# Patient Record
Sex: MALE | Race: WHITE | Employment: OTHER | URBAN - METROPOLITAN AREA
[De-identification: names, ages, dates, MRNs, and addresses within clinical notes are randomized per-mention and may not be internally consistent; named-entity substitution may affect disease eponyms.]

---

## 2017-04-14 ENCOUNTER — OFFICE VISIT (OUTPATIENT)
Dept: FAMILY MEDICINE | Facility: CLINIC | Age: 57
End: 2017-04-14
Payer: COMMERCIAL

## 2017-04-14 VITALS
DIASTOLIC BLOOD PRESSURE: 90 MMHG | SYSTOLIC BLOOD PRESSURE: 158 MMHG | OXYGEN SATURATION: 97 % | TEMPERATURE: 97.3 F | HEART RATE: 110 BPM

## 2017-04-14 DIAGNOSIS — F33.41 RECURRENT MAJOR DEPRESSIVE DISORDER, IN PARTIAL REMISSION (H): ICD-10-CM

## 2017-04-14 DIAGNOSIS — N52.39 OTHER POST-PROCEDURAL ERECTILE DYSFUNCTION: ICD-10-CM

## 2017-04-14 DIAGNOSIS — R03.0 ELEVATED BLOOD PRESSURE READING WITHOUT DIAGNOSIS OF HYPERTENSION: Primary | ICD-10-CM

## 2017-04-14 PROCEDURE — 99214 OFFICE O/P EST MOD 30 MIN: CPT | Performed by: FAMILY MEDICINE

## 2017-04-14 RX ORDER — DULOXETIN HYDROCHLORIDE 60 MG/1
60 CAPSULE, DELAYED RELEASE ORAL DAILY
Qty: 90 CAPSULE | Refills: 1 | Status: SHIPPED | OUTPATIENT
Start: 2017-04-14 | End: 2017-11-11

## 2017-04-14 RX ORDER — SILDENAFIL CITRATE 20 MG/1
TABLET ORAL
Qty: 90 TABLET | Refills: 1 | Status: SHIPPED | OUTPATIENT
Start: 2017-04-14 | End: 2017-07-26

## 2017-04-14 NOTE — PROGRESS NOTES
SUBJECTIVE:                                                    Mahesh Porter is a 56 year old male who presents to clinic today for the following health issues:      Depression Followup    Status since last visit: Worsened-no suicidal ideation    See PHQ-9 for current symptoms.  Other associated symptoms: None    Complicating factors:   Significant life event:  No   Current substance abuse:  None  Anxiety or Panic symptoms:  No    PHQ-9  English PHQ-9   Any Language      Pt feels that his depression symptoms are not as well controlled.  He has been on the Cymbalta 30 mg and Wellbutrin for some time, not missing doses      Amount of exercise or physical activity: 1-2 days/week for an average of 30-45 minutes    Problems taking medications regularly: No    Medication side effects: night sweats    Diet: regular (no restrictions)      Erectile dysfunction       Duration: since surgery in 2006     Description (location/character/radiation): decreased erection     Intensity:  moderate    Accompanying signs and symptoms: none    History (similar episodes/previous evaluation): since his surgery for testicular cancer 2006    Precipitating or alleviating factors: None    Therapies tried and outcome: Viagra/sildenafil helps     Pt has not had Hx of hypertension. He has had more caffeine than usual, and had fast food ([employer]) for lunch      Problem list and histories reviewed & adjusted, as indicated.  Additional history: as documented    Labs reviewed in EPIC    Reviewed and updated as needed this visit by clinical staff  Tobacco  Allergies  Meds  Med Hx  Surg Hx  Fam Hx  Soc Hx      Reviewed and updated as needed this visit by Provider         ROS:  CONSTITUTIONAL:NEGATIVE for fever, chills, change in weight  RESP:NEGATIVE for significant cough or SOB  CV: NEGATIVE for chest pain, palpitations or peripheral edema  : positive for and erectile dysfunction  PSYCHIATRIC: POSITIVE fordepressed mood and Hx  depression    OBJECTIVE:                                                    /90 (BP Location: Right arm, Cuff Size: Adult Large)  Pulse 110  Temp 97.3  F (36.3  C) (Tympanic)  SpO2 97%  There is no height or weight on file to calculate BMI.  GENERAL APPEARANCE: healthy, alert and no distress  RESP: lungs clear to auscultation - no rales, rhonchi or wheezes  CV: regular rates and rhythm, normal S1 S2, no S3 or S4 and no murmur, click or rub  PSYCH: mentation appears normal and affect flat    Diagnostic test results:  none      ASSESSMENT/PLAN:                                                        ICD-10-CM    1. Elevated blood pressure reading without diagnosis of hypertension R03.0    2. Recurrent major depressive disorder, in partial remission (H) F33.41 DULoxetine (CYMBALTA) 60 MG EC capsule   3. Other post-procedural erectile dysfunction N52.39 sildenafil (REVATIO/VIAGRA) 20 MG tablet       Follow up with Provider - 3 mo   Increase the Cymbalta to 60 mg daily   Patient Instructions   Minimize salt  Monitor BP 1-2 times weekly for the next 4-8 weeks.  If not improving return for recheck earlier than planned Goal for BP < 130/80      Tyrone Dunn MD  [facility]

## 2017-04-14 NOTE — PATIENT INSTRUCTIONS
Minimize salt  Monitor BP 1-2 times weekly for the next 4-8 weeks.  If not improving return for recheck earlier than planned Goal for BP < 130/80

## 2017-04-14 NOTE — NURSING NOTE
"Chief Complaint   Patient presents with     Depression     f/u     Groin Pain     x 1month, bilateral       Initial BP (!) 188/100 (BP Location: Right arm, Patient Position: Chair, Cuff Size: Adult Large)  Pulse 112  Temp 97.3  F (36.3  C) (Tympanic)  SpO2 97% Estimated body mass index is 27.5 kg/(m^2) as calculated from the following:    Height as of 10/26/16: 6' 0.75\" (1.848 m).    Weight as of 10/26/16: 207 lb (93.9 kg).  Medication Reconciliation: complete   Princess ANA MARÍA Holliday CMA      "

## 2017-04-14 NOTE — MR AVS SNAPSHOT
After Visit Summary   4/14/2017    Mahesh Porter    MRN: [id number]           Patient Information     Date Of Birth          1960        Visit Information        Provider Department      4/14/2017 1:30 PM Tyroen Dunn MD [facility]        Today's Diagnoses     Elevated blood pressure reading without diagnosis of hypertension    -  1    Recurrent major depressive disorder, in partial remission (H)        Other post-procedural erectile dysfunction          Care Instructions    Minimize salt  Monitor BP 1-2 times weekly for the next 4-8 weeks.  If not improving return for recheck earlier than planned Goal for BP < 130/80        Follow-ups after your visit        Who to contact     If you have questions or need follow up information about today's clinic visit or your schedule please contact [facility] directly at [phone].  Normal or non-critical lab and imaging results will be communicated to you by LawPivothart, letter or phone within 4 business days after the clinic has received the results. If you do not hear from us within 7 days, please contact the clinic through LawPiv[employer]art or phone. If you have a critical or abnormal lab result, we will notify you by phone as soon as possible.  Submit refill requests through [employer] or call your pharmacy and they will forward the refill request to us. Please allow 3 business days for your refill to be completed.          Additional Information About Your Visit        MyChart Information     [employer] gives you secure access to your electronic health record. If you see a primary care provider, you can also send messages to your care team and make appointments. If you have questions, please call your primary care clinic.  If you do not have a primary care provider, please call [phone] and they will assist you.        Care EveryWhere ID     This is your Care EveryWhere ID. This could be used by other  organizations to access your [facility] medical records  [id number]        Your Vitals Were     Pulse Temperature Pulse Oximetry             110 97.3  F (36.3  C) (Tympanic) 97%          Blood Pressure from Last 3 Encounters:   04/14/17 158/90   10/26/16 130/80    Weight from Last 3 Encounters:   10/26/16 207 lb (93.9 kg)              Today, you had the following     No orders found for display         Today's Medication Changes          These changes are accurate as of: 4/14/17  2:03 PM.  If you have any questions, ask your nurse or doctor.               These medicines have changed or have updated prescriptions.        Dose/Directions    DULoxetine 60 MG EC capsule   Commonly known as:  CYMBALTA   This may have changed:    - medication strength  - how much to take   Used for:  Recurrent major depressive disorder, in partial remission (H)   Changed by:  Tyrone Dunn MD        Dose:  60 mg   Take 1 capsule (60 mg) by mouth daily   Quantity:  90 capsule   Refills:  1            Where to get your medicines      These medications were sent to [facility] [address] [place] [address] [address] [facility] [address] [facility] [address]     Phone:  [phone]     DULoxetine 60 MG EC capsule         Some of these will need a paper prescription and others can be bought over the counter.  Ask your nurse if you have questions.     Bring a paper prescription for each of these medications     sildenafil 20 MG tablet                Primary Care Provider Office Phone # Fax #    Faraz Mak PA-C [phone]       [facility] [address] [facility] [address]  [facility] [address]        Thank you!     Thank you for choosing [facility]  for your care. Our goal is always to provide you with excellent care. Hearing back from our patients is one way we can continue to improve our services. Please take a few minutes to complete the written survey  that you may receive in the mail after your visit with us. Thank you!             Your Updated Medication List - Protect others around you: Learn how to safely use, store and throw away your medicines at www.disposemymeds.org.          This list is accurate as of: 4/14/17  2:03 PM.  Always use your most recent med list.                   Brand Name Dispense Instructions for use    atorvastatin 40 MG tablet    LIPITOR    90 tablet    Take 1 tablet (40 mg) by mouth daily       buPROPion 150 MG 24 hr tablet    WELLBUTRIN XL    90 tablet    Take 1 tablet (150 mg) by mouth every morning       CIALIS 20 MG tablet   Generic drug:  tadalafil      Take 20 mg by mouth Reported on 4/14/2017       DHA-EPA-VITAMIN E PO      Take 1 capsule by mouth       DULoxetine 60 MG EC capsule    CYMBALTA    90 capsule    Take 1 capsule (60 mg) by mouth daily       fenofibrate 54 MG tablet     90 tablet    Take 1 tablet (54 mg) by mouth daily       MULTI-VITAMINS Tabs      Take 1 tablet by mouth       PROBIOTIC & ACIDOPHILUS EX ST Caps      Take 1 capsule by mouth       sildenafil 20 MG tablet    REVATIO/VIAGRA    90 tablet    1-3 prior to intercourse as needed.       testosterone 20.25 MG/ACT gel    ANDROGEL 1.62% PUMP    3 Month    Place 2 pumps (40.5 mg) onto the skin daily Apply from dispenser to clean, dry, intact skin of the upper arms and shoulders.       traZODone 50 MG tablet    DESYREL     Reported on 4/14/2017

## 2017-04-15 ASSESSMENT — PATIENT HEALTH QUESTIONNAIRE - PHQ9: SUM OF ALL RESPONSES TO PHQ QUESTIONS 1-9: 5

## 2017-05-01 ENCOUNTER — OFFICE VISIT (OUTPATIENT)
Dept: FAMILY MEDICINE | Facility: CLINIC | Age: 57
End: 2017-05-01
Payer: COMMERCIAL

## 2017-05-01 VITALS
DIASTOLIC BLOOD PRESSURE: 96 MMHG | BODY MASS INDEX: 29.53 KG/M2 | HEIGHT: 72 IN | RESPIRATION RATE: 16 BRPM | WEIGHT: 218 LBS | SYSTOLIC BLOOD PRESSURE: 152 MMHG | HEART RATE: 92 BPM | OXYGEN SATURATION: 97 % | TEMPERATURE: 97.8 F

## 2017-05-01 DIAGNOSIS — E78.00 HYPERCHOLESTEROLEMIA: ICD-10-CM

## 2017-05-01 DIAGNOSIS — I10 ESSENTIAL HYPERTENSION, BENIGN: Primary | ICD-10-CM

## 2017-05-01 DIAGNOSIS — Z85.47 HISTORY OF TESTICULAR CANCER: ICD-10-CM

## 2017-05-01 DIAGNOSIS — F33.41 RECURRENT MAJOR DEPRESSIVE DISORDER, IN PARTIAL REMISSION (H): ICD-10-CM

## 2017-05-01 PROCEDURE — 80053 COMPREHEN METABOLIC PANEL: CPT | Performed by: FAMILY MEDICINE

## 2017-05-01 PROCEDURE — 36415 COLL VENOUS BLD VENIPUNCTURE: CPT | Performed by: FAMILY MEDICINE

## 2017-05-01 PROCEDURE — 99214 OFFICE O/P EST MOD 30 MIN: CPT | Performed by: FAMILY MEDICINE

## 2017-05-01 RX ORDER — LISINOPRIL 5 MG/1
5 TABLET ORAL DAILY
Qty: 90 TABLET | Refills: 3 | Status: SHIPPED | OUTPATIENT
Start: 2017-05-01 | End: 2017-07-26

## 2017-05-01 NOTE — MR AVS SNAPSHOT
After Visit Summary   5/1/2017    Mahesh Porter    MRN: [id number]           Patient Information     Date Of Birth          1960        Visit Information        Provider Department      5/1/2017 4:30 PM Tyrone Dunn MD [facility]        Today's Diagnoses     Essential hypertension, benign    -  1      Care Instructions    Monitor BP 1-2 times weekly for the next 4-8 weeks.  If not improving return for recheck earlier than planned  Goal of BP < 130/80         Follow-ups after your visit        Who to contact     If you have questions or need follow up information about today's clinic visit or your schedule please contact [facility] directly at [phone].  Normal or non-critical lab and imaging results will be communicated to you by [email or web address], letter or phone within 4 business days after the clinic has received the results. If you do not hear from us within 7 days, please contact the clinic through [email or web address] or phone. If you have a critical or abnormal lab result, we will notify you by phone as soon as possible.  Submit refill requests through [employer] or call your pharmacy and they will forward the refill request to us. Please allow 3 business days for your refill to be completed.          Additional Information About Your Visit        MyChart Information     [employer] gives you secure access to your electronic health record. If you see a primary care provider, you can also send messages to your care team and make appointments. If you have questions, please call your primary care clinic.  If you do not have a primary care provider, please call [phone] and they will assist you.        Care EveryWhere ID     This is your Care EveryWhere ID. This could be used by other organizations to access your [place] medical records  [id number]        Your Vitals Were     Pulse Temperature Respirations Height Pulse Oximetry BMI (Body Mass Index)  "   92 97.8  F (36.6  C) (Tympanic) 16 6' 0.25\" (1.835 m) 97% 29.36 kg/m2       Blood Pressure from Last 3 Encounters:   05/01/17 (!) 152/96   04/14/17 158/90   10/26/16 130/80    Weight from Last 3 Encounters:   05/01/17 218 lb (98.9 kg)   10/26/16 207 lb (93.9 kg)              We Performed the Following     Comprehensive metabolic panel (BMP + Alb, Alk Phos, ALT, AST, Total. Bili, TP)          Today's Medication Changes          These changes are accurate as of: 5/1/17  4:56 PM.  If you have any questions, ask your nurse or doctor.               Start taking these medicines.        Dose/Directions    lisinopril 5 MG tablet   Commonly known as:  PRINIVIL/ZESTRIL   Used for:  Essential hypertension, benign   Started by:  Tyrone Dunn MD        Dose:  5 mg   Take 1 tablet (5 mg) by mouth daily   Quantity:  90 tablet   Refills:  3            Where to get your medicines      These medications were sent to [facility] [address] [place], [place] [id number] [facility] [address] [facility] [address]     Phone:  [phone]     lisinopril 5 MG tablet                Primary Care Provider Office Phone # Fax #    Faraz Mak PA-C [phone]       [facility] [address] [facility] [address]  [facility] [address]        Thank you!     Thank you for choosing [facility]  for your care. Our goal is always to provide you with excellent care. Hearing back from our patients is one way we can continue to improve our services. Please take a few minutes to complete the written survey that you may receive in the mail after your visit with us. Thank you!             Your Updated Medication List - Protect others around you: Learn how to safely use, store and throw away your medicines at www.disposemymeds.org.          This list is accurate as of: 5/1/17  4:56 PM.  Always use your most recent med list.                   Brand Name Dispense " Instructions for use    atorvastatin 40 MG tablet    LIPITOR    90 tablet    Take 1 tablet (40 mg) by mouth daily       buPROPion 150 MG 24 hr tablet    WELLBUTRIN XL    90 tablet    Take 1 tablet (150 mg) by mouth every morning       CIALIS 20 MG tablet   Generic drug:  tadalafil      Take 20 mg by mouth Reported on 5/1/2017       DHA-EPA-VITAMIN E PO      Take 1 capsule by mouth       DULoxetine 60 MG EC capsule    CYMBALTA    90 capsule    Take 1 capsule (60 mg) by mouth daily       fenofibrate 54 MG tablet     90 tablet    Take 1 tablet (54 mg) by mouth daily       lisinopril 5 MG tablet    PRINIVIL/ZESTRIL    90 tablet    Take 1 tablet (5 mg) by mouth daily       MULTI-VITAMINS Tabs      Take 1 tablet by mouth       PROBIOTIC & ACIDOPHILUS EX ST Caps      Take 1 capsule by mouth       sildenafil 20 MG tablet    REVATIO/VIAGRA    90 tablet    1-3 prior to intercourse as needed.       testosterone 20.25 MG/ACT gel    ANDROGEL 1.62% PUMP    3 Month    Place 2 pumps (40.5 mg) onto the skin daily Apply from dispenser to clean, dry, intact skin of the upper arms and shoulders.       traZODone 50 MG tablet    OSEAS     Reported on 5/1/2017

## 2017-05-01 NOTE — PATIENT INSTRUCTIONS
Monitor BP 1-2 times weekly for the next 4-8 weeks.  If not improving return for recheck earlier than planned  Goal of BP < 130/80

## 2017-05-01 NOTE — PROGRESS NOTES
SUBJECTIVE:                                                    Mahesh Porter is a 56 year old male who presents to clinic today for the following health issues:      Hypertension Follow-up      Outpatient blood pressures are being checked at store.  Results are 130/90.    Low Salt Diet: not monitoring salt       Amount of exercise or physical activity: 2-3 days/week for an average of 15-30 minutes    Problems taking medications regularly: No    Medication side effects: none    Diet: regular (no restrictions)    Has increased Cymbalta intake and believes that might have something to do with increased BP.        Abdominal Pain      Duration: several months    Description (location/character/radiation): lower abdomen/groin area       Associated flank pain: None    Intensity:  Moderate but not constant    Accompanying signs and symptoms:        Fever/Chills: no        Gas/Bloating: no        Nausea/vomitting: no        Diarrhea: no        Dysuria or Hematuria: no     History (previous similar pain/trauma/previous testing): no    Precipitating or alleviating factors:       Pain worse with eating/BM/urination: Yes pain is more present with bowel movement       Pain relieved by BM: YES    Therapies tried and outcome: None    LMP:  not applicable   Hx of previous testicular cancer, Rt testicle removed.  No bulging but aware of discomfort in area    Problem list and histories reviewed & adjusted, as indicated.  Additional history: as documented    Labs reviewed in EPIC    Reviewed and updated as needed this visit by clinical staff  Tobacco  Allergies  Meds  Med Hx  Surg Hx  Fam Hx  Soc Hx      Reviewed and updated as needed this visit by Provider         ROS:  CONSTITUTIONAL:NEGATIVE for fever, chills, change in weight  RESP:NEGATIVE for significant cough or SOB  CV: NEGATIVE for chest pain, palpitations or peripheral edema  GI: POSITIVE for abdominal pain suprapubic  : positive for and erectile  "dysfunction    OBJECTIVE:                                                    BP (!) 152/96 (Cuff Size: Adult Large)  Pulse 92  Temp 97.8  F (36.6  C) (Tympanic)  Resp 16  Ht 6' 0.25\" (1.835 m)  Wt 218 lb (98.9 kg)  SpO2 97%  BMI 29.36 kg/m2  Body mass index is 29.36 kg/(m^2).  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 Essential hypertension, benign I10 lisinopril (PRINIVIL/ZESTRIL) 5 MG tablet     Comprehensive metabolic panel (BMP + Alb, Alk Phos, ALT, AST, Total. Bili, TP)   2. History of testicular cancer Z85.47    3. Recurrent major depressive disorder, in partial remission (H) F33.41    4. Hypercholesterolemia E78.00 Lipid panel reflex to direct LDL       Follow up with Provider - 3 mo    Patient Instructions   Monitor BP 1-2 times weekly for the next 4-8 weeks.  If not improving return for recheck earlier than planned  Goal of BP < 130/80       Tyrone Dunn MD  [facility]    "

## 2017-05-02 LAB
ALBUMIN SERPL-MCNC: 4.2 G/DL (ref 3.4–5)
ALP SERPL-CCNC: 87 U/L (ref 40–150)
ALT SERPL W P-5'-P-CCNC: 38 U/L (ref 0–70)
ANION GAP SERPL CALCULATED.3IONS-SCNC: 6 MMOL/L (ref 3–14)
AST SERPL W P-5'-P-CCNC: 27 U/L (ref 0–45)
BILIRUB SERPL-MCNC: 0.4 MG/DL (ref 0.2–1.3)
BUN SERPL-MCNC: 19 MG/DL (ref 7–30)
CALCIUM SERPL-MCNC: 9.5 MG/DL (ref 8.5–10.1)
CHLORIDE SERPL-SCNC: 105 MMOL/L (ref 94–109)
CO2 SERPL-SCNC: 29 MMOL/L (ref 20–32)
CREAT SERPL-MCNC: 1.16 MG/DL (ref 0.66–1.25)
GFR SERPL CREATININE-BSD FRML MDRD: 65 ML/MIN/1.7M2
GLUCOSE SERPL-MCNC: 106 MG/DL (ref 70–99)
POTASSIUM SERPL-SCNC: 4 MMOL/L (ref 3.4–5.3)
PROT SERPL-MCNC: 7.8 G/DL (ref 6.8–8.8)
SODIUM SERPL-SCNC: 140 MMOL/L (ref 133–144)

## 2017-06-12 DIAGNOSIS — E29.1 HYPOGONADISM MALE: ICD-10-CM

## 2017-06-12 NOTE — TELEPHONE ENCOUNTER
testosterone (ANDROGEL 1.62% PUMP) 20.25 MG/ACT gel  Last Written Prescription Date:  10/28/2016  Last Fill Quantity: 3,   # refills: 5  Last Office Visit with [facility] or  Health prescribing provider: 05/01/2017  Future Office visit:       Routing refill request to provider for review/approval because:  Drug not on the [facility], [facility] or  Health refill protocol or controlled substance

## 2017-06-14 RX ORDER — TESTOSTERONE 1.62 MG/G
2 GEL TRANSDERMAL DAILY
Qty: 75 G | Refills: 3 | Status: SHIPPED | OUTPATIENT
Start: 2017-06-14 | End: 2017-12-22

## 2017-06-15 ENCOUNTER — TELEPHONE (OUTPATIENT)
Dept: NURSING | Facility: CLINIC | Age: 57
End: 2017-06-15

## 2017-07-26 ENCOUNTER — OFFICE VISIT (OUTPATIENT)
Dept: FAMILY MEDICINE | Facility: CLINIC | Age: 57
End: 2017-07-26
Payer: COMMERCIAL

## 2017-07-26 VITALS
SYSTOLIC BLOOD PRESSURE: 112 MMHG | HEIGHT: 72 IN | TEMPERATURE: 98.1 F | WEIGHT: 218 LBS | HEART RATE: 99 BPM | RESPIRATION RATE: 16 BRPM | DIASTOLIC BLOOD PRESSURE: 72 MMHG | BODY MASS INDEX: 29.53 KG/M2 | OXYGEN SATURATION: 94 %

## 2017-07-26 DIAGNOSIS — E78.5 HYPERLIPIDEMIA LDL GOAL <100: ICD-10-CM

## 2017-07-26 DIAGNOSIS — N52.39 OTHER POST-PROCEDURAL ERECTILE DYSFUNCTION: ICD-10-CM

## 2017-07-26 DIAGNOSIS — Z23 NEED FOR VACCINATION: Primary | ICD-10-CM

## 2017-07-26 DIAGNOSIS — I10 ESSENTIAL HYPERTENSION, BENIGN: ICD-10-CM

## 2017-07-26 PROCEDURE — 90715 TDAP VACCINE 7 YRS/> IM: CPT | Performed by: FAMILY MEDICINE

## 2017-07-26 PROCEDURE — 99214 OFFICE O/P EST MOD 30 MIN: CPT | Mod: 25 | Performed by: FAMILY MEDICINE

## 2017-07-26 PROCEDURE — 90471 IMMUNIZATION ADMIN: CPT | Performed by: FAMILY MEDICINE

## 2017-07-26 RX ORDER — LISINOPRIL 5 MG/1
5 TABLET ORAL DAILY
Qty: 90 TABLET | Refills: 3 | Status: SHIPPED | OUTPATIENT
Start: 2017-07-26 | End: 2018-06-29

## 2017-07-26 RX ORDER — SILDENAFIL CITRATE 20 MG/1
TABLET ORAL
Qty: 90 TABLET | Refills: 6 | Status: SHIPPED | OUTPATIENT
Start: 2017-07-26 | End: 2017-11-11

## 2017-07-26 RX ORDER — FENOFIBRATE 54 MG/1
54 TABLET ORAL DAILY
Qty: 90 TABLET | Refills: 3 | Status: SHIPPED | OUTPATIENT
Start: 2017-07-26 | End: 2017-11-11

## 2017-07-26 NOTE — LETTER
My Depression Action Plan  Name: Mahesh Porter   Date of Birth 1960  Date: 7/26/2017    My doctor: Kuldip Lewis   My clinic: [address] [address]  [phone]          GREEN    ZONE   Good Control    What it looks like:     Things are going generally well. You have normal up s and down s. You may even feel depressed from time to time, but bad moods usually last less than a day.   What you need to do:  1. Continue to care for yourself (see self care plan)  2. Check your depression survival kit and update it as needed  3. Follow your physician s recommendations including any medication.  4. Do not stop taking medication unless you consult with your physician first.           YELLOW         ZONE Getting Worse    What it looks like:     Depression is starting to interfere with your life.     It may be hard to get out of bed; you may be starting to isolate yourself from others.    Symptoms of depression are starting to last most all day and this has happened for several days.     You may have suicidal thoughts but they are not constant.   What you need to do:     1. Call your care team, your response to treatment will improve if you keep your care team informed of your progress. Yellow periods are signs an adjustment may need to be made.     2. Continue your self-care, even if you have to fake it!    3. Talk to someone in your support network    4. Open up your depression survival kit           RED    ZONE Medical Alert - Get Help    What it looks like:     Depression is seriously interfering with your life.     You may experience these or other symptoms: You can t get out of bed most days, can t work or engage in other necessary activities, you have trouble taking care of basic hygiene, or basic responsibilities, thoughts of suicide or death that will not go away, self-injurious behavior.     What you need to  do:  1. Call your care team and request a same-day appointment. If they are not available (weekends or after hours) call your local crisis line, emergency room or 911.      Electronically signed by: Eleni Graves, July 26, 2017    Depression Self Care Plan / Survival Kit    Self-Care for Depression  Here s the deal. Your body and mind are really not as separate as most people think.  What you do and think affects how you feel and how you feel influences what you do and think. This means if you do things that people who feel good do, it will help you feel better.  Sometimes this is all it takes.  There is also a place for medication and therapy depending on how severe your depression is, so be sure to consult with your medical provider and/ or Behavioral Health Consultant if your symptoms are worsening or not improving.     In order to better manage my stress, I will:    Exercise  Get some form of exercise, every day. This will help reduce pain and release endorphins, the  feel good  chemicals in your brain. This is almost as good as taking antidepressants!  This is not the same as joining a gym and then never going! (they count on that by the way ) It can be as simple as just going for a walk or doing some gardening, anything that will get you moving.      Hygiene   Maintain good hygiene (Get out of bed in the morning, Make your bed, Brush your teeth, Take a shower, and Get dressed like you were going to work, even if you are unemployed).  If your clothes don't fit try to get ones that do.    Diet  I will strive to eat foods that are good for me, drink plenty of water, and avoid excessive sugar, caffeine, alcohol, and other mood-altering substances.  Some foods that are helpful in depression are: complex carbohydrates, B vitamins, flaxseed, fish or fish oil, fresh fruits and vegetables.    Psychotherapy  I agree to participate in Individual Therapy (if recommended).    Medication  If prescribed medications, I agree  to take them.  Missing doses can result in serious side effects.  I understand that drinking alcohol, or other illicit drug use, may cause potential side effects.  I will not stop my medication abruptly without first discussing it with my provider.    Staying Connected With Others  I will stay in touch with my friends, family members, and my primary care provider/team.    Use your imagination  Be creative.  We all have a creative side; it doesn t matter if it s oil painting, sand castles, or mud pies! This will also kick up the endorphins.    Witness Beauty  (AKA stop and smell the roses) Take a look outside, even in mid-winter. Notice colors, textures. Watch the squirrels and birds.     Service to others  Be of service to others.  There is always someone else in need.  By helping others we can  get out of ourselves  and remember the really important things.  This also provides opportunities for practicing all the other parts of the program.    Humor  Laugh and be silly!  Adjust your TV habits for less news and crime-drama and more comedy.    Control your stress  Try breathing deep, massage therapy, biofeedback, and meditation. Find time to relax each day.     My support system    Clinic Contact:  Phone number:    Contact 1:  Phone number:    Contact 2:  Phone number:    Christianity/:  Phone number:    Therapist:  Phone number:    Local crisis center:    Phone number:    Other community support:  Phone number:

## 2017-07-26 NOTE — NURSING NOTE
"Chief Complaint   Patient presents with     Depression     Follow up     Hypertension     Recheck       Initial /72 (BP Location: Right arm, Patient Position: Sitting, Cuff Size: Adult Regular)  Pulse 99  Temp 98.1  F (36.7  C) (Tympanic)  Resp 16  Ht 6' 0.25\" (1.835 m)  Wt 218 lb (98.9 kg)  SpO2 94%  BMI 29.36 kg/m2 Estimated body mass index is 29.36 kg/(m^2) as calculated from the following:    Height as of this encounter: 6' 0.25\" (1.835 m).    Weight as of this encounter: 218 lb (98.9 kg).  Medication Reconciliation: complete     Eleni Latham LPN  "

## 2017-07-26 NOTE — MR AVS SNAPSHOT
After Visit Summary   7/26/2017    Mahesh Porter    MRN: [id number]           Patient Information     Date Of Birth          1960        Visit Information        Provider Department      7/26/2017 2:45 PM Kuldip Lewis MD [facility]        Today's Diagnoses     Need for vaccination    -  1    Other post-procedural erectile dysfunction        Hyperlipidemia LDL goal <100        Essential hypertension, benign           Follow-ups after your visit        Who to contact     If you have questions or need follow up information about today's clinic visit or your schedule please contact [facility] directly at [phone].  Normal or non-critical lab and imaging results will be communicated to you by MyChart, letter or phone within 4 business days after the clinic has received the results. If you do not hear from us within 7 days, please contact the clinic through MyChart or phone. If you have a critical or abnormal lab result, we will notify you by phone as soon as possible.  Submit refill requests through [employer] or call your pharmacy and they will forward the refill request to us. Please allow 3 business days for your refill to be completed.          Additional Information About Your Visit        MyChart Information     [employer] gives you secure access to your electronic health record. If you see a primary care provider, you can also send messages to your care team and make appointments. If you have questions, please call your primary care clinic.  If you do not have a primary care provider, please call [phone] and they will assist you.        Care EveryWhere ID     This is your Care EveryWhere ID. This could be used by other organizations to access your [place] medical records  [id number]        Your Vitals Were     Pulse Temperature Respirations Height Pulse Oximetry BMI (Body Mass Index)    99 98.1  F (36.7  C) (Tympanic)  "16 6' 0.25\" (1.835 m) 94% 29.36 kg/m2       Blood Pressure from Last 3 Encounters:   07/26/17 112/72   05/01/17 (!) 152/96   04/14/17 158/90    Weight from Last 3 Encounters:   07/26/17 218 lb (98.9 kg)   05/01/17 218 lb (98.9 kg)   10/26/16 207 lb (93.9 kg)              We Performed the Following     1st  Administration  [02501]     TDAP VACCINE (ADACEL) [58595.002]          Today's Medication Changes          These changes are accurate as of: 7/26/17  3:51 PM.  If you have any questions, ask your nurse or doctor.               These medicines have changed or have updated prescriptions.        Dose/Directions    sildenafil 20 MG tablet   Commonly known as:  REVATIO/VIAGRA   This may have changed:  additional instructions   Used for:  Other post-procedural erectile dysfunction   Changed by:  Kuldip Lewis MD        3 prior to intercourse as needed.   Quantity:  90 tablet   Refills:  6            Where to get your medicines      These medications were sent to [facility] [address] [place] [address] [address] [facility] [address] [facility] [address]     Phone:  [phone]     fenofibrate 54 MG tablet    lisinopril 5 MG tablet         Some of these will need a paper prescription and others can be bought over the counter.  Ask your nurse if you have questions.     Bring a paper prescription for each of these medications     sildenafil 20 MG tablet                Primary Care Provider Office Phone # Fax #    Kuldip Lewis [phone]       [facility] [address]  [facility] [address]        Equal Access to Services     BARON LINDER AH: Hadii nahum Gavin, waaxda luqadaha, qaybta kaalmada adeegyada, gurmeet allen. So [facility] [phone].    ATENCIÓN: Si habla español, tiene a more disposición servicios gratuitos de asistencia lingüística. Llame al [phone].    We comply with " applicable federal civil rights laws and [place] laws. We do not discriminate on the basis of race, color, national origin, age, disability sex, sexual orientation or gender identity.            Thank you!     Thank you for choosing [facility]  for your care. Our goal is always to provide you with excellent care. Hearing back from our patients is one way we can continue to improve our services. Please take a few minutes to complete the written survey that you may receive in the mail after your visit with us. Thank you!             Your Updated Medication List - Protect others around you: Learn how to safely use, store and throw away your medicines at www.disposemymeds.org.          This list is accurate as of: 7/26/17  3:51 PM.  Always use your most recent med list.                   Brand Name Dispense Instructions for use Diagnosis    atorvastatin 40 MG tablet    LIPITOR    90 tablet    Take 1 tablet (40 mg) by mouth daily    Hyperlipidemia LDL goal <100       buPROPion 150 MG 24 hr tablet    WELLBUTRIN XL    90 tablet    Take 1 tablet (150 mg) by mouth every morning    Depression, unspecified depression type       DHA-EPA-VITAMIN E PO      Take 1 capsule by mouth        DULoxetine 60 MG EC capsule    CYMBALTA    90 capsule    Take 1 capsule (60 mg) by mouth daily    Recurrent major depressive disorder, in partial remission (H)       fenofibrate 54 MG tablet     90 tablet    Take 1 tablet (54 mg) by mouth daily    Hyperlipidemia LDL goal <100       lisinopril 5 MG tablet    PRINIVIL/ZESTRIL    90 tablet    Take 1 tablet (5 mg) by mouth daily    Essential hypertension, benign       MULTI-VITAMINS Tabs      Take 1 tablet by mouth        PROBIOTIC & ACIDOPHILUS EX ST Caps      Take 1 capsule by mouth        sildenafil 20 MG tablet    REVATIO/VIAGRA    90 tablet    3 prior to intercourse as needed.    Other post-procedural erectile dysfunction       testosterone 20.25 MG/ACT gel     ANDROGEL 1.62% PUMP    75 g    Place 2 pumps (40.5 mg) onto the skin daily Apply from dispenser to clean, dry, intact skin of the upper arms and shoulders.    Hypogonadism male

## 2017-07-26 NOTE — PROGRESS NOTES
SUBJECTIVE:                                                    Mahesh Porter is a 57 year old male who presents to clinic today for the following health issues:        Hypertension Follow-up      Outpatient blood pressures are being checked at pharmacy.  Results are 130/90.    Low Salt Diet: no added salt    Depression Followup    Status since last visit: Improved     See PHQ-9 for current symptoms.  Other associated symptoms: None    Complicating factors:   Significant life event:  No   Current substance abuse:  None  Anxiety or Panic symptoms:  No    PHQ-9  English  PHQ-9   Any Language        Amount of exercise or physical activity: 2-3 days/week for an average of 15-30 minutes    Problems taking medications regularly: No    Medication side effects: night sweats  Diet: low salt  Here for follow up has been due for reassessment. Needs refill of bp medications also due for tetanus immuniization.l     PROBLEMS TO ADD ON...    Problem list and histories reviewed & adjusted, as indicated.  Additional history: as documented    medcations give him no side effects.   Patient Active Problem List   Diagnosis     Fibrous papule of nose     Recurrent major depressive disorder, in partial remission (H)     History of testicular cancer     Hyperlipidemia LDL goal <100     Post-procedural erectile dysfunction     Recurrent major depressive disorder (H)     Essential hypertension, benign     Hypercholesterolemia     Past Surgical History:   Procedure Laterality Date     GENITOURINARY SURGERY Right 2006    Rt testicle removed for testicular cancer       Social History   Substance Use Topics     Smoking status: Never Smoker     Smokeless tobacco: Never Used     Alcohol use Yes      Comment: a drink a day     Family History   Problem Relation Age of Onset     Lupus Mother      Childhood Heart Disease Father      Skin Cancer No family hx of      Melanoma No family hx of              Reviewed and updated as needed this visit by  "clinical staffTobacco  Allergies  Meds  Med Hx  Surg Hx  Fam Hx  Soc Hx      Reviewed and updated as needed this visit by Provider         ROS:  CONSTITUTIONAL:NEGATIVE for fever, chills, change in weight  INTEGUMENTARY/SKIN: NEGATIVE for worrisome rashes, moles or lesions  EYES: NEGATIVE for vision changes or irritation  ENT/MOUTH: NEGATIVE for ear, mouth and throat problems  RESP:NEGATIVE for significant cough or SOB  CV: NEGATIVE for chest pain, palpitations or peripheral edema  ENDOCRINE: NEGATIVE for temperature intolerance, skin/hair changes  HEME/ALLERGY/IMMUNE: NEGATIVE for bleeding problems  PSYCHIATRIC: moods are imporved.   g-u has difficlyt wit erectile dysfunction.     OBJECTIVE:                                                    /72 (BP Location: Right arm, Patient Position: Sitting, Cuff Size: Adult Regular)  Pulse 99  Temp 98.1  F (36.7  C) (Tympanic)  Resp 16  Ht 6' 0.25\" (1.835 m)  Wt 218 lb (98.9 kg)  SpO2 94%  BMI 29.36 kg/m2  Body mass index is 29.36 kg/(m^2).  GENERAL APPEARANCE: healthy, alert and no distress  EYES: Eyes grossly normal to inspection, PERRL and conjunctivae and sclerae normal  RESP: lungs clear to auscultation - no rales, rhonchi or wheezes  CV: regular rates and rhythm, normal S1 S2, no S3 or S4 and no murmur, click or rub  m-s-    Limbs appear grossly normal.   SKIN: no suspicious lesions or rashes  NEURO: Normal strength and tone, mentation intact and speech normal    Diagnostic test results:  Diagnostic Test Results:  none        ASSESSMENT/PLAN:                                                    1. Other post-procedural erectile dysfunction    - sildenafil (REVATIO/VIAGRA) 20 MG tablet; 3 prior to intercourse as needed.  Dispense: 90 tablet; Refill: 6    2. Hyperlipidemia LDL goal <100    - fenofibrate 54 MG tablet; Take 1 tablet (54 mg) by mouth daily  Dispense: 90 tablet; Refill: 3    3. Essential hypertension, benign    - lisinopril (PRINIVIL/ZESTRIL) " 5 MG tablet; Take 1 tablet (5 mg) by mouth daily  Dispense: 90 tablet; Refill: 3    4. Need for vaccination    - TDAP VACCINE (ADACEL) [99705.002]  - 1st  Administration  [26376]      Labs and medications as ordered.   Follow up with Provider - 2-6 weeks or as needed.      Kuldip Lewis MD  [facility]

## 2017-07-27 ASSESSMENT — PATIENT HEALTH QUESTIONNAIRE - PHQ9: SUM OF ALL RESPONSES TO PHQ QUESTIONS 1-9: 5

## 2017-11-08 ENCOUNTER — TELEPHONE (OUTPATIENT)
Dept: FAMILY MEDICINE | Facility: CLINIC | Age: 57
End: 2017-11-08

## 2017-11-08 NOTE — TELEPHONE ENCOUNTER
Prior Authorization Request    1. Prior Authorization for the medication Androgel 20.25 mg/act (1.62% gel)        Requesting Provider: Kuldip Lewis          Pt name: Mahesh Porter        Pt : 1960        Pt MRN: [id number]        Last Office Visit: 2017           Insurance: Payor: P[employer]ONE / Plan: PEAK ADMIN SERV OPEN ACCESS / Product Type: PPO /         Insurance ID Number: [unfilled]        Prior Auth Contact Phone number:     BIN#:   PCN#:        To be completed by provider:

## 2017-11-08 NOTE — TELEPHONE ENCOUNTER
Needs to establish care with new provider - can you let patient know he should schedule a visit?  Dr. Светлана Rebollar MD/[facility]

## 2017-11-09 ENCOUNTER — APPOINTMENT (OUTPATIENT)
Dept: LAB | Facility: CLINIC | Age: 57
End: 2017-11-09
Attending: FAMILY MEDICINE
Payer: COMMERCIAL

## 2017-11-09 DIAGNOSIS — E78.00 HYPERCHOLESTEROLEMIA: ICD-10-CM

## 2017-11-09 LAB
CHOLEST SERPL-MCNC: 238 MG/DL
HDLC SERPL-MCNC: 56 MG/DL
LDLC SERPL CALC-MCNC: 139 MG/DL
NONHDLC SERPL-MCNC: 182 MG/DL
TRIGL SERPL-MCNC: 217 MG/DL

## 2017-11-09 PROCEDURE — 80061 LIPID PANEL: CPT | Performed by: FAMILY MEDICINE

## 2017-11-09 PROCEDURE — 36415 COLL VENOUS BLD VENIPUNCTURE: CPT | Performed by: FAMILY MEDICINE

## 2017-11-09 PROCEDURE — 84403 ASSAY OF TOTAL TESTOSTERONE: CPT | Performed by: FAMILY MEDICINE

## 2017-11-11 ENCOUNTER — OFFICE VISIT (OUTPATIENT)
Dept: FAMILY MEDICINE | Facility: CLINIC | Age: 57
End: 2017-11-11
Payer: COMMERCIAL

## 2017-11-11 VITALS
OXYGEN SATURATION: 97 % | BODY MASS INDEX: 28.96 KG/M2 | RESPIRATION RATE: 16 BRPM | WEIGHT: 215 LBS | SYSTOLIC BLOOD PRESSURE: 128 MMHG | TEMPERATURE: 98 F | DIASTOLIC BLOOD PRESSURE: 84 MMHG | HEART RATE: 91 BPM

## 2017-11-11 DIAGNOSIS — E78.5 HYPERLIPIDEMIA LDL GOAL <100: ICD-10-CM

## 2017-11-11 DIAGNOSIS — F32.A DEPRESSION, UNSPECIFIED DEPRESSION TYPE: ICD-10-CM

## 2017-11-11 DIAGNOSIS — N52.39 OTHER POST-PROCEDURAL ERECTILE DYSFUNCTION: ICD-10-CM

## 2017-11-11 DIAGNOSIS — E29.1 HYPOGONADISM MALE: Primary | ICD-10-CM

## 2017-11-11 DIAGNOSIS — F33.41 RECURRENT MAJOR DEPRESSIVE DISORDER, IN PARTIAL REMISSION (H): ICD-10-CM

## 2017-11-11 PROCEDURE — 99214 OFFICE O/P EST MOD 30 MIN: CPT | Performed by: FAMILY MEDICINE

## 2017-11-11 RX ORDER — BUPROPION HYDROCHLORIDE 150 MG/1
150 TABLET ORAL EVERY MORNING
Qty: 90 TABLET | Refills: 3 | Status: SHIPPED | OUTPATIENT
Start: 2017-11-11 | End: 2018-11-22

## 2017-11-11 RX ORDER — SILDENAFIL CITRATE 20 MG/1
TABLET ORAL
Qty: 90 TABLET | Refills: 6 | Status: SHIPPED | OUTPATIENT
Start: 2017-11-11 | End: 2018-12-03

## 2017-11-11 RX ORDER — DULOXETIN HYDROCHLORIDE 60 MG/1
60 CAPSULE, DELAYED RELEASE ORAL DAILY
Qty: 90 CAPSULE | Refills: 1 | Status: SHIPPED | OUTPATIENT
Start: 2017-11-11 | End: 2018-06-29

## 2017-11-11 RX ORDER — FENOFIBRATE 54 MG/1
54 TABLET ORAL DAILY
Qty: 90 TABLET | Refills: 3 | Status: SHIPPED | OUTPATIENT
Start: 2017-11-11 | End: 2018-11-11

## 2017-11-11 NOTE — PROGRESS NOTES
SUBJECTIVE:   Mahesh Porter is a 57 year old male who presents to clinic today for the following health issues:    Forms       Duration: due as soon as possible    Description (location/character/radiation): Requesting forms be completed for refill on Testosterone gel.    Intensity:  mild    Accompanying signs and symptoms:     History (similar episodes/previous evaluation): YES    Precipitating or alleviating factors: None    Therapies tried and outcome: Androgel   Pt is post testicular cancer, had Rt testicle removed in 2006    Pt had low testosterone, has had good results with Androgel replacement.  Previous testosterone tests were done at [facility].  See Care Everywhere for those test results, last test there was 4/13/16 (173, low)     Insurance is requiring PA yearly for the Androgel    Hyperlipidemia Follow-Up      Rate your low fat/cholesterol diet?: good    Taking statin?  Yes, no muscle aches from statin    Other lipid medications/supplements?:  Fenofibrate    Hypertension Follow-up      Outpatient blood pressures are being checked at a clinic.  Results vary.    Low Salt Diet: low salt but does not monitor.    Depression Followup    Status since last visit: Stable     See PHQ-9 for current symptoms.  Other associated symptoms: None    Complicating factors:   Significant life event:  No   Current substance abuse:  None  Anxiety or Panic symptoms:  No    PHQ-9 Score and MyChart F/U Questions 4/14/2017 7/26/2017   Total Score 5 5   Q9: Suicide Ideation Not at all Not at all       PHQ-9  English  PHQ-9   Any Language  Suicide Assessment Five-step Evaluation and Treatment (SAFE-T)    Pt is taking Dluoxetine 60 mg daily  Also taking Bupropion  mg daily       Amount of exercise or physical activity: 1 day/week for an average of 45-60 minutes    Problems taking medications regularly: No    Medication side effects: none  Diet: low salt, low fat/cholesterol and carbohydrate counting        Tremor      Duration:  intermittent    Description (location/character/radiation): slight tremor in arms and hands    Intensity:  mild    Accompanying signs and symptoms: slight tremor of hands, worse in morning    History (similar episodes/previous evaluation): None    Precipitating or alleviating factors: Pt is on Duloxetine and Bupropion     Therapies tried and outcome: None   Pt has 2-3 cups of coffee in the morning  He has some concerns.  His father had Parkinsons disease in his 80's      Problem list and histories reviewed & adjusted, as indicated.  Additional history: as documented    Labs reviewed in EPIC    Reviewed and updated as needed this visit by clinical staffTobacco  Allergies  Meds  Med Hx  Surg Hx  Fam Hx  Soc Hx      Reviewed and updated as needed this visit by Provider         ROS:  INTEGUMENTARY/SKIN: NEGATIVE for worrisome rashes, moles or lesions  RESP:NEGATIVE for significant cough or SOB  CV: NEGATIVE for chest pain, palpitations or peripheral edema  : positive for and erectile dysfunction  NEURO: POSITIVE for tremor arms  PSYCHIATRIC: POSITIVE fordepressed mood and Hx depression    OBJECTIVE:                                                    /84 (BP Location: Left arm, Patient Position: Sitting, Cuff Size: Adult Large)  Pulse 91  Temp 98  F (36.7  C) (Tympanic)  Resp 16  Wt 215 lb (97.5 kg)  SpO2 97%  BMI 28.96 kg/m2  Body mass index is 28.96 kg/(m^2).  GENERAL APPEARANCE: healthy, alert and no distress  RESP: lungs clear to auscultation - no rales, rhonchi or wheezes  CV: regular rates and rhythm, normal S1 S2, no S3 or S4 and no murmur, click or rub  NEURO: Normal strength and tone, mentation intact, speech normal and tremor very slight tremor in arms, no rigidity or cogwheeling  PSYCH: mentation appears normal and affect flat    Diagnostic test results:  None: drawn earlier, results pending        ASSESSMENT/PLAN:                                                        ICD-10-CM    1.  Recurrent major depressive disorder, in partial remission (H) F33.41 DULoxetine (CYMBALTA) 60 MG EC capsule   2. Hyperlipidemia LDL goal <100 E78.5 fenofibrate 54 MG tablet   3. Depression, unspecified depression type F32.9 buPROPion (WELLBUTRIN XL) 150 MG 24 hr tablet   4. Other post-procedural erectile dysfunction N52.39 sildenafil (REVATIO) 20 MG tablet       Follow up with Provider - 6 mo   Will complete the PA form for his Androgel, and have that faxed in early next week  Patient Instructions   Decrease caffeine intake      Tyrone Dunn MD  [facility]

## 2017-11-11 NOTE — NURSING NOTE
"Chief Complaint   Patient presents with     Recheck Medication     Forms       Initial /84 (BP Location: Left arm, Patient Position: Sitting, Cuff Size: Adult Large)  Pulse 91  Temp 98  F (36.7  C) (Tympanic)  Resp 16  Wt 215 lb (97.5 kg)  SpO2 97%  BMI 28.96 kg/m2 Estimated body mass index is 28.96 kg/(m^2) as calculated from the following:    Height as of 7/26/17: 6' 0.25\" (1.835 m).    Weight as of this encounter: 215 lb (97.5 kg).  Medication Reconciliation: complete     Princess ANA MARÍA Holliday CMA      "

## 2017-11-11 NOTE — MR AVS SNAPSHOT
After Visit Summary   11/11/2017    Mahesh Porter    MRN: [id number]           Patient Information     Date Of Birth          1960        Visit Information        Provider Department      11/11/2017 10:45 AM Tyrone Dunn MD [facility]        Today's Diagnoses     Hypogonadism male    -  1    Recurrent major depressive disorder, in partial remission (H)        Hyperlipidemia LDL goal <100        Depression, unspecified depression type        Other post-procedural erectile dysfunction          Care Instructions    Decrease caffeine intake          Follow-ups after your visit        Follow-up notes from your care team     Return in about 6 months (around 5/11/2018).      Who to contact     If you have questions or need follow up information about today's clinic visit or your schedule please contact [facility] directly at [phone].  Normal or non-critical lab and imaging results will be communicated to you by arGEN-Xhart, letter or phone within 4 business days after the clinic has received the results. If you do not hear from us within 7 days, please contact the clinic through arGEN-Xhart or phone. If you have a critical or abnormal lab result, we will notify you by phone as soon as possible.  Submit refill requests through [employer] or call your pharmacy and they will forward the refill request to us. Please allow 3 business days for your refill to be completed.          Additional Information About Your Visit        MyChart Information     [employer] gives you secure access to your electronic health record. If you see a primary care provider, you can also send messages to your care team and make appointments. If you have questions, please call your primary care clinic.  If you do not have a primary care provider, please call [phone] and they will assist you.        Care EveryWhere ID     This is your Care EveryWhere ID. This could be used  by other organizations to access your [place] medical records  [id number]        Your Vitals Were     Pulse Temperature Respirations Pulse Oximetry BMI (Body Mass Index)       91 98  F (36.7  C) (Tympanic) 16 97% 28.96 kg/m2        Blood Pressure from Last 3 Encounters:   11/11/17 128/84   07/26/17 112/72   05/01/17 (!) 152/96    Weight from Last 3 Encounters:   11/11/17 215 lb (97.5 kg)   07/26/17 218 lb (98.9 kg)   05/01/17 218 lb (98.9 kg)              Today, you had the following     No orders found for display         Where to get your medicines      These medications were sent to [facility] [address] [facility] [address] [facility] [address] [facility] [address]     Phone:  [phone]     buPROPion 150 MG 24 hr tablet    DULoxetine 60 MG EC capsule    fenofibrate 54 MG tablet         Some of these will need a paper prescription and others can be bought over the counter.  Ask your nurse if you have questions.     Bring a paper prescription for each of these medications     sildenafil 20 MG tablet          Primary Care Provider Office Phone # Fax #    Kuldip Lewis [phone] [address] [facility] [address]        Equal Access to Services     ZEINAB LINDER AH: Hadii aad ku hadasho Soomaali, waaxda luqadaha, qaybta kaalmada adeegyada, gurmeet allen. So [facility] [phone].    ATENCIÓN: Si habla español, tiene a more disposición servicios gratuitos de asistencia lingüística. Adrienne tamayo [phone].    We comply with applicable federal civil rights laws and [place] laws. We do not discriminate on the basis of race, color, national origin, age, disability, sex, sexual orientation, or gender identity.            Thank you!     Thank you for choosing [facility]  for your care. Our goal is always to provide you with excellent care. Hearing back from our patients is one  way we can continue to improve our services. Please take a few minutes to complete the written survey that you may receive in the mail after your visit with us. Thank you!             Your Updated Medication List - Protect others around you: Learn how to safely use, store and throw away your medicines at www.disposemymeds.org.          This list is accurate as of: 11/11/17 12:31 PM.  Always use your most recent med list.                   Brand Name Dispense Instructions for use Diagnosis    atorvastatin 40 MG tablet    LIPITOR    90 tablet    Take 1 tablet (40 mg) by mouth daily    Hyperlipidemia LDL goal <100       buPROPion 150 MG 24 hr tablet    WELLBUTRIN XL    90 tablet    Take 1 tablet (150 mg) by mouth every morning    Depression, unspecified depression type       DHA-EPA-VITAMIN E PO      Take 1 capsule by mouth        DULoxetine 60 MG EC capsule    CYMBALTA    90 capsule    Take 1 capsule (60 mg) by mouth daily    Recurrent major depressive disorder, in partial remission (H)       fenofibrate 54 MG tablet     90 tablet    Take 1 tablet (54 mg) by mouth daily    Hyperlipidemia LDL goal <100       lisinopril 5 MG tablet    PRINIVIL/ZESTRIL    90 tablet    Take 1 tablet (5 mg) by mouth daily    Essential hypertension, benign       MULTI-VITAMINS Tabs      Take 1 tablet by mouth        PROBIOTIC & ACIDOPHILUS EX ST Caps      Take 1 capsule by mouth        sildenafil 20 MG tablet    REVATIO    90 tablet    3 prior to intercourse as needed.    Other post-procedural erectile dysfunction       testosterone 20.25 MG/ACT gel    ANDROGEL 1.62% PUMP    75 g    Place 2 pumps (40.5 mg) onto the skin daily Apply from dispenser to clean, dry, intact skin of the upper arms and shoulders.    Hypogonadism male

## 2017-11-13 ENCOUNTER — TELEPHONE (OUTPATIENT)
Dept: FAMILY MEDICINE | Facility: CLINIC | Age: 57
End: 2017-11-13

## 2017-11-13 LAB — TESTOST SERPL-MCNC: 342 NG/DL (ref 240–950)

## 2017-12-22 DIAGNOSIS — E29.1 HYPOGONADISM MALE: ICD-10-CM

## 2017-12-22 NOTE — TELEPHONE ENCOUNTER
testosterone (ANDROGEL 1.62% PUMP) 20.25 MG/ACT gel      Last Written Prescription Date:  6/14/17  Last Fill Quantity: 75 g ,   # refills: 3  Last Office Visit: 11/11/17  Future Office visit:       Routing refill request to provider for review/approval because:  Drug not on the [facility], [facility] or [facility] refill protocol or controlled substance

## 2017-12-27 RX ORDER — TESTOSTERONE 1.62 MG/G
2 GEL TRANSDERMAL DAILY
Qty: 75 G | Refills: 3 | Status: SHIPPED | OUTPATIENT
Start: 2017-12-27 | End: 2018-06-29

## 2018-02-28 ENCOUNTER — TELEPHONE (OUTPATIENT)
Dept: FAMILY MEDICINE | Facility: CLINIC | Age: 58
End: 2018-02-28

## 2018-02-28 NOTE — TELEPHONE ENCOUNTER
Panel Management Review      Patient has the following on his problem list:     Depression / Dysthymia review    Measure:  Needs PHQ-9 score of 4 or less during index window.  Administer PHQ-9 and if score is 5 or more, send encounter to provider for next steps.    5 - 7 month window range: no    PHQ-9 SCORE 4/14/2017 7/26/2017   Total Score 5 5       If PHQ-9 recheck is 5 or more, route to provider for next steps.    Patient is due for:  PHQ9 and DAP      Composite cancer screening  Chart review shows that this patient is due/due soon for the following Colonoscopy  Summary:    Patient is due/failing the following:   COLONOSCOPY, DAP, PHQ9 and PHYSICAL    Action needed:   Patient needs office visit for physical, referral for colonoscopy. and Patient needs to do PHQ9.    Type of outreach:    Sent letter.    Questions for provider review:    None                                                                                                                                    [facility]

## 2018-02-28 NOTE — LETTER
February 28, 2018    Mahesh Cabralgrecia  [address]   [facility] [address]    Dear Heaven Wilkes cares about your health and your health plan.  I have reviewed your medical conditions, medication list and lab results, and am making recommendations based on this review to better manage your health.    You are in particular need of attention regarding:  -Depression/Anxiety  -Colon Cancer Screening  -Wellness (Physical) Visit     I am recommending that you:     -schedule a WELLNESS (Physical) APPOINTMENT with me.   I will check fasting labs the same day - nothing to eat except water and meds for 8-10 hours prior. (If you go elsewhere for Wellness visits then please disregard this reminder.)    -schedule a COLONOSCOPY to look for colon cancer (due every 10 years or 5 years in higher risk situations.)        Colon cancer is now the second leading cause of cancer-related deaths in the [place] for both men and women and there are over 130,000 new cases and 50,000 deaths per year from colon cancer.  Colonoscopies can prevent 90-95% of these deaths.  Problem lesions can be removed before they ever become cancer.  This test is not only looking for cancer, but also getting rid of precancerious lesions.    If you are under/uninsured, we recommend you contact the [employer] program. [employer] is a free colorectal cancer screening program that provides colonoscopies for eligible under/uninsured [place] men and women. If you are interested in receiving a free colonoscopy, please call [employer] at [phone] (mention code [employer]) to see if you re eligible.      If you do not wish to do a colonoscopy or cannot afford to do one, at this time, there is another option. It is called a FIT test or Fecal Immunochemical Occult Blood Test (take home stool sample kit).  It does not replace the colonoscopy for colorectal cancer screening, but it can detect hidden bleeding in the lower colon.  It does need to be  repeated every year and if a positive result is obtained, you would be referred for a colonoscopy.          If you have completed either one of these tests at another facility, please call with the details of when and where the tests were done and if they were normal or not. Or have the records sent to our clinic so that we can best coordinate your care.         Please call us at the [employer] location:  [phone] or use [employer] to address the above recommendations.     Thank you for trusting [facility].  We appreciate the opportunity to serve you and look forward to supporting your healthcare in the future.    If you have (or plan to have) any of these tests done at a facility other than a [facility] or a [facility], please have the results sent to the [facility] location noted above.      Best Regards,    Tyrone Dunn MD

## 2018-06-14 ENCOUNTER — TELEPHONE (OUTPATIENT)
Dept: FAMILY MEDICINE | Facility: CLINIC | Age: 58
End: 2018-06-14

## 2018-06-14 NOTE — TELEPHONE ENCOUNTER
Panel Management Review      Patient has the following on his problem list:     Depression / Dysthymia review    Measure:  Needs PHQ-9 score of 4 or less during index window.  Administer PHQ-9 and if score is 5 or more, send encounter to provider for next steps.    5 - 7 month window range:     PHQ-9 SCORE 4/14/2017 7/26/2017   Total Score 5 5       If PHQ-9 recheck is 5 or more, route to provider for next steps.    Patient is due for:  PHQ9      IVD   ASA: FAILED    Last LDL:    Lab Results   Component Value Date    CHOL 238 11/09/2017     Lab Results   Component Value Date    HDL 56 11/09/2017     Lab Results   Component Value Date     11/09/2017     Lab Results   Component Value Date    TRIG 217 11/09/2017      No results found for: CHOLHDLRATIO     Is the patient on a Statin? YES   Is the patient on Aspirin? NO                  Medications     HMG CoA Reductase Inhibitors    atorvastatin (LIPITOR) 40 MG tablet          Last three blood pressure readings:  BP Readings from Last 3 Encounters:   11/11/17 128/84   07/26/17 112/72   05/01/17 (!) 152/96        Tobacco History:     History   Smoking Status     Never Smoker   Smokeless Tobacco     Never Used       Hypertension   Last three blood pressure readings:  BP Readings from Last 3 Encounters:   11/11/17 128/84   07/26/17 112/72   05/01/17 (!) 152/96     Blood pressure: MONITOR    HTN Guidelines:  Age 18-59 BP range:  Less than 140/90  Age 60-85 with Diabetes:  Less than 140/90  Age 60-85 without Diabetes:  less than 150/90      Composite cancer screening  Chart review shows that this patient is due/due soon for the following Colonoscopy  Summary:    Patient is due/failing the following:   BP CHECK, COLONOSCOPY, FOLLOW UP, PHQ9 and PHYSICAL    Action needed:   Patient needs office visit for annual physical., Patient needs to do PHQ9. and Patient needs referral/order: colonoscopy    Type of outreach:    Sent letter.    Questions for provider review:     None                                                                                                                                    Princess ANA MARÍA Holliday, [facility]       Chart routed to none .

## 2018-06-14 NOTE — LETTER
June 14, 2018    Mahesh Cabralgrecia  [address]   [facility] [address]    Dear Heaven Wilkes cares about your health and your health plan.  I have reviewed your medical conditions, medication list and lab results, and am making recommendations based on this review to better manage your health.    You are in particular need of attention regarding:  -Cholesterol  -Depression/Anxiety  -High Blood Pressure  -Colon Cancer Screening  -Wellness (Physical) Visit     I am recommending that you:     -schedule a FOLLOWUP OFFICE APPOINTMENT with me.       -schedule a WELLNESS (Physical) APPOINTMENT with me.   I will check fasting labs the same day - nothing to eat except water and meds for 8-10 hours prior.    -schedule a COLONOSCOPY to look for colon cancer (due every 10 years or 5 years in higher risk situations.)        Colon cancer is now the second leading cause of cancer-related deaths in the [place] for both men and women and there are over 130,000 new cases and 50,000 deaths per year from colon cancer.  Colonoscopies can prevent 90-95% of these deaths.  Problem lesions can be removed before they ever become cancer.  This test is not only looking for cancer, but also getting rid of precancerious lesions.    If you are under/uninsured, we recommend you contact the [employer] program. [employer] is a free colorectal cancer screening program that provides colonoscopies for eligible under/uninsured [place] men and women. If you are interested in receiving a free colonoscopy, please call [employer] at [phone] (mention code [employer]) to see if you re eligible.      If you do not wish to do a colonoscopy or cannot afford to do one, at this time, there is another option. It is called a FIT test or Fecal Immunochemical Occult Blood Test (take home stool sample kit).  It does not replace the colonoscopy for colorectal cancer screening, but it can detect hidden bleeding in the lower colon.  It does need to  be repeated every year and if a positive result is obtained, you would be referred for a colonoscopy.          If you have completed either one of these tests at another facility, please call with the details of when and where the tests were done and if they were normal or not. Or have the records sent to our clinic so that we can best coordinate your care.    Please complete the enclosed PHQ9 and mail back to clinic in the envelope provided.         Please call us at the [employer] location:  [phone] or use [employer] to address the above recommendations.     Thank you for trusting [facility].  We appreciate the opportunity to serve you and look forward to supporting your healthcare in the future.    If you have (or plan to have) any of these tests done at a facility other than a [facility] or a [facility], please have the results sent to the [facility] location noted above.      Best Regards,    Tyrone Dunn MD

## 2018-06-29 ENCOUNTER — OFFICE VISIT (OUTPATIENT)
Dept: FAMILY MEDICINE | Facility: CLINIC | Age: 58
End: 2018-06-29
Payer: COMMERCIAL

## 2018-06-29 VITALS
WEIGHT: 214.5 LBS | OXYGEN SATURATION: 97 % | TEMPERATURE: 98.1 F | DIASTOLIC BLOOD PRESSURE: 78 MMHG | HEIGHT: 70 IN | HEART RATE: 113 BPM | RESPIRATION RATE: 20 BRPM | BODY MASS INDEX: 30.71 KG/M2 | SYSTOLIC BLOOD PRESSURE: 112 MMHG

## 2018-06-29 DIAGNOSIS — E29.1 HYPOGONADISM MALE: ICD-10-CM

## 2018-06-29 DIAGNOSIS — F33.41 RECURRENT MAJOR DEPRESSIVE DISORDER, IN PARTIAL REMISSION (H): ICD-10-CM

## 2018-06-29 DIAGNOSIS — E78.5 HYPERLIPIDEMIA LDL GOAL <100: ICD-10-CM

## 2018-06-29 DIAGNOSIS — I10 ESSENTIAL HYPERTENSION, BENIGN: ICD-10-CM

## 2018-06-29 DIAGNOSIS — Z00.00 ENCOUNTER FOR ROUTINE ADULT HEALTH EXAMINATION WITHOUT ABNORMAL FINDINGS: Primary | ICD-10-CM

## 2018-06-29 DIAGNOSIS — F32.A DEPRESSION, UNSPECIFIED DEPRESSION TYPE: ICD-10-CM

## 2018-06-29 DIAGNOSIS — Z12.11 SPECIAL SCREENING FOR MALIGNANT NEOPLASMS, COLON: ICD-10-CM

## 2018-06-29 DIAGNOSIS — Z85.47 HISTORY OF TESTICULAR CANCER: ICD-10-CM

## 2018-06-29 PROCEDURE — 99396 PREV VISIT EST AGE 40-64: CPT | Performed by: FAMILY MEDICINE

## 2018-06-29 RX ORDER — LISINOPRIL 5 MG/1
5 TABLET ORAL DAILY
Qty: 90 TABLET | Refills: 3 | Status: SHIPPED | OUTPATIENT
Start: 2018-06-29 | End: 2019-06-12

## 2018-06-29 RX ORDER — DULOXETIN HYDROCHLORIDE 60 MG/1
60 CAPSULE, DELAYED RELEASE ORAL DAILY
Qty: 90 CAPSULE | Refills: 1 | Status: SHIPPED | OUTPATIENT
Start: 2018-06-29 | End: 2018-12-03

## 2018-06-29 RX ORDER — TESTOSTERONE 1.62 MG/G
2 GEL TRANSDERMAL DAILY
Qty: 75 G | Refills: 3 | Status: SHIPPED | OUTPATIENT
Start: 2018-06-29 | End: 2018-12-03

## 2018-06-29 RX ORDER — ATORVASTATIN CALCIUM 40 MG/1
40 TABLET, FILM COATED ORAL DAILY
Qty: 90 TABLET | Refills: 3 | Status: CANCELLED | OUTPATIENT
Start: 2018-06-29

## 2018-06-29 ASSESSMENT — PATIENT HEALTH QUESTIONNAIRE - PHQ9
10. IF YOU CHECKED OFF ANY PROBLEMS, HOW DIFFICULT HAVE THESE PROBLEMS MADE IT FOR YOU TO DO YOUR WORK, TAKE CARE OF THINGS AT HOME, OR GET ALONG WITH OTHER PEOPLE: SOMEWHAT DIFFICULT
SUM OF ALL RESPONSES TO PHQ QUESTIONS 1-9: 5
SUM OF ALL RESPONSES TO PHQ QUESTIONS 1-9: 5

## 2018-06-29 ASSESSMENT — ANXIETY QUESTIONNAIRES
5. BEING SO RESTLESS THAT IT IS HARD TO SIT STILL: NOT AT ALL
GAD7 TOTAL SCORE: 2
2. NOT BEING ABLE TO STOP OR CONTROL WORRYING: NOT AT ALL
1. FEELING NERVOUS, ANXIOUS, OR ON EDGE: SEVERAL DAYS
GAD7 TOTAL SCORE: 2
7. FEELING AFRAID AS IF SOMETHING AWFUL MIGHT HAPPEN: NOT AT ALL
4. TROUBLE RELAXING: NOT AT ALL
GAD7 TOTAL SCORE: 2
6. BECOMING EASILY ANNOYED OR IRRITABLE: SEVERAL DAYS
3. WORRYING TOO MUCH ABOUT DIFFERENT THINGS: NOT AT ALL
7. FEELING AFRAID AS IF SOMETHING AWFUL MIGHT HAPPEN: NOT AT ALL

## 2018-06-29 NOTE — LETTER
My Depression Action Plan  Name: Mahesh Porter   Date of Birth 1960  Date: 6/29/2018    My doctor: Giovanny Chambers [facility] [facility]   [facility]  [address] [address]  [phone]          GREEN    ZONE   Good Control    What it looks like:     Things are going generally well. You have normal up s and down s. You may even feel depressed from time to time, but bad moods usually last less than a day.   What you need to do:  1. Continue to care for yourself (see self care plan)  2. Check your depression survival kit and update it as needed  3. Follow your physician s recommendations including any medication.  4. Do not stop taking medication unless you consult with your physician first.           YELLOW         ZONE Getting Worse    What it looks like:     Depression is starting to interfere with your life.     It may be hard to get out of bed; you may be starting to isolate yourself from others.    Symptoms of depression are starting to last most all day and this has happened for several days.     You may have suicidal thoughts but they are not constant.   What you need to do:     1. Call your care team, your response to treatment will improve if you keep your care team informed of your progress. Yellow periods are signs an adjustment may need to be made.     2. Continue your self-care, even if you have to fake it!    3. Talk to someone in your support network    4. Open up your depression survival kit           RED    ZONE Medical Alert - Get Help    What it looks like:     Depression is seriously interfering with your life.     You may experience these or other symptoms: You can t get out of bed most days, can t work or engage in other necessary activities, you have trouble taking care of basic hygiene, or basic responsibilities, thoughts of suicide or death that will not go away, self-injurious behavior.      What you need to do:  1. Call your care team and request a same-day appointment. If they are not available (weekends or after hours) call your local crisis line, emergency room or 911.            Depression Self Care Plan / Survival Kit    Self-Care for Depression  Here s the deal. Your body and mind are really not as separate as most people think.  What you do and think affects how you feel and how you feel influences what you do and think. This means if you do things that people who feel good do, it will help you feel better.  Sometimes this is all it takes.  There is also a place for medication and therapy depending on how severe your depression is, so be sure to consult with your medical provider and/ or Behavioral Health Consultant if your symptoms are worsening or not improving.     In order to better manage my stress, I will:    Exercise  Get some form of exercise, every day. This will help reduce pain and release endorphins, the  feel good  chemicals in your brain. This is almost as good as taking antidepressants!  This is not the same as joining a gym and then never going! (they count on that by the way ) It can be as simple as just going for a walk or doing some gardening, anything that will get you moving.      Hygiene   Maintain good hygiene (Get out of bed in the morning, Make your bed, Brush your teeth, Take a shower, and Get dressed like you were going to work, even if you are unemployed).  If your clothes don't fit try to get ones that do.    Diet  I will strive to eat foods that are good for me, drink plenty of water, and avoid excessive sugar, caffeine, alcohol, and other mood-altering substances.  Some foods that are helpful in depression are: complex carbohydrates, B vitamins, flaxseed, fish or fish oil, fresh fruits and vegetables.    Psychotherapy  I agree to participate in Individual Therapy (if recommended).    Medication  If prescribed medications, I agree to take them.  Missing doses can  result in serious side effects.  I understand that drinking alcohol, or other illicit drug use, may cause potential side effects.  I will not stop my medication abruptly without first discussing it with my provider.    Staying Connected With Others  I will stay in touch with my friends, family members, and my primary care provider/team.    Use your imagination  Be creative.  We all have a creative side; it doesn t matter if it s oil painting, sand castles, or mud pies! This will also kick up the endorphins.    Witness Beauty  (AKA stop and smell the roses) Take a look outside, even in mid-winter. Notice colors, textures. Watch the squirrels and birds.     Service to others  Be of service to others.  There is always someone else in need.  By helping others we can  get out of ourselves  and remember the really important things.  This also provides opportunities for practicing all the other parts of the program.    Humor  Laugh and be silly!  Adjust your TV habits for less news and crime-drama and more comedy.    Control your stress  Try breathing deep, massage therapy, biofeedback, and meditation. Find time to relax each day.     My support system    Clinic Contact:  Phone number:    Contact 1:  Phone number:    Contact 2:  Phone number:    Nondenominational/:  Phone number:    Therapist:  Phone number:    Local crisis center:    Phone number:    Other community support:  Phone number:

## 2018-06-29 NOTE — NURSING NOTE
Advance Care Planning 6/29/2018 : ACP Review of Chart / Resources Provided:  Reviewed chart for advance care plan.  Mahesh Porter has been provided information and resources to begin or update their advance care plan.  Added by Princess ANA MARÍA Holliday

## 2018-06-29 NOTE — MR AVS SNAPSHOT
After Visit Summary   6/29/2018    Mahesh Porter    MRN: [id number]           Patient Information     Date Of Birth          1960        Visit Information        Provider Department      6/29/2018 1:15 PM Tyrone Dunn MD [facility]        Today's Diagnoses     Encounter for routine adult health examination without abnormal findings    -  1    Hyperlipidemia LDL goal <100        Depression, unspecified depression type        Recurrent major depressive disorder, in partial remission (H)        Essential hypertension, benign        History of testicular cancer        Hypogonadism male        Special screening for malignant neoplasms, colon          Care Instructions      Preventive Health Recommendations  Male Ages 50 - 64    Yearly exam:             See your health care provider every year in order to  o   Review health changes.   o   Discuss preventive care.    o   Review your medicines if your doctor has prescribed any.     Have a cholesterol test every 5 years, or more frequently if you are at risk for high cholesterol/heart disease.     Have a diabetes test (fasting glucose) every three years. If you are at risk for diabetes, you should have this test more often.     Have a colonoscopy at age 50, or have a yearly FIT test (stool test). These exams will check for colon cancer.      Talk with your health care provider about whether or not a prostate cancer screening test (PSA) is right for you.    You should be tested each year for STDs (sexually transmitted diseases), if you re at risk.     Shots: Get a flu shot each year. Get a tetanus shot every 10 years.     Nutrition:    Eat at least 5 servings of fruits and vegetables daily.     Eat whole-grain bread, whole-wheat pasta and brown rice instead of white grains and rice.     Get adequate Calcium and Vitamin D.     Lifestyle    Exercise for at least 150 minutes a week (30 minutes a day, 5 days a week). This will  help you control your weight and prevent disease.     Limit alcohol to one drink per day.     No smoking.     Wear sunscreen to prevent skin cancer.     See your dentist every six months for an exam and cleaning.     See your eye doctor every 1 to 2 years.            Follow-ups after your visit        Additional Services     GASTROENTEROLOGY ADULT REF PROCEDURE ONLY Hilary Ryan [phone]; [facility]       Last Lab Result: Creatinine (mg/dL)       Date                     Value                 05/01/2017               1.16             ----------  Body mass index is 30.56 kg/(m^2).     Needed:  No  Language:  English    Patient will be contacted to schedule procedure.     Please be aware that coverage of these services is subject to the terms and limitations of your health insurance plan.  Call member services at your health plan with any benefit or coverage questions.  Any procedures must be performed at a [facility] facility OR coordinated by your clinic's referral office.    Please bring the following with you to your appointment:    (1) Any X-Rays, CTs or MRIs which have been performed.  Contact the facility where they were done to arrange for  prior to your scheduled appointment.    (2) List of current medications   (3) This referral request   (4) Any documents/labs given to you for this referral            MENTAL HEALTH REFERRAL  - Adult; Psychiatry and Medication Management; Psychiatry; Other: Behavioral Healthcare Providers [phone]; We will contact you to schedule the appointment or please call with any questions       All scheduling is subject to the client's specific insurance plan & benefits, provider/location availability, and provider clinical specialities.  Please arrive 15 minutes early for your first appointment and bring your completed paperwork.    Please be aware that coverage of these services is subject to the terms and limitations of your health  insurance plan.  Call member services at your health plan with any benefit or coverage questions.                            Future tests that were ordered for you today     Open Future Orders        Priority Expected Expires Ordered    Lipid panel reflex to direct LDL Fasting Routine 7/2/2018 7/13/2018 6/29/2018    Basic metabolic panel Routine 7/2/2018 7/13/2018 6/29/2018    TSH with free T4 reflex Routine 7/2/2018 7/13/2018 6/29/2018    Prostate spec antigen screen Routine 7/2/2018 7/13/2018 6/29/2018    CBC with platelets Routine 7/2/2018 7/13/2018 6/29/2018    Testosterone, total Routine 7/2/2018 7/13/2018 6/29/2018    Vitamin D Deficiency Routine 7/2/2018 7/13/2018 6/29/2018            Who to contact     If you have questions or need follow up information about today's clinic visit or your schedule please contact [facility] directly at [phone].  Normal or non-critical lab and imaging results will be communicated to you by e-SENShart, letter or phone within 4 business days after the clinic has received the results. If you do not hear from us within 7 days, please contact the clinic through [employer] or phone. If you have a critical or abnormal lab result, we will notify you by phone as soon as possible.  Submit refill requests through [employer] or call your pharmacy and they will forward the refill request to us. Please allow 3 business days for your refill to be completed.          Additional Information About Your Visit        Banno Information     [employer] gives you secure access to your electronic health record. If you see a primary care provider, you can also send messages to your care team and make appointments. If you have questions, please call your primary care clinic.  If you do not have a primary care provider, please call [phone] and they will assist you.        Care EveryWhere ID     This is your Care EveryWhere ID. This could be used by other organizations to  "access your [facility] medical records  [id number]        Your Vitals Were     Pulse Temperature Respirations Height Pulse Oximetry BMI (Body Mass Index)    113 98.1  F (36.7  C) (Tympanic) 20 5' 10.25\" (1.784 m) 97% 30.56 kg/m2       Blood Pressure from Last 3 Encounters:   06/29/18 112/78   11/11/17 128/84   07/26/17 112/72    Weight from Last 3 Encounters:   06/29/18 214 lb 8 oz (97.3 kg)   11/11/17 215 lb (97.5 kg)   07/26/17 218 lb (98.9 kg)              We Performed the Following     DEPRESSION ACTION PLAN (DAP)     GASTROENTEROLOGY ADULT REF PROCEDURE ONLY Hilary Ryan [phone]; [facility]     MENTAL HEALTH REFERRAL  - Adult; Psychiatry and Medication Management; Psychiatry; Other: [facility] [phone]; We will contact you to schedule the appointment or please call with any questions          Where to get your medicines      These medications were sent to [facility] [address] [place], [place] [address] AT [facility] [address], [facility] [address]     Phone:  [phone]     DULoxetine 60 MG EC capsule    lisinopril 5 MG tablet          Primary Care Provider Office Phone # Fax #    [facility] [phone] [address] [facility] [address]        Equal Access to Services     BARON LINDER AH: Hadii aad ku hadasho Soomaali, waaxda luqadaha, qaybta kaalmada adeegyada, waxay clairin hayshielan lily duvall'micah allen. So [facility] [phone].    ATENCIÓN: Si habla español, tiene a more disposición servicios gratuitos de asistencia lingüística. Llame al [phone].    We comply with applicable federal civil rights laws and [place] laws. We do not discriminate on the basis of race, color, national origin, age, disability, sex, sexual orientation, or gender identity.            Thank you!     Thank you for choosing [facility]  for your care. " Our goal is always to provide you with excellent care. Hearing back from our patients is one way we can continue to improve our services. Please take a few minutes to complete the written survey that you may receive in the mail after your visit with us. Thank you!             Your Updated Medication List - Protect others around you: Learn how to safely use, store and throw away your medicines at www.disposemymeds.org.          This list is accurate as of 6/29/18  1:54 PM.  Always use your most recent med list.                   Brand Name Dispense Instructions for use Diagnosis    atorvastatin 40 MG tablet    LIPITOR    90 tablet    Take 1 tablet (40 mg) by mouth daily    Hyperlipidemia LDL goal <100       buPROPion 150 MG 24 hr tablet    WELLBUTRIN XL    90 tablet    Take 1 tablet (150 mg) by mouth every morning    Depression, unspecified depression type       DHA-EPA-VITAMIN E PO      Take 1 capsule by mouth        DULoxetine 60 MG EC capsule    CYMBALTA    90 capsule    Take 1 capsule (60 mg) by mouth daily    Recurrent major depressive disorder, in partial remission (H)       fenofibrate 54 MG tablet     90 tablet    Take 1 tablet (54 mg) by mouth daily    Hyperlipidemia LDL goal <100       lisinopril 5 MG tablet    PRINIVIL/ZESTRIL    90 tablet    Take 1 tablet (5 mg) by mouth daily    Essential hypertension, benign       MULTI-VITAMINS Tabs      Take 1 tablet by mouth        PROBIOTIC & ACIDOPHILUS EX ST Caps      Take 1 capsule by mouth        sildenafil 20 MG tablet    REVATIO    90 tablet    3 prior to intercourse as needed.    Other post-procedural erectile dysfunction       testosterone 20.25 MG/ACT gel    ANDROGEL 1.62% PUMP    75 g    Place 2 pumps (40.5 mg) onto the skin daily Apply from dispenser to clean, dry, intact skin of the upper arms and shoulders.    Hypogonadism male

## 2018-06-29 NOTE — PROGRESS NOTES
SUBJECTIVE:   CC: Mahesh Porter is an 58 year old male who presents for preventative health visit.     Physical   Annual:     Getting at least 3 servings of Calcium per day:  NO    Bi-annual eye exam:  NO    Dental care twice a year:  Yes    Sleep apnea or symptoms of sleep apnea:  Sleep apnea    Diet:  Regular (no restrictions)    Frequency of exercise:  1 day/week    Duration of exercise:  45-60 minutes    Taking medications regularly:  Yes    Medication side effects:  Muscle aches    Additional concerns today:  No      Hyperlipidemia Follow-Up      Rate your low fat/cholesterol diet?: poor    Taking statin?  Stopped due to elbow pain    Other lipid medications/supplements?:  Fenofibrate, without side effects    Elbow pain did stop after he stopped the Atorvastatin     Hypertension Follow-up      Outpatient blood pressures are being checked at clinic once a month.  Results are high.    Low Salt Diet: not monitoring salt    Pt has been on antidepressant medications for over 20 years.  He is not sure that they are working as well as should be.  He is requesting psychiatry referral to discuss medication    Today's PHQ-2 Score:   PHQ-2 ( 1999 [employer]) 6/22/2018   Q1: Little interest or pleasure in doing things 1   Q2: Feeling down, depressed or hopeless 1   PHQ-2 Score 2   Q1: Little interest or pleasure in doing things Several days   Q2: Feeling down, depressed or hopeless Several days   PHQ-2 Score 2   Answers for HPI/ROS submitted by the patient on 6/22/2018   PHQ-2 Score: 2      Abuse: Current or Past(Physical, Sexual or Emotional)- No  Do you feel safe in your environment - Yes    Social History   Substance Use Topics     Smoking status: Never Smoker     Smokeless tobacco: Never Used     Alcohol use Yes      Comment: a drink a day     Alcohol Use 6/22/2018   If you drink alcohol do you typically have greater than 3 drinks per day OR greater than 7 drinks per week? No   No flowsheet data found.    Last PSA: No  "results found for: PSA    Reviewed orders with patient. Reviewed health maintenance and updated orders accordingly - Yes  Labs reviewed in EPIC    Reviewed and updated as needed this visit by clinical staff  Tobacco  Allergies  Meds  Med Hx  Surg Hx  Fam Hx  Soc Hx        Reviewed and updated as needed this visit by Provider            Review of Systems  CONSTITUTIONAL: NEGATIVE for fever, chills, change in weight  INTEGUMENTARY/SKIN: NEGATIVE for worrisome rashes, moles or lesions  EYES: NEGATIVE for vision changes or irritation  ENT: NEGATIVE for ear, mouth and throat problems  RESP: NEGATIVE for significant cough or SOB  CV: NEGATIVE for chest pain, palpitations or peripheral edema  GI: NEGATIVE for nausea, abdominal pain, heartburn, or change in bowel habits   male: erectile dysfunction, decreased urinary stream, hesitancy and nocturia x 1  MUSCULOSKELETAL: NEGATIVE for significant arthralgias or myalgia  NEURO: NEGATIVE for weakness, dizziness or paresthesias  PSYCHIATRIC: POSITIVE fordepressed mood and Hx depression    OBJECTIVE:   /78 (BP Location: Right arm)  Pulse 113  Temp 98.1  F (36.7  C) (Tympanic)  Resp 20  Ht 5' 10.25\" (1.784 m)  Wt 214 lb 8 oz (97.3 kg)  SpO2 97%  BMI 30.56 kg/m2    Physical Exam  GENERAL: healthy, alert and no distress  EYES: Eyes grossly normal to inspection, PERRL and conjunctivae and sclerae normal  HENT: ear canals and TM's normal, nose and mouth without ulcers or lesions  NECK: no adenopathy, no asymmetry, masses, or scars and thyroid normal to palpation  RESP: lungs clear to auscultation - no rales, rhonchi or wheezes  CV: regular rate and rhythm, normal S1 S2, no S3 or S4, no murmur, click or rub, no peripheral edema and peripheral pulses strong  ABDOMEN: soft, nontender, no hepatosplenomegaly, no masses and bowel sounds normal   (male): no hernias, penis normal without urethral discharge and surgical absence of Rt testicle, prosthetic testicle in " place.  Atrophy of Lt testicle  RECTAL: normal sphincter tone, no rectal masses and prostate of normal size for age, smooth, nontender without masses/nodules  MS: no gross musculoskeletal defects noted, no edema  SKIN: no suspicious lesions or rashes  NEURO: Normal strength and tone, mentation intact and speech normal  PSYCH: mentation appears normal and affect flat    Lab: see below, results pending    ASSESSMENT/PLAN:   Mahesh was seen today for physical.    Diagnoses and all orders for this visit:    Encounter for routine adult health examination without abnormal findings  -     TSH with free T4 reflex; Future  -     Prostate spec antigen screen; Future  -     CBC with platelets; Future  -     Vitamin D Deficiency; Future    Hyperlipidemia LDL goal <100  -     Lipid panel reflex to direct LDL Fasting; Future    Depression, unspecified depression type    Recurrent major depressive disorder, in partial remission (H)  -     DULoxetine (CYMBALTA) 60 MG EC capsule; Take 1 capsule (60 mg) by mouth daily  -     MENTAL HEALTH REFERRAL  - Adult; Psychiatry and Medication Management; Psychiatry; Other: [facility] [phone]; We will contact you to schedule the appointment or please call with any questions    Essential hypertension, benign  -     lisinopril (PRINIVIL/ZESTRIL) 5 MG tablet; Take 1 tablet (5 mg) by mouth daily  -     Basic metabolic panel; Future    History of testicular cancer    Hypogonadism male  -     Testosterone, total; Future  -     testosterone (ANDROGEL 1.62% PUMP) 20.25 MG/ACT gel; Place 2 pumps (40.5 mg) onto the skin daily Apply from dispenser to clean, dry, intact skin of the upper arms and shoulders.    Special screening for malignant neoplasms, colon  -     GASTROENTEROLOGY ADULT REF PROCEDURE ONLY Hilary Ryan [phone]; [facility]    Other orders  -     Cancel: atorvastatin (LIPITOR) 40 MG tablet; Take 1 tablet (40 mg) by mouth daily  -      "DEPRESSION ACTION PLAN (DAP)        COUNSELING:   Reviewed preventive health counseling, as reflected in patient instructions       Regular exercise       Healthy diet/nutrition       Prostate cancer screening   Referred also for colonoscopy for colon cancer screening    BP Readings from Last 1 Encounters:   06/29/18 112/78     Estimated body mass index is 30.56 kg/(m^2) as calculated from the following:    Height as of this encounter: 5' 10.25\" (1.784 m).    Weight as of this encounter: 214 lb 8 oz (97.3 kg).      Weight management plan: Discussed healthy diet and exercise guidelines and patient will follow up in 12 months in clinic to re-evaluate.     reports that he has never smoked. He has never used smokeless tobacco.      Counseling Resources:  ATP IV Guidelines  Pooled Cohorts Equation Calculator  FRAX Risk Assessment  ICSI Preventive Guidelines  Dietary Guidelines for Americans, 2010  USDA's MyPlate  ASA Prophylaxis  Lung CA Screening    Tyrone Dunn MD  [facility]  "

## 2018-06-30 ASSESSMENT — ANXIETY QUESTIONNAIRES: GAD7 TOTAL SCORE: 2

## 2018-06-30 ASSESSMENT — PATIENT HEALTH QUESTIONNAIRE - PHQ9: SUM OF ALL RESPONSES TO PHQ QUESTIONS 1-9: 5

## 2018-07-06 DIAGNOSIS — E29.1 HYPOGONADISM MALE: ICD-10-CM

## 2018-07-06 DIAGNOSIS — I10 ESSENTIAL HYPERTENSION, BENIGN: ICD-10-CM

## 2018-07-06 DIAGNOSIS — Z00.00 ENCOUNTER FOR ROUTINE ADULT HEALTH EXAMINATION WITHOUT ABNORMAL FINDINGS: ICD-10-CM

## 2018-07-06 DIAGNOSIS — E78.5 HYPERLIPIDEMIA LDL GOAL <100: ICD-10-CM

## 2018-07-06 LAB
ANION GAP SERPL CALCULATED.3IONS-SCNC: 9 MMOL/L (ref 3–14)
BUN SERPL-MCNC: 16 MG/DL (ref 7–30)
CALCIUM SERPL-MCNC: 8.2 MG/DL (ref 8.5–10.1)
CHLORIDE SERPL-SCNC: 108 MMOL/L (ref 94–109)
CHOLEST SERPL-MCNC: 286 MG/DL
CO2 SERPL-SCNC: 26 MMOL/L (ref 20–32)
CREAT SERPL-MCNC: 1.32 MG/DL (ref 0.66–1.25)
DEPRECATED CALCIDIOL+CALCIFEROL SERPL-MC: 30 UG/L (ref 20–75)
ERYTHROCYTE [DISTWIDTH] IN BLOOD BY AUTOMATED COUNT: 13.5 % (ref 10–15)
GFR SERPL CREATININE-BSD FRML MDRD: 56 ML/MIN/1.7M2
GLUCOSE SERPL-MCNC: 117 MG/DL (ref 70–99)
HCT VFR BLD AUTO: 43.2 % (ref 40–53)
HDLC SERPL-MCNC: 44 MG/DL
HGB BLD-MCNC: 14.1 G/DL (ref 13.3–17.7)
LDLC SERPL CALC-MCNC: 181 MG/DL
MCH RBC QN AUTO: 30.2 PG (ref 26.5–33)
MCHC RBC AUTO-ENTMCNC: 32.6 G/DL (ref 31.5–36.5)
MCV RBC AUTO: 93 FL (ref 78–100)
NONHDLC SERPL-MCNC: 242 MG/DL
PLATELET # BLD AUTO: 274 10E9/L (ref 150–450)
POTASSIUM SERPL-SCNC: 4.6 MMOL/L (ref 3.4–5.3)
PSA SERPL-ACNC: 2.81 UG/L (ref 0–4)
RBC # BLD AUTO: 4.67 10E12/L (ref 4.4–5.9)
SODIUM SERPL-SCNC: 143 MMOL/L (ref 133–144)
TRIGL SERPL-MCNC: 307 MG/DL
TSH SERPL DL<=0.005 MIU/L-ACNC: 0.93 MU/L (ref 0.4–4)
WBC # BLD AUTO: 8.9 10E9/L (ref 4–11)

## 2018-07-06 PROCEDURE — 80061 LIPID PANEL: CPT | Performed by: FAMILY MEDICINE

## 2018-07-06 PROCEDURE — 85027 COMPLETE CBC AUTOMATED: CPT | Performed by: FAMILY MEDICINE

## 2018-07-06 PROCEDURE — 84403 ASSAY OF TOTAL TESTOSTERONE: CPT | Performed by: FAMILY MEDICINE

## 2018-07-06 PROCEDURE — 36415 COLL VENOUS BLD VENIPUNCTURE: CPT | Performed by: FAMILY MEDICINE

## 2018-07-06 PROCEDURE — 80048 BASIC METABOLIC PNL TOTAL CA: CPT | Performed by: FAMILY MEDICINE

## 2018-07-06 PROCEDURE — G0103 PSA SCREENING: HCPCS | Performed by: FAMILY MEDICINE

## 2018-07-06 PROCEDURE — 82306 VITAMIN D 25 HYDROXY: CPT | Performed by: FAMILY MEDICINE

## 2018-07-06 PROCEDURE — 84443 ASSAY THYROID STIM HORMONE: CPT | Performed by: FAMILY MEDICINE

## 2018-07-09 LAB — TESTOST SERPL-MCNC: 725 NG/DL (ref 240–950)

## 2018-07-27 ENCOUNTER — OFFICE VISIT (OUTPATIENT)
Dept: FAMILY MEDICINE | Facility: CLINIC | Age: 58
End: 2018-07-27
Payer: COMMERCIAL

## 2018-07-27 VITALS
OXYGEN SATURATION: 97 % | DIASTOLIC BLOOD PRESSURE: 74 MMHG | HEART RATE: 101 BPM | BODY MASS INDEX: 30.84 KG/M2 | RESPIRATION RATE: 16 BRPM | WEIGHT: 216.5 LBS | TEMPERATURE: 97.8 F | SYSTOLIC BLOOD PRESSURE: 130 MMHG

## 2018-07-27 DIAGNOSIS — E78.00 HYPERCHOLESTEROLEMIA: Primary | ICD-10-CM

## 2018-07-27 PROCEDURE — 99213 OFFICE O/P EST LOW 20 MIN: CPT | Performed by: FAMILY MEDICINE

## 2018-07-27 RX ORDER — ROSUVASTATIN CALCIUM 5 MG/1
5 TABLET, COATED ORAL DAILY
Qty: 90 TABLET | Refills: 1 | Status: SHIPPED | OUTPATIENT
Start: 2018-07-27 | End: 2019-04-15

## 2018-07-27 NOTE — PROGRESS NOTES
SUBJECTIVE:   Mahesh Porter is a 58 year old male who presents to clinic today for the following health issues:      Cholesterol Results      Duration: done last visit    Description (location/character/radiation): Here to discuss results    Intensity:  mild    Accompanying signs and symptoms:     History (similar episodes/previous evaluation): YES    Precipitating or alleviating factors: None    Therapies tried and outcome: Atorvastatin but stopped due to elbow pain   Pt also remembers he had side effects when tried on Simvastatin in past            Problem list and histories reviewed & adjusted, as indicated.  Additional history: as documented    Labs reviewed in EPIC    Reviewed and updated as needed this visit by clinical staff       Reviewed and updated as needed this visit by Provider         ROS:  CONSTITUTIONAL:NEGATIVE for fever, chills, change in weight  RESP:NEGATIVE for significant cough or SOB  CV: NEGATIVE for chest pain, palpitations or peripheral edema  MUSCULOSKELETAL: NEGATIVE for significant arthralgias or myalgia    OBJECTIVE:                                                    /74 (BP Location: Right arm, Patient Position: Sitting, Cuff Size: Adult Large)  Pulse 101  Temp 97.8  F (36.6  C) (Tympanic)  Resp 16  Wt 216 lb 8 oz (98.2 kg)  SpO2 97%  BMI 30.84 kg/m2  Body mass index is 30.84 kg/(m^2).  GENERAL APPEARANCE: healthy, alert and no distress  RESP: lungs clear to auscultation - no rales, rhonchi or wheezes  CV: regular rates and rhythm, normal S1 S2, no S3 or S4 and no murmur, click or rub  MS: extremities normal- no gross deformities noted    Diagnostic test results:  none      ASSESSMENT/PLAN:                                                        ICD-10-CM    1. Hypercholesterolemia E78.00 rosuvastatin (CRESTOR) 5 MG tablet     Lipid panel reflex to direct LDL Fasting     ALT     AST       Follow up with Provider - 3 mo  Discussed with Pt.  Start low dose.  If he tolerates  may need to increase dose to get effect   Patient Instructions   Start taking every other day for 1-2 weeks then increase to once daily       Tyrone Dunn MD  [facility]

## 2018-07-27 NOTE — MR AVS SNAPSHOT
After Visit Summary   7/27/2018    Mahesh Porter    MRN: [id number]           Patient Information     Date Of Birth          1960        Visit Information        Provider Department      7/27/2018 1:30 PM Tyrone Dunn MD [facility]        Today's Diagnoses     Hypercholesterolemia    -  1      Care Instructions    Start taking every other day for 1-2 weeks then increase to once daily           Follow-ups after your visit        Follow-up notes from your care team     Return in about 3 months (around 10/27/2018).      Your next 10 appointments already scheduled     Aug 22, 2018  8:00 AM CDT   [facility] New with Gino Romano MD   [facility]    [address] [place] [address] [phone]           You will receive a sleep questionnaire packet in the mail from your clinic prior to the scheduled visit with your sleep specialist. If you do not receive that packet within 10 days of the scheduled appointment time please call the clinic directly.  At your consultation visit, the provider will review your current symptoms to determine the most appropriate treatment and determine if a sleep test is appropriate. If it is the right choice for you, it will be determined if you can have a home sleep test or an in lab test. Prior to any of this happening, your insurance will have to be prior authorized if applicable.  ***If you have a sleep machine currently, please bring it with you to your appointment***              Future tests that were ordered for you today     Open Future Orders        Priority Expected Expires Ordered    Lipid panel reflex to direct LDL Fasting Routine  10/25/2018 7/27/2018    ALT Routine  10/25/2018 7/27/2018    AST Routine  10/25/2018 7/27/2018            Who to contact     If you have questions or need follow up information about today's clinic visit  or your schedule please contact [facility] directly at [phone].  Normal or non-critical lab and imaging results will be communicated to you by MyChart, letter or phone within 4 business days after the clinic has received the results. If you do not hear from us within 7 days, please contact the clinic through [employer] or phone. If you have a critical or abnormal lab result, we will notify you by phone as soon as possible.  Submit refill requests through [employer] or call your pharmacy and they will forward the refill request to us. Please allow 3 business days for your refill to be completed.          Additional Information About Your Visit        [facility] Information     [employer] gives you secure access to your electronic health record. If you see a primary care provider, you can also send messages to your care team and make appointments. If you have questions, please call your primary care clinic.  If you do not have a primary care provider, please call [phone] and they will assist you.        Care EveryWhere ID     This is your Care EveryWhere ID. This could be used by other organizations to access your [place] medical records  [id number]        Your Vitals Were     Pulse Temperature Respirations Pulse Oximetry BMI (Body Mass Index)       101 97.8  F (36.6  C) (Tympanic) 16 97% 30.84 kg/m2        Blood Pressure from Last 3 Encounters:   07/27/18 130/74   06/29/18 112/78   11/11/17 128/84    Weight from Last 3 Encounters:   07/27/18 216 lb 8 oz (98.2 kg)   06/29/18 214 lb 8 oz (97.3 kg)   11/11/17 215 lb (97.5 kg)                 Today's Medication Changes          These changes are accurate as of 7/27/18  1:58 PM.  If you have any questions, ask your nurse or doctor.               Start taking these medicines.        Dose/Directions    rosuvastatin 5 MG tablet   Commonly known as:  CRESTOR   Used for:  Hypercholesterolemia   Started by:  Tyrone Dunn MD        Dose:  5 mg    Take 1 tablet (5 mg) by mouth daily   Quantity:  90 tablet   Refills:  1            Where to get your medicines      These medications were sent to [facility] [facility] [address] AT [facility] [address], [facility] [address]     Phone:  [phone]     rosuvastatin 5 MG tablet                Primary Care Provider Office Phone # Fax #    [facility] [phone] [address] [facility] [address]        Equal Access to Services     ZEINAB LINDER : Hadii aad ku hadasho Soomaali, waaxda luqadaha, qaybta kaalmada adeegyada, waxdel selfin hayaan ademaicol arora . So [facility] [phone].    ATENCIÓN: Si habla español, tiene a more disposición servicios gratuitos de asistencia lingüística. Llame al [phone].    We comply with applicable federal civil rights laws and [place] laws. We do not discriminate on the basis of race, color, national origin, age, disability, sex, sexual orientation, or gender identity.            Thank you!     Thank you for choosing [facility]  for your care. Our goal is always to provide you with excellent care. Hearing back from our patients is one way we can continue to improve our services. Please take a few minutes to complete the written survey that you may receive in the mail after your visit with us. Thank you!             Your Updated Medication List - Protect others around you: Learn how to safely use, store and throw away your medicines at www.disposemymeds.org.          This list is accurate as of 7/27/18  1:58 PM.  Always use your most recent med list.                   Brand Name Dispense Instructions for use Diagnosis    atorvastatin 40 MG tablet    LIPITOR    90 tablet    Take 1 tablet (40 mg) by mouth daily    Hyperlipidemia LDL goal <100       buPROPion 150 MG 24 hr tablet    WELLBUTRIN XL    90 tablet    Take 1 tablet (150 mg)  by mouth every morning    Depression, unspecified depression type       DHA-EPA-VITAMIN E PO      Take 1 capsule by mouth        DULoxetine 60 MG EC capsule    CYMBALTA    90 capsule    Take 1 capsule (60 mg) by mouth daily    Recurrent major depressive disorder, in partial remission (H)       fenofibrate 54 MG tablet     90 tablet    Take 1 tablet (54 mg) by mouth daily    Hyperlipidemia LDL goal <100       lisinopril 5 MG tablet    PRINIVIL/ZESTRIL    90 tablet    Take 1 tablet (5 mg) by mouth daily    Essential hypertension, benign       MULTI-VITAMINS Tabs      Take 1 tablet by mouth        PROBIOTIC & ACIDOPHILUS EX ST Caps      Take 1 capsule by mouth        rosuvastatin 5 MG tablet    CRESTOR    90 tablet    Take 1 tablet (5 mg) by mouth daily    Hypercholesterolemia       sildenafil 20 MG tablet    REVATIO    90 tablet    3 prior to intercourse as needed.    Other post-procedural erectile dysfunction       testosterone 20.25 MG/ACT gel    ANDROGEL 1.62% PUMP    75 g    Place 2 pumps (40.5 mg) onto the skin daily Apply from dispenser to clean, dry, intact skin of the upper arms and shoulders.    Hypogonadism male

## 2018-07-30 ENCOUNTER — TRANSFERRED RECORDS (OUTPATIENT)
Dept: HEALTH INFORMATION MANAGEMENT | Facility: CLINIC | Age: 58
End: 2018-07-30

## 2018-08-22 ENCOUNTER — OFFICE VISIT (OUTPATIENT)
Dept: SLEEP MEDICINE | Facility: CLINIC | Age: 58
End: 2018-08-22
Payer: COMMERCIAL

## 2018-08-22 VITALS
RESPIRATION RATE: 16 BRPM | SYSTOLIC BLOOD PRESSURE: 135 MMHG | HEIGHT: 72 IN | HEART RATE: 88 BPM | WEIGHT: 215.6 LBS | BODY MASS INDEX: 29.2 KG/M2 | TEMPERATURE: 97.5 F | DIASTOLIC BLOOD PRESSURE: 82 MMHG | OXYGEN SATURATION: 95 %

## 2018-08-22 DIAGNOSIS — G47.33 OSA (OBSTRUCTIVE SLEEP APNEA): Primary | ICD-10-CM

## 2018-08-22 PROCEDURE — 99204 OFFICE O/P NEW MOD 45 MIN: CPT | Performed by: PSYCHIATRY & NEUROLOGY

## 2018-08-22 NOTE — PATIENT INSTRUCTIONS

## 2018-08-22 NOTE — NURSING NOTE
Chief Complaint   Patient presents with     Sleep Problem     RBD       Initial /82  Pulse 88  Temp 97.5  F (36.4  C) (Oral)  Resp 16  Ht 1.829 m (6')  Wt 97.8 kg (215 lb 9.6 oz)  SpO2 95%  BMI 29.24 kg/m2 Estimated body mass index is 29.24 kg/(m^2) as calculated from the following:    Height as of this encounter: 1.829 m (6').    Weight as of this encounter: 97.8 kg (215 lb 9.6 oz).    Medication Reconciliation: complete     ESS 4  Neck 43cm  Yoli Mwcilliams
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
## 2018-08-22 NOTE — MR AVS SNAPSHOT
After Visit Summary   8/22/2018    Mahesh Porter    MRN: [id number]           Patient Information     Date Of Birth          1960        Visit Information        Provider Department      8/22/2018 8:00 AM Gino Romano MD [facility]        Today's Diagnoses     MARY (obstructive sleep apnea)    -  1      Care Instructions      Your BMI is Body mass index is 29.24 kg/(m^2).  Weight management is a personal decision.  If you are interested in exploring weight loss strategies, the following discussion covers the approaches that may be successful. Body mass index (BMI) is one way to tell whether you are at a healthy weight, overweight, or obese. It measures your weight in relation to your height.  A BMI of 18.5 to 24.9 is in the healthy range. A person with a BMI of 25 to 29.9 is considered overweight, and someone with a BMI of 30 or greater is considered obese. More than two-thirds of American adults are considered overweight or obese.  Being overweight or obese increases the risk for further weight gain. Excess weight may lead to heart disease and diabetes.  Creating and following plans for healthy eating and physical activity may help you improve your health.  Weight control is part of healthy lifestyle and includes exercise, emotional health, and healthy eating habits. Careful eating habits lifelong are the mainstay of weight control. Though there are significant health benefits from weight loss, long-term weight loss with diet alone may be very difficult to achieve- studies show long-term success with dietary management in less than 10% of people. Attaining a healthy weight may be especially difficult to achieve in those with severe obesity. In some cases, medications, devices and surgical management might be considered.  What can you do?  If you are overweight or obese and are interested in methods for weight loss, you should discuss this with your provider.      Consider reducing daily calorie intake by 500 calories.     Keep a food journal.     Avoiding skipping meals, consider cutting portions instead.    Diet combined with exercise helps maintain muscle while optimizing fat loss. Strength training is particularly important for building and maintaining muscle mass. Exercise helps reduce stress, increase energy, and improves fitness. Increasing exercise without diet control, however, may not burn enough calories to loose weight.       Start walking three days a week 10-20 minutes at a time    Work towards walking thirty minutes five days a week     Eventually, increase the speed of your walking for 1-2 minutes at time    In addition, we recommend that you review healthy lifestyles and methods for weight loss available through the National Institutes of Health patient information sites:  http://win.niddk.nih.gov/publications/index.htm    And look into health and wellness programs that may be available through your health insurance provider, employer, local community center, or cecilio club.    Weight management plan: Patient was referred to their PCP to discuss a diet and exercise plan.            Follow-ups after your visit        Additional Services     SLEEP DENTAL REFERRAL       Dental appliance resources recommended by [facility]     Below is a list of dental appliance resources recommended by [facility]   If you wish to choose your own dental sleep dentist, you may identify a provider close to you: [email or web address]    [facility]   Sleep Medicine [facility]   Chidi Aragon DDS, [place] [address]   Appointments: [phone]  Fax: [phone]   Email: [email or web address]   Dental and Oral Surgery Clinic   Sebastián Rodarte, LORNA Mccracken DDS   [address]  [facility], [address]   [place] [address]   Appointments: [phone]   Website: [email or web address]    Snoring and Sleep Apnea   Dental Treatment Center   ERNST Vela DDS  [address], [address]   [place] [address]   Appointments: [phone]   Fax: [phone]   Email: [email or web address]  Website: [employer]     [place] [facility]   Office 1   Jack Melgar DDS - [DME Medicare]  [address], [address]   [place] [address]  Appointments: [phone]  Fax: [phone]  Website: [employer]    [facility]   Office 2  Jack Melgar DDS - [DME Medicare]  [address]  [place] [address]  Appointments: [phone]  Fax: [phone]  Website: [employer]    [facility]  Ubaldo Osorio DDS  [address] [place] [address]  Appointments: [phone]    [facility]   [place] Office   Eldon Mccracken DDS   [facility]   [address]   [place] [address]   Appointments: [phone]   Fax: [phone]   Website: [employer]     [facility]   [place] Office   Isauro Godfrey DDS, MS - [DME Medicare]  [facility]   [address]   [place] [address]   Appointments: [phone]   Fax: [phone]   Website: [employer]     Himanshu Carvajal DMD, MSD - [DME Medicare]  [address], [address]  [place] [address]  Appointments [phone]  Fax: [phone]  Website: [email or web address]    [facility]   [place] Office   Ellen Sheehan DDS, PhD, [facility]   [address] [place] [address], [address]   [place] [address]   Appointments: [phone]   Fax: [phone]   Website: [email or web address]     [facility]   [place] Office   Ellen Sheehan DDS, PhD,  MS [facility]   [address], [address]   [place] [address]   Appointments: [phone]   Fax: [phone]   Website: [employer]     [facility]  Glenna Nathan DDS  [facility]  [address] [place] [address]  Appointments: [phone]   Fax: [phone]    If you wish to choose your own dental sleep dentist, you may identify a provider close to you: [email or web address]      AHI: Mild (please see PSG report for Liana) PSG DATE: 2016    Other information regarding this referral: Mandibular repositioning appliance for MARY. If your insurance asks for a CPT code, it is .    Please be aware that coverage of these services is subject to the terms and limitations of your health insurance plan.  Call member services at your health plan with any benefit or coverage questions.      Please bring the following to your appointment:    >>   List of current medications   >>   This referral request   >>   Any documents/labs given to you for this referral                  Your next 10 appointments already scheduled     Aug 22, 2019  9:00 AM CDT   Return Sleep Patient with Gino Romano MD   [facility] ([facility])    [address] [place] [address] [phone]              Who to contact     If you have questions or need follow up information about today's clinic visit or your schedule please contact [facility] directly at [phone].  Normal or non-critical lab and imaging results will be communicated to you by MyChart, letter or phone within 4 business days after the clinic has received the results. If you do not hear from us within 7 days, please contact the clinic through MyChart or phone. If you have a critical or abnormal lab result, we will notify you by phone as soon as possible.  Submit refill requests  through [employer] or call your pharmacy and they will forward the refill request to us. Please allow 3 business days for your refill to be completed.          Additional Information About Your Visit        [facility] Information     [employer] gives you secure access to your electronic health record. If you see a primary care provider, you can also send messages to your care team and make appointments. If you have questions, please call your primary care clinic.  If you do not have a primary care provider, please call [phone] and they will assist you.        Care EveryWhere ID     This is your Care EveryWhere ID. This could be used by other organizations to access your [place] medical records  [id number]        Your Vitals Were     Pulse Temperature Respirations Height Pulse Oximetry BMI (Body Mass Index)    88 97.5  F (36.4  C) (Oral) 16 1.829 m (6') 95% 29.24 kg/m2       Blood Pressure from Last 3 Encounters:   08/22/18 135/82   07/27/18 130/74   06/29/18 112/78    Weight from Last 3 Encounters:   08/22/18 97.8 kg (215 lb 9.6 oz)   07/27/18 98.2 kg (216 lb 8 oz)   06/29/18 97.3 kg (214 lb 8 oz)              We Performed the Following     SLEEP DENTAL REFERRAL        Primary Care Provider Office Phone # Fax #    [facility] [phone] [address] [address]        Equal Access to Services     ZEINAB LINDER : Hadii aad ku jadono Sozachery, waaxda luqadaha, qaybta kaalmada adeegyada, gurmeet allen. So [facility] [phone].    ATENCIÓN: Si habla español, tiene a more disposición servicios gratuitos de asistencia lingüística. Llame al [phone].    We comply with applicable federal civil rights laws and [place] laws. We do not discriminate on the basis of race, color, national origin, age, disability, sex, sexual orientation, or gender identity.            Thank you!     Thank you for choosing [facility]   for your care. Our goal is always to provide you with excellent care. Hearing back from our patients is one way we can continue to improve our services. Please take a few minutes to complete the written survey that you may receive in the mail after your visit with us. Thank you!             Your Updated Medication List - Protect others around you: Learn how to safely use, store and throw away your medicines at www.disposemymeds.org.          This list is accurate as of 8/22/18  8:41 AM.  Always use your most recent med list.                   Brand Name Dispense Instructions for use Diagnosis    buPROPion 150 MG 24 hr tablet    WELLBUTRIN XL    90 tablet    Take 1 tablet (150 mg) by mouth every morning    Depression, unspecified depression type       DHA-EPA-VITAMIN E PO      Take 1 capsule by mouth        DULoxetine 60 MG EC capsule    CYMBALTA    90 capsule    Take 1 capsule (60 mg) by mouth daily    Recurrent major depressive disorder, in partial remission (H)       fenofibrate 54 MG tablet     90 tablet    Take 1 tablet (54 mg) by mouth daily    Hyperlipidemia LDL goal <100       lisinopril 5 MG tablet    PRINIVIL/ZESTRIL    90 tablet    Take 1 tablet (5 mg) by mouth daily    Essential hypertension, benign       MULTI-VITAMINS Tabs      Take 1 tablet by mouth        PROBIOTIC & ACIDOPHILUS EX ST Caps      Take 1 capsule by mouth        rosuvastatin 5 MG tablet    CRESTOR    90 tablet    Take 1 tablet (5 mg) by mouth daily    Hypercholesterolemia       sildenafil 20 MG tablet    REVATIO    90 tablet    3 prior to intercourse as needed.    Other post-procedural erectile dysfunction       testosterone 20.25 MG/ACT gel    ANDROGEL 1.62% PUMP    75 g    Place 2 pumps (40.5 mg) onto the skin daily Apply from dispenser to clean, dry, intact skin of the upper arms and shoulders.    Hypogonadism male

## 2018-08-28 ENCOUNTER — TRANSFERRED RECORDS (OUTPATIENT)
Dept: HEALTH INFORMATION MANAGEMENT | Facility: CLINIC | Age: 58
End: 2018-08-28

## 2018-11-11 DIAGNOSIS — E78.5 HYPERLIPIDEMIA LDL GOAL <100: ICD-10-CM

## 2018-11-12 NOTE — TELEPHONE ENCOUNTER
"Requested Prescriptions   Pending Prescriptions Disp Refills     fenofibrate 54 MG tablet [Pharmacy Med Name: FENOFIBRATE 54MG TABLETS]  Last Written Prescription Date:  11/11/2017  Last Fill Quantity: 90 tablet,  # refills: 3   Last office visit: 7/27/2018 with prescribing provider: TD   Future Office Visit:     90 tablet 0     Sig: TAKE 1 TABLET(54 MG) BY MOUTH DAILY    Fibrates Passed    11/11/2018  1:58 PM       Passed - Lipid panel on file in past 12 months    Recent Labs   Lab Test  07/06/18   0747   CHOL  286*   TRIG  307*   HDL  44   LDL  181*   NHDL  242*              Passed - No abnormal creatine kinase in past 12 months    No lab results found.            Passed - Recent (12 mo) or future (30 days) visit within the authorizing provider's specialty    Patient had office visit in the last 12 months or has a visit in the next 30 days with authorizing provider or within the authorizing provider's specialty.  See \"Patient Info\" tab in inbasket, or \"Choose Columns\" in Meds & Orders section of the refill encounter.             Passed - Patient is age 18 or older           "

## 2018-11-13 RX ORDER — FENOFIBRATE 54 MG/1
TABLET ORAL
Qty: 90 TABLET | Refills: 2 | Status: SHIPPED | OUTPATIENT
Start: 2018-11-13 | End: 2019-06-17

## 2018-11-13 NOTE — TELEPHONE ENCOUNTER
Prescription approved per [facility] Refill Protocol.  Leatha Chacon RN- Triage [employer]

## 2018-11-24 ENCOUNTER — TELEPHONE (OUTPATIENT)
Dept: FAMILY MEDICINE | Facility: CLINIC | Age: 58
End: 2018-11-24

## 2018-11-24 NOTE — TELEPHONE ENCOUNTER
Prior Authorization Retail Medication Request    Medication/Dose: testosterone (ANDROGEL 1.62% PUMP) 20.25 MG/ACT gel  ICD code (if different than what is on RX):    Previously Tried and Failed:    Rationale:      Insurance Name:    Insurance ID:  [id number]      Pharmacy Information (if different than what is on RX)  Name:    Phone:        NOTE: COULD NOT FIND PHARMACY ON OUR LIST. PHARMACY NAME IS [facility]. ADDRESS: [address] [place] [address]. FAX NUMBER: [phone]

## 2018-11-26 NOTE — TELEPHONE ENCOUNTER
Central Prior Authorization Team   Phone: [phone]    This is a duplicate request.  PA already submitted.  Please see previous telephone encounter.

## 2018-11-26 NOTE — TELEPHONE ENCOUNTER
Central Prior Authorization Team   Phone: [phone]    PA Initiation    Medication: testosterone (ANDROGEL 1.62% PUMP) 20.25 MG/ACT gel  Insurance Company: [employer] - Phone [phone] Fax [phone]  Pharmacy Filling the Rx: [facility] [address] [place] [address] [address] [facility] [address]  Filling Pharmacy Phone: [phone]  Filling Pharmacy Fax:    Start Date: 11/26/2018

## 2018-11-27 ENCOUNTER — MYC MEDICAL ADVICE (OUTPATIENT)
Dept: FAMILY MEDICINE | Facility: CLINIC | Age: 58
End: 2018-11-27

## 2018-11-27 NOTE — TELEPHONE ENCOUNTER
Prior Authorization Retail Medication Request    Medication/Dose: testosterone (ANDROGEL 1.62% PUMP) 20.25 MG/ACT gel  ICD code (if different than what is on RX):    Previously Tried and Failed:    Rationale:      Insurance Name:    Insurance ID:        Pharmacy Information (if different than what is on RX)  Name:    Phone:

## 2018-11-27 NOTE — TELEPHONE ENCOUNTER
Central Prior Authorization Team   Phone: [phone]    Received additional paperwork from insurance.  Filled out forms and faxed back.  Will await determination.

## 2018-11-28 NOTE — TELEPHONE ENCOUNTER
Prior Authorization Approval    Authorization Effective Date: 11/27/2018  Authorization Expiration Date: 11/27/2019  Medication: testosterone (ANDROGEL 1.62% PUMP) 20.25 MG/ACT gel  Approved Dose/Quantity:    Reference #:     Insurance Company: [employer] - Phone [phone] Fax [phone]  Expected CoPay:       CoPay Card Available:      Foundation Assistance Needed:    Which Pharmacy is filling the prescription (Not needed for infusion/clinic administered): [facility] [address] [place] [address] [address] [facility]  Pharmacy Notified: Yes  Patient Notified: Yes

## 2018-12-03 ENCOUNTER — OFFICE VISIT (OUTPATIENT)
Dept: FAMILY MEDICINE | Facility: CLINIC | Age: 58
End: 2018-12-03
Payer: COMMERCIAL

## 2018-12-03 VITALS
WEIGHT: 214.5 LBS | RESPIRATION RATE: 16 BRPM | DIASTOLIC BLOOD PRESSURE: 82 MMHG | TEMPERATURE: 98.6 F | OXYGEN SATURATION: 95 % | SYSTOLIC BLOOD PRESSURE: 130 MMHG | BODY MASS INDEX: 29.09 KG/M2 | HEART RATE: 90 BPM

## 2018-12-03 DIAGNOSIS — N52.39 OTHER POST-PROCEDURAL ERECTILE DYSFUNCTION: ICD-10-CM

## 2018-12-03 DIAGNOSIS — Z12.11 SCREEN FOR COLON CANCER: ICD-10-CM

## 2018-12-03 DIAGNOSIS — F33.41 RECURRENT MAJOR DEPRESSIVE DISORDER, IN PARTIAL REMISSION (H): ICD-10-CM

## 2018-12-03 DIAGNOSIS — Z11.4 SCREENING FOR HIV (HUMAN IMMUNODEFICIENCY VIRUS): ICD-10-CM

## 2018-12-03 DIAGNOSIS — F32.A DEPRESSION, UNSPECIFIED DEPRESSION TYPE: ICD-10-CM

## 2018-12-03 DIAGNOSIS — E29.1 HYPOGONADISM MALE: ICD-10-CM

## 2018-12-03 DIAGNOSIS — Z23 NEED FOR PROPHYLACTIC VACCINATION AND INOCULATION AGAINST INFLUENZA: ICD-10-CM

## 2018-12-03 DIAGNOSIS — Z11.59 NEED FOR HEPATITIS C SCREENING TEST: ICD-10-CM

## 2018-12-03 PROCEDURE — 99214 OFFICE O/P EST MOD 30 MIN: CPT | Performed by: FAMILY MEDICINE

## 2018-12-03 RX ORDER — DULOXETIN HYDROCHLORIDE 60 MG/1
60 CAPSULE, DELAYED RELEASE ORAL DAILY
Qty: 90 CAPSULE | Refills: 1 | Status: SHIPPED | OUTPATIENT
Start: 2018-12-03 | End: 2019-04-15

## 2018-12-03 RX ORDER — TESTOSTERONE 1.62 MG/G
2 GEL TRANSDERMAL DAILY
Qty: 75 G | Refills: 1 | Status: SHIPPED | OUTPATIENT
Start: 2018-12-03 | End: 2018-12-29

## 2018-12-03 RX ORDER — BUPROPION HYDROCHLORIDE 150 MG/1
TABLET ORAL
Qty: 90 TABLET | Refills: 1 | Status: SHIPPED | OUTPATIENT
Start: 2018-12-03 | End: 2019-06-17

## 2018-12-03 RX ORDER — SILDENAFIL CITRATE 20 MG/1
TABLET ORAL
Qty: 90 TABLET | Refills: 6 | Status: SHIPPED | OUTPATIENT
Start: 2018-12-03 | End: 2018-12-03

## 2018-12-03 RX ORDER — SILDENAFIL CITRATE 20 MG/1
TABLET ORAL
Qty: 90 TABLET | Refills: 6 | Status: SHIPPED | OUTPATIENT
Start: 2018-12-03 | End: 2018-12-19

## 2018-12-03 ASSESSMENT — ANXIETY QUESTIONNAIRES
IF YOU CHECKED OFF ANY PROBLEMS ON THIS QUESTIONNAIRE, HOW DIFFICULT HAVE THESE PROBLEMS MADE IT FOR YOU TO DO YOUR WORK, TAKE CARE OF THINGS AT HOME, OR GET ALONG WITH OTHER PEOPLE: NOT DIFFICULT AT ALL
3. WORRYING TOO MUCH ABOUT DIFFERENT THINGS: SEVERAL DAYS
2. NOT BEING ABLE TO STOP OR CONTROL WORRYING: NOT AT ALL
6. BECOMING EASILY ANNOYED OR IRRITABLE: SEVERAL DAYS
5. BEING SO RESTLESS THAT IT IS HARD TO SIT STILL: NOT AT ALL
GAD7 TOTAL SCORE: 3
7. FEELING AFRAID AS IF SOMETHING AWFUL MIGHT HAPPEN: NOT AT ALL
1. FEELING NERVOUS, ANXIOUS, OR ON EDGE: SEVERAL DAYS

## 2018-12-03 ASSESSMENT — PATIENT HEALTH QUESTIONNAIRE - PHQ9
SUM OF ALL RESPONSES TO PHQ QUESTIONS 1-9: 8
5. POOR APPETITE OR OVEREATING: NOT AT ALL

## 2018-12-03 NOTE — MR AVS SNAPSHOT
After Visit Summary   12/3/2018    Mahesh Porter    MRN: [id number]           Patient Information     Date Of Birth          1960        Visit Information        Provider Department      12/3/2018 4:00 PM Tyrone Dunn MD [facility]        Today's Diagnoses     Recurrent major depressive disorder, in partial remission (H)        Other post-procedural erectile dysfunction        Depression, unspecified depression type        Screen for colon cancer        Need for hepatitis C screening test        Screening for HIV (human immunodeficiency virus)        Need for prophylactic vaccination and inoculation against influenza        Hypogonadism male           Follow-ups after your visit        Follow-up notes from your care team     Return in about 3 months (around 3/3/2019).      Your next 10 appointments already scheduled     Aug 22, 2019  9:00 AM CDT   Return Sleep Patient with Gino Romano MD   [facility] ([facility])    [address] [address]   [phone]              Future tests that were ordered for you today     Open Future Orders        Priority Expected Expires Ordered    Hepatitis C Screen Reflex to HCV RNA Quant and Genotype Routine 12/6/2018 12/21/2018 12/3/2018    HIV Screening Routine 12/6/2018 12/21/2018 12/3/2018            Who to contact     If you have questions or need follow up information about today's clinic visit or your schedule please contact [facility] directly at [phone].  Normal or non-critical lab and imaging results will be communicated to you by MyChart, letter or phone within 4 business days after the clinic has received the results. If you do not hear from us within 7 days, please contact the clinic through MyChart or phone. If you have a critical or abnormal lab result, we will notify you by phone as soon as  possible.  Submit refill requests through Knowmia or call your pharmacy and they will forward the refill request to us. Please allow 3 business days for your refill to be completed.          Additional Information About Your Visit        [employer] Information     [employer] gives you secure access to your electronic health record. If you see a primary care provider, you can also send messages to your care team and make appointments. If you have questions, please call your primary care clinic.  If you do not have a primary care provider, please call [phone] and they will assist you.        Care EveryWhere ID     This is your Care EveryWhere ID. This could be used by other organizations to access your [place] medical records  [id number]        Your Vitals Were     Pulse Temperature Respirations Pulse Oximetry BMI (Body Mass Index)       90 98.6  F (37  C) (Tympanic) 16 95% 29.09 kg/m2        Blood Pressure from Last 3 Encounters:   12/03/18 130/82   08/22/18 135/82   07/27/18 130/74    Weight from Last 3 Encounters:   12/03/18 214 lb 8 oz (97.3 kg)   08/22/18 215 lb 9.6 oz (97.8 kg)   07/27/18 216 lb 8 oz (98.2 kg)                 Today's Medication Changes          These changes are accurate as of 12/3/18  4:33 PM.  If you have any questions, ask your nurse or doctor.               Start taking these medicines.        Dose/Directions    sildenafil 20 MG tablet   Commonly known as:  REVATIO   Used for:  Other post-procedural erectile dysfunction   Started by:  Tyrone Dunn MD        3 prior to intercourse as needed.   Quantity:  90 tablet   Refills:  6            Where to get your medicines      These medications were sent to [facility] [address] [place], [place] - [address] AT [facility] [address], [facility] [address]     Phone:  [phone]     buPROPion 150 MG 24 hr tablet    DULoxetine 60 MG capsule         Some of these will need a paper prescription and others  can be bought over the counter.  Ask your nurse if you have questions.     Bring a paper prescription for each of these medications     sildenafil 20 MG tablet    testosterone 20.25 MG/ACT gel                Primary Care Provider Office Phone # Fax #    [facility] [phone] [address] [facility] [address]        Equal Access to Services     ZEINAB LINDER : Hadii aad ku hadasho Soomaali, waaxda luqadaha, qaybta kaalmada adeegyada, waxay idiin hayaan adeeg jenagnieszkarivera laesperanza . So [facility] [phone].    ATENCIÓN: Si habla español, tiene a more disposición servicios gratuitos de asistencia lingüística. Adrienne al [phone].    We comply with applicable federal civil rights laws and [place] laws. We do not discriminate on the basis of race, color, national origin, age, disability, sex, sexual orientation, or gender identity.            Thank you!     Thank you for choosing [facility]  for your care. Our goal is always to provide you with excellent care. Hearing back from our patients is one way we can continue to improve our services. Please take a few minutes to complete the written survey that you may receive in the mail after your visit with us. Thank you!             Your Updated Medication List - Protect others around you: Learn how to safely use, store and throw away your medicines at www.disposemymeds.org.          This list is accurate as of 12/3/18  4:33 PM.  Always use your most recent med list.                   Brand Name Dispense Instructions for use Diagnosis    buPROPion 150 MG 24 hr tablet    WELLBUTRIN XL    90 tablet    TAKE 1 TABLET(150 MG) BY MOUTH EVERY MORNING    Depression, unspecified depression type       DHA-EPA-VITAMIN E PO      Take 1 capsule by mouth        DULoxetine 60 MG capsule    CYMBALTA    90 capsule    Take 1 capsule (60 mg) by mouth daily    Recurrent major depressive disorder, in partial  remission (H)       fenofibrate 54 MG tablet    LOFIBRA    90 tablet    TAKE 1 TABLET(54 MG) BY MOUTH DAILY    Hyperlipidemia LDL goal <100       lisinopril 5 MG tablet    PRINIVIL/ZESTRIL    90 tablet    Take 1 tablet (5 mg) by mouth daily    Essential hypertension, benign       MULTI-VITAMINS Tabs      Take 1 tablet by mouth        PROBIOTIC & ACIDOPHILUS EX ST Caps      Take 1 capsule by mouth        rosuvastatin 5 MG tablet    CRESTOR    90 tablet    Take 1 tablet (5 mg) by mouth daily    Hypercholesterolemia       sildenafil 20 MG tablet    REVATIO    90 tablet    3 prior to intercourse as needed.    Other post-procedural erectile dysfunction       testosterone 20.25 MG/ACT gel    ANDROGEL 1.62% PUMP    75 g    Place 2 Pump onto the skin daily Apply from dispenser to clean, dry, intact skin of the upper arms and shoulders.    Hypogonadism male

## 2018-12-03 NOTE — PROGRESS NOTES
SUBJECTIVE:   Mahesh Porter is a 58 year old male who presents to clinic today for the following health issues:    Hyperlipidemia Follow-Up      Rate your low fat/cholesterol diet?: fair    Taking statin?  Yes, no muscle aches from statin    Other lipid medications/supplements?:  Fenofibrate, without side effects    He has been taking the Crestor 5 mg daily without side effects    Hypertension Follow-up      Outpatient blood pressures are not being checked.    Low Salt Diet: not monitoring salt    Depression Followup    Status since last visit: Stable     See PHQ-9 for current symptoms.  Other associated symptoms: None    Complicating factors:   Significant life event:  Yes- might quit his job and purchase a boat. States he needs a break   Current substance abuse:  None  Anxiety or Panic symptoms:  No    PHQ 7/26/2017 6/29/2018 12/3/2018   PHQ-9 Total Score 5 5 8   Q9: Suicide Ideation Not at all Not at all Not at all     In the past two weeks have you had thoughts of suicide or self-harm?  No.    Do you have concerns about your personal safety or the safety of others?   No  PHQ-9  English  PHQ-9   Any Language  Suicide Assessment Five-step Evaluation and Treatment (SAFE-T)    Acute Illness   Acute illness concerns: sore throat  Onset: 2 weeks    Severity: severe    Accompanying signs and symptoms: chest congestion, clearing throat, hoarse voice    History (predisposing factors):  none      Fever: no     Chills/Sweats: no     Headache (location?): YES    Sinus Pressure:no    Conjunctivitis:  no    Ear Pain: no    Rhinorrhea: no     Congestion: no     Sore Throat: YES     Cough: YES-non-productive    Wheeze: no     Decreased Appetite: no     Nausea: no     Vomiting: no     Diarrhea:  YES     Dysuria/Freq.: no     Fatigue/Achiness: YES    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  Additional history: as documented    Labs reviewed in EPIC    Reviewed and updated as needed this visit by clinical staff  Tobacco  Allergies  Meds  Problems  Med Hx  Surg Hx  Fam Hx  Soc Hx        Reviewed and updated as needed this visit by Provider  Allergies  Meds  Problems         ROS:  CONSTITUTIONAL:NEGATIVE for fever, chills, change in weight  ENT/MOUTH: POSITIVE for nasal congestion, postnasal drainage, rhinorrhea-clear and sore throat  RESP:POSITIVE for cough-non productive  CV: NEGATIVE for chest pain, palpitations or peripheral edema  MUSCULOSKELETAL: NEGATIVE for significant arthralgias or myalgia  PSYCHIATRIC: POSITIVE fordepressed mood and Hx depression    OBJECTIVE:                                                    /82 (BP Location: Left arm, Patient Position: Sitting, Cuff Size: Adult Large)  Pulse 90  Temp 98.6  F (37  C) (Tympanic)  Resp 16  Wt 214 lb 8 oz (97.3 kg)  SpO2 95%  BMI 29.09 kg/m2  Body mass index is 29.09 kg/(m^2).  GENERAL APPEARANCE: healthy, alert and no distress  HENT: ear canals and TM's normal, nose and mouth without ulcers or lesions, nasal mucosa edematous without rhinorrhea, rhinorrhea clear, oral mucous membranes moist and oropharynx clear  NECK: no adenopathy, no asymmetry, masses, or scars and thyroid normal to palpation  RESP: lungs clear to auscultation - no rales, rhonchi or wheezes  CV: regular rates and rhythm, normal S1 S2, no S3 or S4 and no murmur, click or rub  MS: extremities normal- no gross deformities noted  PSYCH: mentation appears normal and affect normal/bright    Diagnostic test results:  Lab: future orders pending     ASSESSMENT/PLAN:                                                        ICD-10-CM    1. Recurrent major depressive disorder, in partial remission (H) F33.41 DULoxetine (CYMBALTA) 60 MG capsule   2. Other post-procedural erectile dysfunction N52.39 sildenafil (REVATIO) 20 MG  tablet     DISCONTINUED: sildenafil (REVATIO) 20 MG tablet   3. Depression, unspecified depression type F32.9 buPROPion (WELLBUTRIN XL) 150 MG 24 hr tablet   4. Screen for colon cancer Z12.11    5. Need for hepatitis C screening test Z11.59 Hepatitis C Screen Reflex to HCV RNA Quant and Genotype   6. Screening for HIV (human immunodeficiency virus) Z11.4 HIV Screening   7. Need for prophylactic vaccination and inoculation against influenza Z23    8. Hypogonadism male E29.1 testosterone (ANDROGEL 1.62% PUMP) 20.25 MG/ACT gel       Follow up with Provider - 3 mo    Patient Instructions   Continue with Crestor and Fenofibrate      Tyrone Dunn MD  [facility]

## 2018-12-04 ASSESSMENT — ANXIETY QUESTIONNAIRES: GAD7 TOTAL SCORE: 3

## 2018-12-05 ENCOUNTER — TELEPHONE (OUTPATIENT)
Dept: FAMILY MEDICINE | Facility: CLINIC | Age: 58
End: 2018-12-05

## 2018-12-05 DIAGNOSIS — N52.39 OTHER POST-PROCEDURAL ERECTILE DYSFUNCTION: ICD-10-CM

## 2018-12-05 DIAGNOSIS — F32.A DEPRESSION, UNSPECIFIED DEPRESSION TYPE: ICD-10-CM

## 2018-12-05 NOTE — TELEPHONE ENCOUNTER
buPROPion (WELLBUTRIN XL) 150 MG 24 hr tablet is currently on back order for the remainder of the year.  If appropriate please fax a new rx to the pharmacy

## 2018-12-05 NOTE — TELEPHONE ENCOUNTER
Prior Authorization Retail Medication Request    Medication/Dose: sildenafil (REVATIO) 20 MG tablet  ICD code (if different than what is on RX):     Previously Tried and Failed:     Rationale:       Insurance Name:   [employer] scripts   Insurance ID:   [id number]        Pharmacy Information (if different than what is on RX)  [facility]  Phone:  [phone]

## 2018-12-06 RX ORDER — BUPROPION HYDROCHLORIDE 75 MG/1
75 TABLET ORAL 2 TIMES DAILY
Qty: 60 TABLET | Refills: 1 | Status: SHIPPED | OUTPATIENT
Start: 2018-12-06 | End: 2019-04-15

## 2018-12-06 NOTE — TELEPHONE ENCOUNTER
Central Prior Authorization Team   Phone: [phone]      PA Initiation    Medication: sildenafil (REVATIO) 20 MG tablet-Initiated  Insurance Company: [employer] - Phone [phone] Fax [phone]  Pharmacy Filling the Rx: [facility] [address] [place] [address] [address] AT [facility] OF [address]  Filling Pharmacy Phone: [phone]  Filling Pharmacy Fax:    Start Date: 12/6/2018

## 2018-12-06 NOTE — TELEPHONE ENCOUNTER
Will change to the Bupropion 75 mg twice daily tablet for this month or until the other pill is available again.  Rx sent to his pharmacy.  Notify Pt

## 2018-12-07 NOTE — TELEPHONE ENCOUNTER
Patient Contact    Attempt # 2    Was call answered?  No.  Left message on voicemail with information to call me back.

## 2018-12-10 NOTE — TELEPHONE ENCOUNTER
Patient Contact    Attempt # 3    Was call answered?  No.  Left message on voicemail with information to call me back.    Closing encounter due to multiple failed attempts to reach patient.

## 2018-12-12 NOTE — TELEPHONE ENCOUNTER
Called and spoke with Jaleel at [facility] to check on the status of PA, it was denied.  Asked to have denial fax sent over.

## 2018-12-14 DIAGNOSIS — Z11.4 SCREENING FOR HIV (HUMAN IMMUNODEFICIENCY VIRUS): ICD-10-CM

## 2018-12-14 DIAGNOSIS — Z11.59 NEED FOR HEPATITIS C SCREENING TEST: ICD-10-CM

## 2018-12-14 DIAGNOSIS — E78.00 HYPERCHOLESTEROLEMIA: ICD-10-CM

## 2018-12-14 LAB
ALT SERPL W P-5'-P-CCNC: 37 U/L (ref 0–70)
AST SERPL W P-5'-P-CCNC: 25 U/L (ref 0–45)
CHOLEST SERPL-MCNC: 229 MG/DL
HCV AB SERPL QL IA: NONREACTIVE
HDLC SERPL-MCNC: 53 MG/DL
HIV 1+2 AB+HIV1 P24 AG SERPL QL IA: NONREACTIVE
LDLC SERPL CALC-MCNC: 128 MG/DL
NONHDLC SERPL-MCNC: 176 MG/DL
TRIGL SERPL-MCNC: 240 MG/DL

## 2018-12-14 PROCEDURE — 36415 COLL VENOUS BLD VENIPUNCTURE: CPT | Performed by: FAMILY MEDICINE

## 2018-12-14 PROCEDURE — 84460 ALANINE AMINO (ALT) (SGPT): CPT | Performed by: FAMILY MEDICINE

## 2018-12-14 PROCEDURE — 86803 HEPATITIS C AB TEST: CPT | Performed by: FAMILY MEDICINE

## 2018-12-14 PROCEDURE — 84450 TRANSFERASE (AST) (SGOT): CPT | Performed by: FAMILY MEDICINE

## 2018-12-14 PROCEDURE — 80061 LIPID PANEL: CPT | Performed by: FAMILY MEDICINE

## 2018-12-14 PROCEDURE — 87389 HIV-1 AG W/HIV-1&-2 AB AG IA: CPT | Performed by: FAMILY MEDICINE

## 2018-12-17 NOTE — RESULT ENCOUNTER NOTE
Link Wilkes,    I just wanted to let you know that your lab results have been reviewed and are attached.    - Tyrone Dunn MD is currently out of the office.  Your results are normal except for high cholesterol.  You may want to talk to Dr. Dunn about increasing your crestor dose at your next appointment.    Please let me know if you have any questions and have a great week!    Sincerely,    Saima Chung PA-C    [facility]  [address]  [place] [address]  [phone] (p)

## 2018-12-17 NOTE — TELEPHONE ENCOUNTER
Spoke with El at [facility] to get a denial sent over since one has not been received.

## 2018-12-19 RX ORDER — SILDENAFIL CITRATE 20 MG/1
TABLET ORAL
Qty: 18 TABLET | Refills: 3 | Status: SHIPPED | OUTPATIENT
Start: 2018-12-19 | End: 2019-06-17

## 2018-12-19 NOTE — TELEPHONE ENCOUNTER
Prior Authorization Approval-Partial    Authorization Effective Date:    Authorization Expiration Date:    Medication: sildenafil (REVATIO) 20 MG tablet-PARTIALLY APPROVED  Approved Dose/Quantity:   Reference #:     Insurance Company: [employer] - Phone [phone] Fax [phone]  Expected CoPay:       CoPay Card Available:      Foundation Assistance Needed:    Which Pharmacy is filling the prescription (Not needed for infusion/clinic administered[facility] [address] [place] [address] [address] AT [facility] [address] [facility]  Pharmacy Notified: Yes  Patient Notified: No    Pharmacy will notify patient when medication is ready. Insurance will cover up to 6 tablets per month for the diagnosis of ED.

## 2018-12-19 NOTE — TELEPHONE ENCOUNTER
Per PA team: Pharmacy called back and they do not have a prescription on file and is requesting another one be sent.

## 2018-12-19 NOTE — TELEPHONE ENCOUNTER
"Requested Prescriptions   Pending Prescriptions Disp Refills     sildenafil (REVATIO) 20 MG tablet 90 tablet 6    Last Written Prescription Date:  12/3/2018 (pharmacy said they never received)  Last Fill Quantity: 90,  # refills: 6   Last office visit: 12/3/2018 with prescribing provider:  Mona   Future Office Visit:     Sig: 3 prior to intercourse as needed.    Erectile Dysfuction Protocol Passed - 12/19/2018  8:19 AM       Passed - Absence of nitrates on medication list       Passed - Absence of Alpha Blockers on Med list       Passed - Recent (12 mo) or future (30 days) visit within the authorizing provider's specialty    Patient had office visit in the last 12 months or has a visit in the next 30 days with authorizing provider or within the authorizing provider's specialty.  See \"Patient Info\" tab in inbasket, or \"Choose Columns\" in Meds & Orders section of the refill encounter.             Passed - Patient is age 18 or older          "

## 2018-12-29 DIAGNOSIS — E29.1 HYPOGONADISM MALE: ICD-10-CM

## 2018-12-29 NOTE — TELEPHONE ENCOUNTER
Requested Prescriptions   Pending Prescriptions Disp Refills     ANDROGEL 1.62% PUMP 20.25 MG/ACT gel [Pharmacy Med Name: ANDROGEL 1.62% (20.25MG/1.25GM)PUMP]  Last Written Prescription Date:  12/03/2018  Last Fill Quantity: 75g,  # refills: 1   Last office visit: 12/3/2018 with prescribing provider:  TD   Future Office Visit:      0     Sig: PLACE 2 PUMPS ONTO THE SKIN DAILY. APPLY FROM DISPENSER TO CLEAN, DRY, INTACT SKIN OF UPPER ARMS AND SHOULDERS    Androgen Agents Failed - 12/29/2018 10:06 AM       Failed - Refills for this classification require provider review       Passed - Patient is of age 12 and older       Passed - Lipid panel on file in past 12 mos    Recent Labs   Lab Test 12/14/18  0807   CHOL 229*   TRIG 240*   HDL 53   *   NHDL 176*              Passed - ALT on file within past 12 mos    Recent Labs   Lab Test 12/14/18  0807   ALT 37            Passed - Medication is active on med list       Passed - HCT less than 54% on file within past 12 mos    Recent Labs   Lab Test 07/06/18  0747   HCT 43.2            Passed - Serum Testosterone on file within past 12 mos    Recent Labs   Lab Test 07/06/18  0747   TESTOSTTOTAL 725            Passed - Serum PSA on file within past 12 mos    Lab Results   Component Value Date    PSA 2.81 07/06/2018            Passed - Blood pressure under 140/90 in past 6 months    BP Readings from Last 3 Encounters:   12/03/18 130/82   08/22/18 135/82   07/27/18 130/74                Passed - Patient is not pregnant       Passed - No positive pregnancy test on file within past 12 mos       Passed - Recent (6 mo) or future (30 days) visit within the authorizing provider's specialty       Passed - AST on file within past 12 mos    Recent Labs   Lab Test 12/14/18  0807   AST 25

## 2019-01-03 ENCOUNTER — TELEPHONE (OUTPATIENT)
Dept: FAMILY MEDICINE | Facility: CLINIC | Age: 59
End: 2019-01-03

## 2019-01-03 RX ORDER — TESTOSTERONE 16.2 MG/G
GEL TRANSDERMAL
Qty: 75 G | Refills: 1 | Status: SHIPPED | OUTPATIENT
Start: 2019-01-03 | End: 2019-04-15

## 2019-01-03 NOTE — TELEPHONE ENCOUNTER
Prior Authorization Retail Medication Request    Medication/Dose: Testosterone 20.25 MG/ACT(1.62%) gel  ICD code (if different than what is on RX):    Previously Tried and Failed:    Rationale:      Insurance Name:    Insurance ID:        Pharmacy Information (if different than what is on RX)  Name:    Phone:      PA started on CMM. Key: [id number]

## 2019-01-07 NOTE — TELEPHONE ENCOUNTER
Central Prior Authorization Team   Phone: [phone]      PA Initiation    Medication: Testosterone 20.25 MG/ACT(1.62%) gel  Insurance Company: [employer] - Phone [phone] Fax [phone]  Pharmacy Filling the Rx: [facility] [id number] [place] [address] [address] AT [facility] OF [address]  Filling Pharmacy Phone: [phone]  Filling Pharmacy Fax:    Start Date: 1/7/2019

## 2019-01-10 NOTE — TELEPHONE ENCOUNTER
Prior Authorization Not Needed per Insurance    Medication: Testosterone 20.25 MG/ACT(1.62%) gel  Insurance Company: [employer] - Phone [phone] Fax [phone]  Expected CoPay:      Pharmacy Filling the Rx: [facility] [address] [place] [address] [address] AT [facility] OF [address] [facility]  Pharmacy Notified: Yes  Patient Notified: Yes      I talked to insurance and they stated a P/A is not needed. Patient picked this up on 12/31/2018.

## 2019-04-15 ENCOUNTER — OFFICE VISIT (OUTPATIENT)
Dept: FAMILY MEDICINE | Facility: CLINIC | Age: 59
End: 2019-04-15
Payer: MEDICAID

## 2019-04-15 VITALS
OXYGEN SATURATION: 97 % | TEMPERATURE: 97.1 F | HEIGHT: 71 IN | HEART RATE: 98 BPM | SYSTOLIC BLOOD PRESSURE: 118 MMHG | WEIGHT: 209 LBS | RESPIRATION RATE: 16 BRPM | BODY MASS INDEX: 29.26 KG/M2 | DIASTOLIC BLOOD PRESSURE: 80 MMHG

## 2019-04-15 DIAGNOSIS — F33.41 RECURRENT MAJOR DEPRESSIVE DISORDER, IN PARTIAL REMISSION (H): Primary | ICD-10-CM

## 2019-04-15 DIAGNOSIS — I10 ESSENTIAL HYPERTENSION, BENIGN: ICD-10-CM

## 2019-04-15 DIAGNOSIS — E78.00 HYPERCHOLESTEROLEMIA: ICD-10-CM

## 2019-04-15 DIAGNOSIS — E29.1 HYPOGONADISM MALE: ICD-10-CM

## 2019-04-15 DIAGNOSIS — R25.1 TREMOR: ICD-10-CM

## 2019-04-15 PROCEDURE — 36415 COLL VENOUS BLD VENIPUNCTURE: CPT | Performed by: FAMILY MEDICINE

## 2019-04-15 PROCEDURE — 99214 OFFICE O/P EST MOD 30 MIN: CPT | Performed by: FAMILY MEDICINE

## 2019-04-15 PROCEDURE — 84403 ASSAY OF TOTAL TESTOSTERONE: CPT | Performed by: FAMILY MEDICINE

## 2019-04-15 PROCEDURE — 84460 ALANINE AMINO (ALT) (SGPT): CPT | Performed by: FAMILY MEDICINE

## 2019-04-15 PROCEDURE — 80048 BASIC METABOLIC PNL TOTAL CA: CPT | Performed by: FAMILY MEDICINE

## 2019-04-15 PROCEDURE — 80061 LIPID PANEL: CPT | Performed by: FAMILY MEDICINE

## 2019-04-15 PROCEDURE — 84450 TRANSFERASE (AST) (SGOT): CPT | Performed by: FAMILY MEDICINE

## 2019-04-15 RX ORDER — ROSUVASTATIN CALCIUM 5 MG/1
5 TABLET, COATED ORAL DAILY
Qty: 90 TABLET | Refills: 1 | Status: SHIPPED | OUTPATIENT
Start: 2019-04-15 | End: 2019-06-17

## 2019-04-15 RX ORDER — TESTOSTERONE 1.62 MG/G
GEL TRANSDERMAL
Qty: 75 G | Refills: 1 | Status: SHIPPED | OUTPATIENT
Start: 2019-04-15 | End: 2019-04-24

## 2019-04-15 RX ORDER — DULOXETIN HYDROCHLORIDE 60 MG/1
60 CAPSULE, DELAYED RELEASE ORAL DAILY
Qty: 90 CAPSULE | Refills: 1 | Status: SHIPPED | OUTPATIENT
Start: 2019-04-15 | End: 2020-01-13 | Stop reason: ALTCHOICE

## 2019-04-15 ASSESSMENT — MIFFLIN-ST. JEOR: SCORE: 1791.18

## 2019-04-15 NOTE — PROGRESS NOTES
SUBJECTIVE:   Mahesh Porter is a 58 year old male who presents to clinic today for the following   health issues:    Additional: 1. head and hand tremors. Scheduled to see Neurology 5/15/19.   He is scheduled to see Dr Arce at [facility]  He has Hx of REM Behavior disorder.  He has fine tremor in both hands  Worried about possibility of connection with Parkinson's disorder    2. Testosterone check.    Medication Followup of Bupropion    Taking Medication as prescribed: yes    Side Effects:  None    Medication Helping Symptoms:  NO       Hyperlipidemia Follow-Up      Rate your low fat/cholesterol diet?: fair    Taking statin?  Yes, no muscle aches from statin    Other lipid medications/supplements?:  Fenofibrate, without side effects    Hypertension Follow-up      Outpatient blood pressures are being checked at store.  Results are 130-135/80-90.    Low Salt Diet: not monitoring salt    Depression Followup    Status since last visit: Worsened     See PHQ-9 for current symptoms.  Other associated symptoms: None    Complicating factors:   Significant life event:  Yes- purchasing boat in [place], quit job-needed a break, relationship problems   Current substance abuse:  None  Anxiety or Panic symptoms:  No    PHQ 7/26/2017 6/29/2018 12/3/2018   PHQ-9 Total Score 5 5 8   Q9: Thoughts of better off dead/self-harm past 2 weeks Not at all Not at all Not at all     In the past two weeks have you had thoughts of suicide or self-harm?  No.    Do you have concerns about your personal safety or the safety of others?   No  PHQ-9  English  PHQ-9   Any Language  Suicide Assessment Five-step Evaluation and Treatment (SAFE-T)    Pt has been on medications for years, he feels that the medications are not controlling symptoms as well has they had in past  He reports that every few years the medications have had to be adjusted      He is using the Androgel regularly, does not miss any doses            Additional  "history: as documented    Reviewed  and updated as needed this visit by clinical staff  Tobacco  Allergies  Meds  Problems  Med Hx  Surg Hx  Fam Hx  Soc Hx          Reviewed and updated as needed this visit by Provider  Tobacco  Allergies  Meds  Problems  Med Hx  Surg Hx  Fam Hx         Labs reviewed in EPIC    ROS:  CONSTITUTIONAL:NEGATIVE for fever, chills, change in weight  ENT/MOUTH: NEGATIVE for ear, mouth and throat problems  RESP:NEGATIVE for significant cough or SOB  CV: NEGATIVE for chest pain, palpitations or peripheral edema  NEURO: POSITIVE for tremor fine resting tremor  PSYCHIATRIC: POSITIVE foranxiety, depressed mood, Hx anxiety and Hx depression    OBJECTIVE:                                                    /80 (BP Location: Left arm, Patient Position: Sitting, Cuff Size: Adult Large)   Pulse 98   Temp 97.1  F (36.2  C) (Tympanic)   Resp 16   Ht 1.797 m (5' 10.75\")   Wt 94.8 kg (209 lb)   SpO2 97%   BMI 29.36 kg/m    Body mass index is 29.36 kg/m .  GENERAL APPEARANCE: healthy, alert and no distress  RESP: lungs clear to auscultation - no rales, rhonchi or wheezes  CV: regular rates and rhythm, normal S1 S2, no S3 or S4 and no murmur, click or rub  MS: extremities normal- no gross deformities noted  NEURO: Normal strength and tone, mentation intact, speech normal and tremor slight fine tremor in both upper extremities  PSYCH: mentation appears normal, affect flat, anxious and worried    Diagnostic test results:  Lab: see below, results pending     ASSESSMENT/PLAN:                                                        ICD-10-CM    1. Recurrent major depressive disorder, in partial remission (H) F33.41 MENTAL HEALTH REFERRAL  - Adult; Psychiatry and Medication Management; Psychiatry; [facility]: Collaborative Care Psychiatry Service [phone].  Medication management & future refills will be returned to [facility] PCP upon completion of evaluation; We alaina...     DULoxetine " (CYMBALTA) 60 MG capsule   2. Essential hypertension, benign I10 Basic metabolic panel   3. Hypercholesterolemia E78.00 Lipid panel reflex to direct LDL Fasting     ALT     AST     rosuvastatin (CRESTOR) 5 MG tablet   4. Hypogonadism male E29.1 Testosterone total     testosterone (ANDROGEL 1.62% PUMP) 20.25 MG/ACT gel   5. Tremor R25.1        Follow up with Provider - 3 mo   Refer to Mental Health psychiatry collaborative care for consult to at least make recommentation about what medication change should be considered   Patient Instructions   Keep appt with neurology, have report sent here      Tyrone Dunn MD  [facility]

## 2019-04-16 ENCOUNTER — TELEPHONE (OUTPATIENT)
Dept: FAMILY MEDICINE | Facility: CLINIC | Age: 59
End: 2019-04-16

## 2019-04-16 DIAGNOSIS — E78.5 HYPERLIPIDEMIA LDL GOAL <100: ICD-10-CM

## 2019-04-16 LAB
ALT SERPL W P-5'-P-CCNC: 32 U/L (ref 0–70)
ANION GAP SERPL CALCULATED.3IONS-SCNC: 6 MMOL/L (ref 3–14)
AST SERPL W P-5'-P-CCNC: 22 U/L (ref 0–45)
BUN SERPL-MCNC: 24 MG/DL (ref 7–30)
CALCIUM SERPL-MCNC: 9.2 MG/DL (ref 8.5–10.1)
CHLORIDE SERPL-SCNC: 104 MMOL/L (ref 94–109)
CHOLEST SERPL-MCNC: 269 MG/DL
CO2 SERPL-SCNC: 28 MMOL/L (ref 20–32)
CREAT SERPL-MCNC: 1.28 MG/DL (ref 0.66–1.25)
GFR SERPL CREATININE-BSD FRML MDRD: 61 ML/MIN/{1.73_M2}
GLUCOSE SERPL-MCNC: 111 MG/DL (ref 70–99)
HDLC SERPL-MCNC: 56 MG/DL
LDLC SERPL CALC-MCNC: 172 MG/DL
NONHDLC SERPL-MCNC: 213 MG/DL
POTASSIUM SERPL-SCNC: 4.5 MMOL/L (ref 3.4–5.3)
SODIUM SERPL-SCNC: 138 MMOL/L (ref 133–144)
TRIGL SERPL-MCNC: 206 MG/DL

## 2019-04-16 NOTE — TELEPHONE ENCOUNTER
Prior Authorization Retail Medication Request    Medication/Dose:   ICD code (if different than what is on RX):      Previously Tried and Failed:     Rationale:       Insurance Name:     Insurance ID:         Pharmacy Information (if different than what is on RX)  Name:  [facility]   Phone:  [phone]    [facility]: [id number]

## 2019-04-17 LAB — TESTOST SERPL-MCNC: 193 NG/DL (ref 240–950)

## 2019-04-18 ENCOUNTER — TELEPHONE (OUTPATIENT)
Dept: FAMILY MEDICINE | Facility: CLINIC | Age: 59
End: 2019-04-18

## 2019-04-18 DIAGNOSIS — E29.1 HYPOGONADISM MALE: Primary | ICD-10-CM

## 2019-04-18 NOTE — TELEPHONE ENCOUNTER
Submitted P/A request for generic which they denied because their preferred is name brand Androgel. Will resubmit for name brand Androgel.

## 2019-04-18 NOTE — TELEPHONE ENCOUNTER
PA Initiation    Medication: testosterone (ANDROGEL 1.62% PUMP) 20.25 MG/ACT gel  Insurance Company: [place] Medicaid ([facility]) - Phone [phone] Fax [phone]  Pharmacy Filling the Rx: [facility] [address] [place] [address] [address] AT [facility] OF [address]  Filling Pharmacy Phone: [phone]  Filling Pharmacy Fax:    Start Date: 4/18/2019

## 2019-04-19 NOTE — TELEPHONE ENCOUNTER
PRIOR AUTHORIZATION DENIED    Medication: testosterone (ANDROGEL 1.62% PUMP) 20.25 MG/ACT gel- P/A DENIED    Denial Date: 4/18/2019    Denial Rational:         Appeal Information: HID# [id number]

## 2019-04-19 NOTE — TELEPHONE ENCOUNTER
Looks like PRIETO sigala is requiring that he have 2 low testosterone lab tests.  He will need to be off of the Testosterone replacement and then come in for lab test.  We will need to do a 2nd test a week or 2 later   Future lab order placed for the 1st test

## 2019-04-22 ENCOUNTER — MYC MEDICAL ADVICE (OUTPATIENT)
Dept: FAMILY MEDICINE | Facility: CLINIC | Age: 59
End: 2019-04-22

## 2019-04-22 DIAGNOSIS — E29.1 HYPOGONADISM MALE: ICD-10-CM

## 2019-04-22 NOTE — TELEPHONE ENCOUNTER
PA Initiation    Medication: fenofibrate 54 MG tablet  Insurance Company: [place] Medicaid ([facility]) - Phone [phone] Fax [phone]  Pharmacy Filling the Rx: [id number] [place], [place] - [address]  Filling Pharmacy Phone: [phone]  Filling Pharmacy Fax:    Start Date: 4/22/2019    Central Prior Authorization Team   Phone: [phone]

## 2019-04-23 NOTE — TELEPHONE ENCOUNTER
If you would like to appeal, please write a letter of necessity and route the this entire encounter to [facility] PA MED pool for them to complete the appeal.   If you would like to change the medication or have pt pay out of pocket, please send to the patient care team so they can call pt to notify them.    PLEASE USE DEPT LOGO LETTER OF MEDICAL NECESSITY [id number]      The patient must first try and fail the following:     Tricor 48 mg or 145 mg     Trilipix 45 mg or 135 mg

## 2019-04-23 NOTE — TELEPHONE ENCOUNTER
Called pharmacy this AM. States they will call back with the reason for rejection on this medication and what needs to be done for the patient.

## 2019-04-23 NOTE — TELEPHONE ENCOUNTER
Shirley returned a call. They had just spoken with a nurse and are returning a call with information.    In regards to the testosterone pump.    Because he has straight MA, it requires a new prescription. MA covers brand Pranay Gel, and it needs a DAW1 on the prescription.    After the new prescription is submitted, it will need a prior authorization.

## 2019-04-23 NOTE — TELEPHONE ENCOUNTER
PRIOR AUTHORIZATION DENIED    Medication: fenofibrate 54 MG tablet - DENIED     Denial Date: 4/22/2019    Denial Rational: The patient must first try and fail the following:     Tricor 48 mg or 145 mg     Trilipix 45 mg or 135 mg         Appeal Information: Please provide a letter of medical necessity

## 2019-04-24 RX ORDER — FENOFIBRATE 145 MG/1
145 TABLET, COATED ORAL DAILY
Qty: 90 TABLET | Refills: 1 | Status: SHIPPED | OUTPATIENT
Start: 2019-04-24 | End: 2020-04-14

## 2019-04-24 RX ORDER — TESTOSTERONE 1.62 MG/G
GEL TRANSDERMAL
Qty: 75 G | Refills: 1 | Status: SHIPPED | OUTPATIENT
Start: 2019-04-24 | End: 2019-05-24 | Stop reason: ALTCHOICE

## 2019-04-24 NOTE — TELEPHONE ENCOUNTER
Rx pended for Pranay Gel with directions to fill DAW1. Please sign if agree with order. Then, we can send message  to PA team to start a PA.

## 2019-04-24 NOTE — TELEPHONE ENCOUNTER
I can do Rx and have it sent marked to fill BETTY  It is still most likely that it will be denied.  And he will need to do one more test of testosterone level to demonstrate it is low before they would approve PA

## 2019-04-30 ENCOUNTER — OFFICE VISIT (OUTPATIENT)
Dept: FAMILY MEDICINE | Facility: CLINIC | Age: 59
End: 2019-04-30
Payer: MEDICAID

## 2019-04-30 VITALS
HEIGHT: 71 IN | RESPIRATION RATE: 20 BRPM | SYSTOLIC BLOOD PRESSURE: 130 MMHG | DIASTOLIC BLOOD PRESSURE: 64 MMHG | HEART RATE: 106 BPM | WEIGHT: 210 LBS | TEMPERATURE: 98 F | BODY MASS INDEX: 29.4 KG/M2 | OXYGEN SATURATION: 97 %

## 2019-04-30 DIAGNOSIS — C62.90 MALIGNANT NEOPLASM OF TESTIS, UNSPECIFIED LATERALITY, UNSPECIFIED WHETHER DESCENDED OR UNDESCENDED: ICD-10-CM

## 2019-04-30 DIAGNOSIS — Z20.818 EXPOSURE TO STREP THROAT: ICD-10-CM

## 2019-04-30 DIAGNOSIS — R09.82 POST-NASAL DRIP: ICD-10-CM

## 2019-04-30 DIAGNOSIS — E66.3 OVERWEIGHT (BMI 25.0-29.9): ICD-10-CM

## 2019-04-30 DIAGNOSIS — J01.90 ACUTE NON-RECURRENT SINUSITIS, UNSPECIFIED LOCATION: ICD-10-CM

## 2019-04-30 DIAGNOSIS — R07.0 THROAT PAIN: Primary | ICD-10-CM

## 2019-04-30 LAB
DEPRECATED S PYO AG THROAT QL EIA: NORMAL
SPECIMEN SOURCE: NORMAL

## 2019-04-30 PROCEDURE — 99214 OFFICE O/P EST MOD 30 MIN: CPT | Performed by: FAMILY MEDICINE

## 2019-04-30 PROCEDURE — 87880 STREP A ASSAY W/OPTIC: CPT | Performed by: FAMILY MEDICINE

## 2019-04-30 PROCEDURE — 87081 CULTURE SCREEN ONLY: CPT | Performed by: FAMILY MEDICINE

## 2019-04-30 RX ORDER — SULFAMETHOXAZOLE/TRIMETHOPRIM 800-160 MG
1 TABLET ORAL 2 TIMES DAILY
Qty: 20 TABLET | Refills: 0 | Status: SHIPPED | OUTPATIENT
Start: 2019-04-30 | End: 2019-06-12

## 2019-04-30 ASSESSMENT — MIFFLIN-ST. JEOR: SCORE: 1795.71

## 2019-04-30 NOTE — PATIENT INSTRUCTIONS
1. Shingrex is a 2 shot series that prevents shingles 97% of the time, as opposed to the old shingles shot that only prevented it at 40-50%  It costs less for medicare at a pharmacy  You should get it starting at 50 yrs old get the 2nd shot 5-6 mo after the first one    2. Run a cold air vaporizer as much as possible. If you cannot,  boil water and breath the warm vapors 2-3 times a day to try to open up the sinuses take 2400mgm of guaifenesin per 24 hours   You can do this by taking  Mucinex plain blue  1200 mg  One tablet twice a day (This may come as 600mg/tablet and you need to take 2 tabs twice a day) or you could buy the cheaper  generic 400mgm / tab and take 2 tablets 3 x a day or 1 and 1/2 tablets 4 x a day . .Guaifenesin is  the major component of most cough syrups, because it makes the mucus less thick, and therefore it drains out better and you are less likely to cough from it dripping on the back of your throat.  Irrigate the  nose with plain water under the kitchen sink faucet or the shower.  Cindy pots, spray bottles, etc accumulate bacteria and are not recommended.   The tickle in the throat is also helped by gargling with vinegar and honey mixture, or pop or mouth wash as these coat the throat.  Please try to rinse teeth with water after using these .   Do not use sudafed or pseudephedrine as it dries the mucus up so it is harder to get it out, and it can raise your BP

## 2019-04-30 NOTE — LETTER
May 2, 2019      Mahesh RIVERA [place]  [address]  [facility] [address]        Dear ,    We are writing to inform you of your test results.        Your 2 day strep culture was negative!      Resulted Orders   Strep, Rapid Screen   Result Value Ref Range    Specimen Description Throat     Rapid Strep A Screen       NEGATIVE: No Group A streptococcal antigen detected by immunoassay, await culture report.   Beta strep group A culture   Result Value Ref Range    Specimen Description Throat     Culture Micro No beta hemolytic Streptococcus Group A isolated        If you have any questions or concerns, please call the clinic at the number listed above.       Sincerely,        Aurora Marcos MD

## 2019-04-30 NOTE — PROGRESS NOTES
SUBJECTIVE:   Mahesh Porter is a 58 year old male who presents to clinic today for the following   health issues:    ENT Symptoms             Symptoms: cc Present Absent Comment   Fever/Chills   x    Fatigue  x     Muscle Aches  x     Eye Irritation   x    Sneezing   x    Nasal Emmanuel/Drg  x     Sinus Pressure/Pain  x     Loss of smell   x    Dental pain   x    Sore Throat  x     Swollen Glands  x     Ear Pain/Fullness   x    Cough  x     Wheeze   x    Chest Pain   x    Shortness of breath   x    Rash   x    Other   x      Symptom duration:  x 1 week   Symptom severity:  Moderate   Treatments tried:  OTC Decongestant   Contacts:  Son----Positive for Strep a week ago& Rxed with abx      TESTICULAR CA     - at 42 y/o   -resolved   -s/po test resection and radn   -is hypotestosterone and needs replacement     OVERWEIGHT    -BMI = 29.5  -comorbid hi LDL ,HTN         Amount of exercise or physical activity: None    Problems taking medications regularly: No    Medication side effects: none    Diet: regular (no restrictions)        Additional history: as documented    Reviewed  and updated as needed this visit by clinical staff  Tobacco  Allergies  Meds  Problems  Med Hx  Surg Hx  Fam Hx  Soc Hx          Reviewed and updated as needed this visit by Provider         Labs reviewed in EPIC    ROS:  CONSTITUTIONAL: NEGATIVE for fever, chills, change in weight  INTEGUMENTARY/SKIN: NEGATIVE for worrisome rashes, moles or lesions  EYES: NEGATIVE for vision changes or irritation  ENT/MOUTH: NEGATIVE for ear, mouth and throat problems, POSITIVE for , hoarse voice, nasal congestion, postnasal drainage, rhinorrhea-clear and sore throat  RESP:POSITIVE for  and cough-productive  BREAST: NEGATIVE for masses, tenderness or discharge  CV: NEGATIVE for chest pain, palpitations or peripheral edema  GI: NEGATIVE for nausea, abdominal pain, heartburn, or change in bowel habits  : NEGATIVE for frequency, dysuria, or  "hematuria  MUSCULOSKELETAL: NEGATIVE for significant arthralgias or myalgia  NEURO: NEGATIVE for weakness, dizziness or paresthesias  ENDOCRINE: NEGATIVE for temperature intolerance, skin/hair changes  HEME: NEGATIVE for bleeding problems  PSYCHIATRIC: NEGATIVE for changes in mood or affect    OBJECTIVE:     /64   Pulse 106   Temp 98  F (36.7  C) (Tympanic)   Resp 20   Ht 1.797 m (5' 10.75\")   Wt 95.3 kg (210 lb)   SpO2 97%   BMI 29.50 kg/m    Body mass index is 29.5 kg/m .  GENERAL: healthy, alert, no distress and over weight  EYES: Eyes grossly normal to inspection, PERRL and conjunctivae and sclerae normal  HENT: ear canals and TM's normal, nose and mouth without ulcers or lesions  NECK: no adenopathy, no asymmetry, masses, or scars and thyroid normal to palpation  RESP: lungs clear to auscultation - no rales, rhonchi or wheezes  CV: regular rate and rhythm, normal S1 S2, no S3 or S4, no murmur, click or rub, no peripheral edema and peripheral pulses strong  MS: no gross musculoskeletal defects noted, no edema  SKIN: no suspicious lesions or rashes  NEURO: Normal strength and tone, mentation intact and speech normal  PSYCH: mentation appears normal, affect normal/bright  LYMPH: Negative CCOA nodes and thyroid and inguinal nodes       Diagnostic Test Results:  Results for orders placed or performed in visit on 04/30/19   Strep, Rapid Screen   Result Value Ref Range    Specimen Description Throat     Rapid Strep A Screen       NEGATIVE: No Group A streptococcal antigen detected by immunoassay, await culture report.       ASSESSMENT/PLAN:               ICD-10-CM    1. Throat pain R07.0 Strep, Rapid Screen     Beta strep group A culture     sulfamethoxazole-trimethoprim (BACTRIM DS/SEPTRA DS) 800-160 MG tablet   2. Exposure to strep throat Z20.818 sulfamethoxazole-trimethoprim (BACTRIM DS/SEPTRA DS) 800-160 MG tablet   3. Acute non-recurrent sinusitis, unspecified location J01.90    4. Post-nasal drip " R09.82    5. Malignant neoplasm of testis, unspecified laterality, unspecified whether descended or undescended (H) C62.90    6. Overweight (BMI 25.0-29.9) E66.3        Patient Instructions   1. Shingrex is a 2 shot series that prevents shingles 97% of the time, as opposed to the old shingles shot that only prevented it at 40-50%  It costs less for medicare at a pharmacy  You should get it starting at 50 yrs old get the 2nd shot 5-6 mo after the first one    2. Run a cold air vaporizer as much as possible. If you cannot,  boil water and breath the warm vapors 2-3 times a day to try to open up the sinuses take 2400mgm of guaifenesin per 24 hours   You can do this by taking  Mucinex plain blue  1200 mg  One tablet twice a day (This may come as 600mg/tablet and you need to take 2 tabs twice a day) or you could buy the cheaper  generic 400mgm / tab and take 2 tablets 3 x a day or 1 and 1/2 tablets 4 x a day . .Guaifenesin is  the major component of most cough syrups, because it makes the mucus less thick, and therefore it drains out better and you are less likely to cough from it dripping on the back of your throat.  Irrigate the  nose with plain water under the kitchen sink faucet or the shower.  Cindy pots, spray bottles, etc accumulate bacteria and are not recommended.   The tickle in the throat is also helped by gargling with vinegar and honey mixture, or pop or mouth wash as these coat the throat.  Please try to rinse teeth with water after using these .   Do not use sudafed or pseudephedrine as it dries the mucus up so it is harder to get it out, and it can raise your BP         I  Explained the treatment and the reason for it     Late to rx for strep but gave septra and explained     Weight management plan: Discussed healthy diet and exercise guidelines      Aurora Marcos MD  [facility]

## 2019-05-01 ENCOUNTER — TELEPHONE (OUTPATIENT)
Dept: SLEEP MEDICINE | Facility: CLINIC | Age: 59
End: 2019-05-01

## 2019-05-01 LAB
BACTERIA SPEC CULT: NORMAL
SPECIMEN SOURCE: NORMAL

## 2019-05-01 NOTE — TELEPHONE ENCOUNTER
Left voice mail for patient for patient to reschedule his annual appt. Dr. Romano is out of the office that day in a conference.    Kiara Bhandari

## 2019-05-09 DIAGNOSIS — E29.1 HYPOGONADISM MALE: ICD-10-CM

## 2019-05-09 PROCEDURE — 36415 COLL VENOUS BLD VENIPUNCTURE: CPT | Performed by: FAMILY MEDICINE

## 2019-05-09 PROCEDURE — 84403 ASSAY OF TOTAL TESTOSTERONE: CPT | Performed by: FAMILY MEDICINE

## 2019-05-11 LAB — TESTOST SERPL-MCNC: 82 NG/DL (ref 240–950)

## 2019-05-13 ENCOUNTER — TELEPHONE (OUTPATIENT)
Dept: FAMILY MEDICINE | Facility: CLINIC | Age: 59
End: 2019-05-13

## 2019-05-13 DIAGNOSIS — E29.1 HYPOGONADISM MALE: ICD-10-CM

## 2019-05-13 NOTE — TELEPHONE ENCOUNTER
Central Prior Authorization Team   Phone: [phone]    PA Initiation    Medication: testosterone (ANDROGEL 1.62% PUMP) 20.25 MG/ACT gel-Initiated  Insurance Company: [employer] - Phone [phone] Fax [phone]  Pharmacy Filling the Rx: [facility] [address] [place] [address] [address] [place] [facility] [place] [facility] [address]  Filling Pharmacy Phone: [phone]  Filling Pharmacy Fax:    Start Date: 5/13/2019

## 2019-05-13 NOTE — TELEPHONE ENCOUNTER
Prior Authorization Retail Medication Request    Medication/Dose: testosterone (ANDROGEL 1.62% PUMP) 20.25 MG/ACT gel  ICD code (if different than what is on RX):  Hypogonadism male (E29.1),   Testicular cancer (H) C62.90       Previously Tried and Failed:    Rationale:  Post testicular cancer; right testicle removed in 2006    Insurance Name: [employer]   Insurance ID:  [id number]       Pharmacy Information (if different than what is on RX)  Name:  [facility] [id number] [place] [address]   Phone:  [phone]

## 2019-05-14 NOTE — TELEPHONE ENCOUNTER
PRIOR AUTHORIZATION DENIED    Medication: testosterone (ANDROGEL 1.62% PUMP) 20.25 MG/ACT gel-DENIED    Denial Date: 5/14/2019    Denial Rational: Patient meets criteria for testosterone but he has to use injectables (A PA will still need to be completed for the injectable but it will be approved since he meets criteria.)    He does not meet the criteria to have name brand Androgel.  Per Bart at [facility] Information: N/A

## 2019-05-17 NOTE — TELEPHONE ENCOUNTER
I have attempted to contact this patient by phone with the following results: left message to return my call on vm.

## 2019-05-17 NOTE — TELEPHONE ENCOUNTER
Check with Pt and see what he wants to do.  I will be willing to do the Rx for the testosterone shots but in past he has been reluctant to do that.

## 2019-05-20 RX ORDER — TESTOSTERONE CYPIONATE 200 MG/ML
200 INJECTION, SOLUTION INTRAMUSCULAR
Qty: 1 ML | Refills: 5 | Status: SHIPPED | OUTPATIENT
Start: 2019-05-20 | End: 2019-05-24 | Stop reason: ALTCHOICE

## 2019-05-20 NOTE — TELEPHONE ENCOUNTER
Rx order for DepoTestosterone 200 mg generated.  Fax to [facility].  We will still most likely need to get PA for approval for that

## 2019-05-20 NOTE — TELEPHONE ENCOUNTER
Spoke with pt and he is willing to do testosterone injections. Please send rx to [facility].

## 2019-05-24 ENCOUNTER — TELEPHONE (OUTPATIENT)
Dept: FAMILY MEDICINE | Facility: CLINIC | Age: 59
End: 2019-05-24

## 2019-05-24 ENCOUNTER — ALLIED HEALTH/NURSE VISIT (OUTPATIENT)
Dept: NURSING | Facility: CLINIC | Age: 59
End: 2019-05-24
Payer: COMMERCIAL

## 2019-05-24 DIAGNOSIS — E29.1 HYPOGONADISM MALE: Primary | ICD-10-CM

## 2019-05-24 DIAGNOSIS — E29.1 HYPOGONADISM MALE: ICD-10-CM

## 2019-05-24 PROCEDURE — 96372 THER/PROPH/DIAG INJ SC/IM: CPT

## 2019-05-24 RX ORDER — TESTOSTERONE ENANTHATE 200 MG/ML
INJECTION, SOLUTION INTRAMUSCULAR
Qty: 1 ML | Refills: 0 | Status: SHIPPED | OUTPATIENT
Start: 2019-05-24 | End: 2019-06-12

## 2019-05-24 RX ORDER — PROPRANOLOL HYDROCHLORIDE 20 MG/1
40 TABLET ORAL DAILY PRN
COMMUNITY
Start: 2019-05-17 | End: 2020-08-03 | Stop reason: DRUGHIGH

## 2019-05-24 RX ADMIN — TESTOSTERONE ENANTHATE 200 MG: 200 INJECTION, SOLUTION INTRAMUSCULAR at 16:28

## 2019-05-24 NOTE — PROGRESS NOTES
Prior to injection, verified patient identity using patient's name and date of birth.  Due to injection administration, patient instructed to remain in clinic for 15 minutes  afterwards, and to report any adverse reaction to me immediately.      Testosterone is in tamper evident bag with sales receipt from today: Yes     Check vial for refills remaining and initiate refill request if no refills remain.     Administered testosterone medication in clinic.  Medication was paid for at pharmacy.        Drug Amount Wasted:  Yes: 800 mg/ml   Vial/Syringe: Multi dose vial  Expiration Date:  11/2020    The following medication was given:     MEDICATION: Testosterone Enanthate 1000mg/5mL   ROUTE: IM  SITE: Ventrogluteal - Left  DOSE: 200mg/mL  LOT #: [id number]  :  [employer]  EXPIRATION DATE:  11/2020  NDC#: 0191-9174-38    Dorcas GOTTI RN, BSN, PHN

## 2019-05-24 NOTE — PROGRESS NOTES
Patient informed triage he is on Propanolol as prescribed by his Neurologist at [facility]. Updated medication list.    Dorcas GOTTI RN, BSN, PHN

## 2019-05-24 NOTE — TELEPHONE ENCOUNTER
Dr. Dunn,    Patient's insurance requires a Prior Authorization for testosterone cypionate.  They cover testosterone ethanthate.    Please have new Rx for testosterone ethanthate called or faxed over to the [facility] so patient can have the med administered in clinic.    Thank you,    Mahesh Saenz, PharmD  [facility]  [phone]

## 2019-05-28 ENCOUNTER — MYC MEDICAL ADVICE (OUTPATIENT)
Dept: FAMILY MEDICINE | Facility: CLINIC | Age: 59
End: 2019-05-28

## 2019-05-28 DIAGNOSIS — E29.1 HYPOGONADISM MALE: Primary | ICD-10-CM

## 2019-05-29 ENCOUNTER — TELEPHONE (OUTPATIENT)
Dept: FAMILY MEDICINE | Facility: CLINIC | Age: 59
End: 2019-05-29

## 2019-05-29 NOTE — TELEPHONE ENCOUNTER
Prior Authorization Retail Medication Request    Medication/Dose: [facility] pharmacy advance member notice of noncovered rx   ICD code (if different than what is on RX):     Previously Tried and Failed:     Rationale:       Insurance Name:     Insurance ID:         Pharmacy Information (if different than what is on RX)  [facility]   Phone:  [phone]

## 2019-05-30 NOTE — TELEPHONE ENCOUNTER
Prior Authorization Not Needed per Insurance    Medication: testosterone enanthate (DELATESTRYL) 200 MG/ML injection - PA NOT NEEDED  Insurance Company: [employer] - Phone [phone] Fax [phone]  Expected CoPay:      Pharmacy Filling the Rx: [facility] [address]  Pharmacy Notified: Yes  Patient Notified: No    I called [employer] and per Johanna, the requested medication does not require a PA.  It is covered under the patient's plan.  I did call the [facility] based on that I saw that the claim processed through the insurance for $1.00 and was picked up.

## 2019-05-31 DIAGNOSIS — E29.1 HYPOGONADISM MALE: ICD-10-CM

## 2019-05-31 PROCEDURE — 84403 ASSAY OF TOTAL TESTOSTERONE: CPT | Performed by: FAMILY MEDICINE

## 2019-05-31 PROCEDURE — 84270 ASSAY OF SEX HORMONE GLOBUL: CPT | Performed by: FAMILY MEDICINE

## 2019-05-31 PROCEDURE — 36415 COLL VENOUS BLD VENIPUNCTURE: CPT | Performed by: FAMILY MEDICINE

## 2019-06-04 LAB
SHBG SERPL-SCNC: 8 NMOL/L (ref 11–80)
TESTOST FREE SERPL-MCNC: 24.36 NG/DL (ref 4.7–24.4)
TESTOST SERPL-MCNC: 677 NG/DL (ref 240–950)

## 2019-06-05 ENCOUNTER — TELEPHONE (OUTPATIENT)
Dept: NUCLEAR MEDICINE | Facility: CLINIC | Age: 59
End: 2019-06-05

## 2019-06-07 DIAGNOSIS — E29.1 HYPOGONADISM MALE: ICD-10-CM

## 2019-06-07 PROCEDURE — 36415 COLL VENOUS BLD VENIPUNCTURE: CPT | Performed by: FAMILY MEDICINE

## 2019-06-07 PROCEDURE — 84270 ASSAY OF SEX HORMONE GLOBUL: CPT | Performed by: FAMILY MEDICINE

## 2019-06-07 PROCEDURE — 84403 ASSAY OF TOTAL TESTOSTERONE: CPT | Performed by: FAMILY MEDICINE

## 2019-06-10 ASSESSMENT — PATIENT HEALTH QUESTIONNAIRE - PHQ9
SUM OF ALL RESPONSES TO PHQ QUESTIONS 1-9: 6
10. IF YOU CHECKED OFF ANY PROBLEMS, HOW DIFFICULT HAVE THESE PROBLEMS MADE IT FOR YOU TO DO YOUR WORK, TAKE CARE OF THINGS AT HOME, OR GET ALONG WITH OTHER PEOPLE: SOMEWHAT DIFFICULT
SUM OF ALL RESPONSES TO PHQ QUESTIONS 1-9: 6

## 2019-06-11 ASSESSMENT — PATIENT HEALTH QUESTIONNAIRE - PHQ9: SUM OF ALL RESPONSES TO PHQ QUESTIONS 1-9: 6

## 2019-06-12 ENCOUNTER — OFFICE VISIT (OUTPATIENT)
Dept: FAMILY MEDICINE | Facility: CLINIC | Age: 59
End: 2019-06-12
Payer: COMMERCIAL

## 2019-06-12 ENCOUNTER — TELEPHONE (OUTPATIENT)
Dept: FAMILY MEDICINE | Facility: CLINIC | Age: 59
End: 2019-06-12

## 2019-06-12 VITALS
WEIGHT: 215 LBS | BODY MASS INDEX: 30.2 KG/M2 | HEART RATE: 105 BPM | OXYGEN SATURATION: 97 % | DIASTOLIC BLOOD PRESSURE: 70 MMHG | TEMPERATURE: 98.4 F | RESPIRATION RATE: 16 BRPM | SYSTOLIC BLOOD PRESSURE: 122 MMHG

## 2019-06-12 DIAGNOSIS — N52.9 ERECTILE DYSFUNCTION OF ORGANIC ORIGIN: ICD-10-CM

## 2019-06-12 DIAGNOSIS — I10 ESSENTIAL HYPERTENSION, BENIGN: ICD-10-CM

## 2019-06-12 DIAGNOSIS — E29.1 HYPOGONADISM MALE: ICD-10-CM

## 2019-06-12 DIAGNOSIS — N40.1 BENIGN PROSTATIC HYPERPLASIA WITH URINARY HESITANCY: ICD-10-CM

## 2019-06-12 DIAGNOSIS — R39.11 BENIGN PROSTATIC HYPERPLASIA WITH URINARY HESITANCY: ICD-10-CM

## 2019-06-12 DIAGNOSIS — E78.00 HYPERCHOLESTEROLEMIA: Primary | ICD-10-CM

## 2019-06-12 LAB
SHBG SERPL-SCNC: 9 NMOL/L (ref 11–80)
TESTOST FREE SERPL-MCNC: 3.04 NG/DL (ref 4.7–24.4)
TESTOST SERPL-MCNC: 96 NG/DL (ref 240–950)

## 2019-06-12 PROCEDURE — 99214 OFFICE O/P EST MOD 30 MIN: CPT | Performed by: FAMILY MEDICINE

## 2019-06-12 RX ORDER — LISINOPRIL 5 MG/1
5 TABLET ORAL DAILY
Qty: 90 TABLET | Refills: 3 | Status: SHIPPED | OUTPATIENT
Start: 2019-06-12 | End: 2019-11-19

## 2019-06-12 RX ORDER — ROSUVASTATIN CALCIUM 10 MG/1
5 TABLET, COATED ORAL DAILY
Qty: 45 TABLET | Refills: 1 | Status: SHIPPED | OUTPATIENT
Start: 2019-06-12 | End: 2019-11-19

## 2019-06-12 RX ORDER — TESTOSTERONE ENANTHATE 200 MG/ML
INJECTION, SOLUTION INTRAMUSCULAR
Qty: 1 ML | Refills: 5 | Status: SHIPPED | OUTPATIENT
Start: 2019-06-12 | End: 2019-07-30

## 2019-06-12 RX ORDER — TADALAFIL 5 MG/1
5 TABLET ORAL EVERY 24 HOURS
Qty: 90 TABLET | Refills: 3 | Status: SHIPPED | OUTPATIENT
Start: 2019-06-12 | End: 2020-02-25

## 2019-06-12 NOTE — TELEPHONE ENCOUNTER
Prior Authorization Retail Medication Request    Medication/Dose: Cialis 5 mg   ICD code (if different than what is on RX):  N52.9, N40.1 or R39.11  Previously Tried and Failed:  Not sure  Rationale:  Product/Service Not Covered  Plan/Benefit Exclusion    Insurance Name:  [employer]   Insurance ID:  [id number]      Pharmacy Information (if different than what is on RX)  [facility]:  [facility]  Phone:  [phone]            Thank You,  José Miguel Fernandes, [facility]  On behalf of [facility]

## 2019-06-12 NOTE — PROGRESS NOTES
Subjective     Mahesh Porter is a 58 year old male who presents to clinic today for the following health issues:    HPI     Patient is traveling to [place] this week to  a boat he purchased. Will be bringing it back up to [place] via water.    Medication Followup of Testosterone Enanthate Injection    Taking Medication as prescribed: yes    Side Effects:  None    Medication Helping Symptoms:  yes     Hypogonadism follow up      Duration: years    Description (location/character/radiation): low testosterone, had been using topical replacement but that is not covered by insurance    Intensity:  moderate    Accompanying signs and symptoms: decreased libido and erectile dysfunction    History (similar episodes/previous evaluation): has been ongoing    Precipitating or alleviating factors: None    Therapies tried and outcome: Testosterone gel in the past    Testosterone level on 5/31 1 week after shot was 677   Testosterone level on 6/7  2 weeks after shot was 96    Answers for HPI/ROS submitted by the patient on 6/10/2019   If you checked off any problems, how difficult have these problems made it for you to do your work, take care of things at home, or get along with other people?: Somewhat difficult  PHQ9 TOTAL SCORE: 6    Hyperlipidemia Follow-Up      Are you having any of the following symptoms? (Select all that apply)  No complaints of shortness of breath, chest pain or pressure.  No increased sweating or nausea with activity.  No left-sided neck or arm pain.  No complaints of pain in calves when walking 1-2 blocks.    Are you regularly taking any medication or supplement to lower your cholesterol?   Yes- Tricor and Crestor    Are you having muscle aches or other side effects that you think could be caused by your cholesterol lowering medication?  No      Hypertension Follow-up      Do you check your blood pressure regularly outside of the clinic? No     Are you following a low salt diet? Yes    Are your blood  pressures ever more than 140 on the top number (systolic) OR more   than 90 on the bottom number (diastolic), for example 140/90? No    BP Readings from Last 3 Encounters:   06/12/19 122/70   04/30/19 130/64   04/15/19 118/80    Wt Readings from Last 3 Encounters:   06/12/19 97.5 kg (215 lb)   04/30/19 95.3 kg (210 lb)   04/15/19 94.8 kg (209 lb)                      Reviewed and updated as needed this visit by Provider         Review of Systems   ROS COMP: Constitutional, HEENT, cardiovascular, pulmonary, gi and gu systems are negative, except as otherwise noted.      Objective    /70 (BP Location: Left arm, Patient Position: Sitting, Cuff Size: Adult Large)   Pulse 105   Temp 98.4  F (36.9  C) (Skin)   Resp 16   Wt 97.5 kg (215 lb)   SpO2 97%   BMI 30.20 kg/m    Body mass index is 30.2 kg/m .  Physical Exam   GENERAL: healthy, alert and no distress  NECK: no adenopathy, no asymmetry, masses, or scars and thyroid normal to palpation  RESP: lungs clear to auscultation - no rales, rhonchi or wheezes  CV: regular rate and rhythm, normal S1 S2, no S3 or S4, no murmur, click or rub, no peripheral edema and peripheral pulses strong  ABDOMEN: soft, nontender, no hepatosplenomegaly, no masses and bowel sounds normal  MS: no gross musculoskeletal defects noted, no edema    Diagnostic Test Results:  Labs reviewed in Epic        Assessment & Plan     Mahesh was seen today for recheck medication.    Diagnoses and all orders for this visit:    Hypercholesterolemia  -     rosuvastatin (CRESTOR) 10 MG tablet; Take 0.5 tablets (5 mg) by mouth daily    Essential hypertension, benign  -     lisinopril (PRINIVIL/ZESTRIL) 5 MG tablet; Take 1 tablet (5 mg) by mouth daily    Hypogonadism male  -     testosterone enanthate (DELATESTRYL) 200 MG/ML injection; Inject into the muscle every 14 days    Erectile dysfunction of organic origin  -     tadalafil (CIALIS) 5 MG tablet; Take 1 tablet (5 mg) by mouth every 24 hours    Benign  "prostatic hyperplasia with urinary hesitancy  -     tadalafil (CIALIS) 5 MG tablet; Take 1 tablet (5 mg) by mouth every 24 hours         BMI:   Estimated body mass index is 30.2 kg/m  as calculated from the following:    Height as of 4/30/19: 1.797 m (5' 10.75\").    Weight as of this encounter: 97.5 kg (215 lb).   Weight management plan: Discussed healthy diet and exercise guidelines        FUTURE APPOINTMENTS:       - Follow-up visit in 2 mo   Pt is going to work to be able to give himself the testosterone injections.  He wants the next one to be given at pharmacy yet  His labs clearly show that he will need to get a shot every 2 weeks  Patient Instructions   Continue current medications      Return in about 2 months (around 8/12/2019).    Tyrone Dunn MD  [facility]        "

## 2019-06-12 NOTE — TELEPHONE ENCOUNTER
Pharmacy called to clarify directions with Testosterone injection- yes, patient does inject full 1 mL every 14 days.     Previously doing injections every 28 days; did clarify with provider that he has changed it to every 14 days.     Prescription only comes in 5 mL, and once opened, it is good for 28 days.   Huddled with provider, verbal approval given to dispense 5 mL today and have 5 refills on file for patient. No further action at this time.

## 2019-06-14 NOTE — TELEPHONE ENCOUNTER
Central Prior Authorization Team  Phone: [phone]    PA Initiation    Medication: Cialis 5 mg   Insurance Company: [employer] - Phone [phone] Fax [phone]  Pharmacy Filling the Rx: [place], [place] - [address]  Filling Pharmacy Phone: [phone]  Filling Pharmacy Fax:    Start Date: 6/14/2019

## 2019-06-17 ENCOUNTER — OFFICE VISIT (OUTPATIENT)
Dept: FAMILY MEDICINE | Facility: CLINIC | Age: 59
End: 2019-06-17
Payer: COMMERCIAL

## 2019-06-17 ENCOUNTER — ALLIED HEALTH/NURSE VISIT (OUTPATIENT)
Dept: FAMILY MEDICINE | Facility: CLINIC | Age: 59
End: 2019-06-17
Payer: COMMERCIAL

## 2019-06-17 VITALS
SYSTOLIC BLOOD PRESSURE: 124 MMHG | RESPIRATION RATE: 16 BRPM | HEIGHT: 71 IN | BODY MASS INDEX: 30.24 KG/M2 | TEMPERATURE: 98.6 F | WEIGHT: 216 LBS | DIASTOLIC BLOOD PRESSURE: 80 MMHG | OXYGEN SATURATION: 94 % | HEART RATE: 83 BPM

## 2019-06-17 DIAGNOSIS — E29.1 HYPOGONADISM MALE: ICD-10-CM

## 2019-06-17 DIAGNOSIS — Z71.89 INJECTION EDUCATION, ENCOUNTER FOR: Primary | ICD-10-CM

## 2019-06-17 DIAGNOSIS — E29.1 HYPOGONADISM MALE: Primary | ICD-10-CM

## 2019-06-17 RX ORDER — BUPROPION HYDROCHLORIDE 300 MG/1
300 TABLET ORAL EVERY MORNING
COMMUNITY
Start: 2019-06-17 | End: 2020-02-11

## 2019-06-17 ASSESSMENT — MIFFLIN-ST. JEOR: SCORE: 1817.93

## 2019-06-17 NOTE — NURSING NOTE
".Patient presented to clinic with all supplies for IM injection teaching.     RN reviewed necessary supplies: difference in needles (drawing up vs injecting), how to read a syringe, how to read medication label, disposal of sharps, and proper hand hygiene.     Discussed how to switch out needles and patient demonstrated understanding of reading syringe. RN reviewed safe needle handling (using \"scoop\" method). Patient independently domitila up proper amount of Testosterone.     RN discussed site for IM injection and reviewed proper IM injection technique. Patient practiced using injecting pad.    At end of visit, patient independently completed self-injection of 1 mL (200 mg) Tesosteron into left thigh under supervision of RN.    Completed visit with discussion of refill policy.     Patient verbalized understanding and was engaged during appointment.    Dr. Ko/Jerome was the preceptor on site for this visit and available for questions.    Mic Law RN      "

## 2019-06-17 NOTE — TELEPHONE ENCOUNTER
PRIOR AUTHORIZATION DENIED    Medication: Cialis 5 mg- DENIED    Denial Date: 6/15/2019    Denial Rational: PA denied by insurance. Medication is excluded from patient's insurance plan.    Appeal Information: If provider would like to appeal, please provide a letter of medical necessity.         Include Location In Plan?: Yes Detail Level: Zone Muscle Hinge Flap Text: The defect edges were debeveled with a #15 scalpel blade.  Given the size, depth and location of the defect and the proximity to free margins a muscle hinge flap was deemed most appropriate.  Using a sterile surgical marker, an appropriate hinge flap was drawn incorporating the defect. The area thus outlined was incised with a #15 scalpel blade.  The skin margins were undermined to an appropriate distance in all directions utilizing iris scissors.

## 2019-06-17 NOTE — PROGRESS NOTES
Testosterone injections supplies reordered per verbal orders/standing protocol and sent electronically to [facility] pharmacy.      Mic Law RN

## 2019-06-17 NOTE — PROGRESS NOTES
HPI       Mahesh Porter is a 59 year old  who presents for   Chief Complaint   Patient presents with     Imm/Inj     discuss about testeosterone injection per patient      Establish Care     Here to establish care at clinic, also for testosterone injection today.    Mahesh Porter is a patient of Dr. Dunn (last seen 6/12/19), treated with testosterone injections for hypogonadism.    Looking to establish care here at [facility]  Headed to [place] to  a boat, he will be gone for ~ 3 weeks and cannot get labs until he returns he states.  Wishes for another round of injection training so he can self-administer testosterone in 2 weeks as planned, has medications prescribed previously but will wish for future prescriptions from our clinic (pending lab results).    Hypogonadism follow up  Transitioning to injectable testosterone, has not had training yet on injections.  Due for injection today  Testosterone level on 5/31 1 week after shot was 677   Testosterone level on 6/7  2 weeks after shot was 96  He is now 3+ weeks post-injection    Duration: years    Description (location/character/radiation): low testosterone, had been using topical replacement but that is not covered by insurance    Intensity:  moderate    Accompanying signs and symptoms: decreased libido and erectile dysfunction    History (similar episodes/previous evaluation): has been ongoing    Precipitating or alleviating factors: None    Therapies tried and outcome: Testosterone gel in the past        +++++++  Problem, Medication and Allergy Lists were reviewed and updated if needed..    Patient is a new patient to this clinic and so  I reviewed/updated the Past Medical History, the Family History and the Social History .         Review of Systems:   Review of Systems   Constitutional: Positive for fatigue. Negative for chills and fever.   HENT: Negative for congestion and sore throat.    Eyes: Negative for visual disturbance.   Respiratory:  "Negative for cough and shortness of breath.    Cardiovascular: Negative for chest pain and leg swelling.   Gastrointestinal: Negative for abdominal pain, diarrhea, nausea and vomiting.   Genitourinary: Negative for dysuria.        Decreased libido  ED   Musculoskeletal: Negative for joint swelling and myalgias.   Skin: Negative for rash.   Neurological: Negative for dizziness and light-headedness.   Psychiatric/Behavioral: Negative for confusion.            Physical Exam:     Vitals:    06/17/19 1608   BP: 124/80   Pulse: 83   Resp: 16   Temp: 98.6  F (37  C)   TempSrc: Oral   SpO2: 94%   Weight: 98 kg (216 lb)   Height: 1.797 m (5' 10.75\")     Body mass index is 30.34 kg/m .  Vitals were reviewed and were normal     Physical Exam   Constitutional: He is oriented to person, place, and time. He appears well-developed and well-nourished. No distress.   HENT:   Head: Normocephalic and atraumatic.   Eyes: EOM are normal. Right eye exhibits no discharge. Left eye exhibits no discharge.   Neck: Normal range of motion. Neck supple.   Cardiovascular: Normal rate and regular rhythm.   No murmur heard.  Pulmonary/Chest: Effort normal and breath sounds normal. No respiratory distress. He has no wheezes. He has no rales.   Musculoskeletal: He exhibits no edema.   Neurological: He is alert and oriented to person, place, and time. He exhibits normal muscle tone.   Skin: Skin is warm and dry. He is not diaphoretic.   Psychiatric: He has a normal mood and affect. His behavior is normal.       Results:   Results from last visit: None ordered today    Orders Only on 06/07/2019   Component Date Value Ref Range Status     Testosterone Total 06/07/2019 96* 240 - 950 ng/dL Final    Comment: This test was developed and its performance characteristics determined by the   [facility],  Special [facility]. It has   not been cleared or approved by the FDA. The laboratory is regulated under   CLIA as " qualified to perform high-complexity testing. This test is used for   clinical purposes. It should not be regarded as investigational or for   research.       Sex Hormone Binding Globulin 06/07/2019 9* 11 - 80 nmol/L Final     Free Testosterone Calculated 06/07/2019 3.04* 4.7 - 24.4 ng/dL Final       Assessment and Plan        1. Hypogonadism male  Chung is looking to establish care here at [facility].  Today we focused on his hypogonadism (secondary to testicular cancer and removal of one testicle with other testicle not functioning fully).  Has been on testosterone, switching from gel to injectable due to insurance.  Needed training today so he can self-administer while out of town in [place].  Currently q2week injections, adjusting dosage, will give himself one injection today with training, another in 2 weeks in [place], then on return will get lab testing.         Medications Discontinued During This Encounter   Medication Reason     sildenafil (REVATIO) 20 MG tablet      fenofibrate 54 MG tablet      buPROPion (WELLBUTRIN XL) 150 MG 24 hr tablet      rosuvastatin (CRESTOR) 5 MG tablet        Options for treatment and follow-up care were reviewed with the patient. Mahesh Porter  engaged in the decision making process and verbalized understanding of the options discussed and agreed with the final plan.    Jourdan Burns MD

## 2019-06-17 NOTE — PATIENT INSTRUCTIONS
Here is the plan from today's visit    1. Hypogonadism male  Return for labs and further discussion at next visit after returning from [place]    Please call or return to clinic if your symptoms don't go away.    Follow up plan  Establish care with new physician at next visit    Thank you for coming to [facility] today.  Lab Testing:  **If you had lab testing today and your results are reassuring or normal they will be mailed to you or sent through [employer] within 7 days.   **If the lab tests need quick action we will call you with the results.  The phone number we will call with results is # [phone] (home) . If this is not the best number please call our clinic and change the number.  Medication Refills:  If you need any refills please call your pharmacy and they will contact us.   If you need to  your refill at a new pharmacy, please contact the new pharmacy directly. The new pharmacy will help you get your medications transferred faster.   Scheduling:  If you have any concerns about today's visit or wish to schedule another appointment please call our office during normal business hours [phone] (8-5:00 M-F)  If a referral was made to a [facility] and you don't get a call from central scheduling please call [phone].  If a Mammogram was ordered for you at [facility] call [phone] to schedule or change your appointment.  If you had an XRay/CT/Ultrasound/MRI ordered the number is [phone] to schedule or change your radiology appointment.   Medical Concerns:  If you have urgent medical concerns please call [phone] at any time of the day.    Jourdan Burns MD

## 2019-06-17 NOTE — PROGRESS NOTES
Preceptor Attestation:   Patient seen, evaluated and discussed with the resident. I have verified the content of the note, which accurately reflects my assessment of the patient and the plan of care.   Supervising Physician:  Ange Cano MD

## 2019-06-19 ASSESSMENT — ENCOUNTER SYMPTOMS
JOINT SWELLING: 0
CONFUSION: 0
DIZZINESS: 0
LIGHT-HEADEDNESS: 0
COUGH: 0
MYALGIAS: 0
DIARRHEA: 0
NAUSEA: 0
SHORTNESS OF BREATH: 0
FATIGUE: 1
VOMITING: 0
FEVER: 0
SORE THROAT: 0
DYSURIA: 0
CHILLS: 0
ABDOMINAL PAIN: 0

## 2019-07-12 ENCOUNTER — TELEPHONE (OUTPATIENT)
Dept: FAMILY MEDICINE | Facility: CLINIC | Age: 59
End: 2019-07-12

## 2019-07-12 DIAGNOSIS — E78.00 HYPERCHOLESTEROLEMIA: ICD-10-CM

## 2019-07-12 DIAGNOSIS — E29.1 HYPOGONADISM MALE: Primary | ICD-10-CM

## 2019-07-12 NOTE — TELEPHONE ENCOUNTER
Patient returned call, states that he will come in tomorrow for testosterone check.     Pt mentions he is wondering if he should have his lipids done too? Pended order. Routing to provider to advise.

## 2019-07-12 NOTE — TELEPHONE ENCOUNTER
Reason for Call:  Other       Detailed comments: would like orders for testosterone check put in.  Would like to come today or tomorrow. Said only going to be in town a few days    Phone Number Patient can be reached at: Home number on file [phone] (home)    Best Time: today    Can we leave a detailed message on this number? YES    Call taken on 7/12/2019 at 9:37 AM by FRANCA DENNEY

## 2019-07-12 NOTE — TELEPHONE ENCOUNTER
Patient Contact    Attempt # 1    Was call answered?  No.  Left message on voicemail with information to call triage back.    Upon callback, notify that future lab was ordered. Schedule for lab only visit.

## 2019-07-12 NOTE — TELEPHONE ENCOUNTER
See message below. Pended testosterone lab order. Routing to provider to approve or add additional labs.

## 2019-07-13 DIAGNOSIS — E78.00 HYPERCHOLESTEROLEMIA: ICD-10-CM

## 2019-07-13 DIAGNOSIS — E29.1 HYPOGONADISM MALE: ICD-10-CM

## 2019-07-13 LAB
CHOLEST SERPL-MCNC: 214 MG/DL
HDLC SERPL-MCNC: 49 MG/DL
LDLC SERPL CALC-MCNC: 115 MG/DL
NONHDLC SERPL-MCNC: 165 MG/DL
TRIGL SERPL-MCNC: 248 MG/DL

## 2019-07-13 PROCEDURE — 80061 LIPID PANEL: CPT | Performed by: FAMILY MEDICINE

## 2019-07-13 PROCEDURE — 84403 ASSAY OF TOTAL TESTOSTERONE: CPT | Performed by: FAMILY MEDICINE

## 2019-07-13 PROCEDURE — 84270 ASSAY OF SEX HORMONE GLOBUL: CPT | Performed by: FAMILY MEDICINE

## 2019-07-13 PROCEDURE — 36415 COLL VENOUS BLD VENIPUNCTURE: CPT | Performed by: FAMILY MEDICINE

## 2019-07-17 LAB
SHBG SERPL-SCNC: 8 NMOL/L (ref 11–80)
TESTOST FREE SERPL-MCNC: 4.88 NG/DL (ref 4.7–24.4)
TESTOST SERPL-MCNC: 148 NG/DL (ref 240–950)

## 2019-07-30 ENCOUNTER — MYC MEDICAL ADVICE (OUTPATIENT)
Dept: FAMILY MEDICINE | Facility: CLINIC | Age: 59
End: 2019-07-30

## 2019-07-30 DIAGNOSIS — E29.1 HYPOGONADISM MALE: ICD-10-CM

## 2019-07-30 RX ORDER — TESTOSTERONE ENANTHATE 200 MG/ML
INJECTION, SOLUTION INTRAMUSCULAR
Qty: 1 ML | Refills: 5 | Status: SHIPPED | OUTPATIENT
Start: 2019-07-30 | End: 2020-03-09

## 2019-07-30 NOTE — TELEPHONE ENCOUNTER
Routing refill request to provider for review/approval because:  Drug not on the [facility] refill protocol   Needs provider review

## 2019-07-30 NOTE — TELEPHONE ENCOUNTER
"Requested Prescriptions   Pending Prescriptions Disp Refills     needle, disp, 18G X 1\" MISC  Last Written Prescription Date:  6/17/2019  Last Fill Quantity: 14,  # refills: 3   Last office visit: No previous visit found with prescribing provider:  Mona Palm Office Visit:     14 each 3     Sig: Use to draw up hormones       There is no refill protocol information for this order        syringe, disposable, 1 ML MISC  Last Written Prescription Date:  6/17/2019  Last Fill Quantity: 14,  # refills: 3   Last office visit: No previous visit found with prescribing provider:  Mona Palm Office Visit:     14 each 3     Sig: Use  to draw up hormones       There is no refill protocol information for this order        testosterone enanthate (DELATESTRYL) 200 MG/ML injection  Last Written Prescription Date:  6/12/2019  Last Fill Quantity: 1,  # refills: 5   Last office visit: No previous visit found with prescribing provider:  Mona Palm Office Visit:     1 mL 5     Sig: Inject into the muscle every 14 days       Androgen Agents Failed - 7/30/2019 12:35 PM        Failed - HCT less than 54% on file within past 12 mos     Recent Labs   Lab Test 07/06/18  0747   HCT 43.2             Failed - Serum PSA on file within past 12 mos     Lab Results   Component Value Date    PSA 2.81 07/06/2018             Failed - Refills for this classification require provider review        Passed - Patient is of age 12 and older        Passed - Lipid panel on file in past 12 mos     Recent Labs   Lab Test 07/13/19  0838   CHOL 214*   TRIG 248*   HDL 49   *   NHDL 165*               Passed - ALT on file within past 12 mos     Recent Labs   Lab Test 04/15/19  1120   ALT 32             Passed - Medication is active on med list        Passed - Serum Testosterone on file within past 12 mos     Recent Labs   Lab Test 07/13/19  0838   TESTOSTTOTAL 148*             Passed - Blood pressure under 140/90 in past 6 months     BP Readings from " Last 3 Encounters:   06/17/19 124/80   06/12/19 122/70   04/30/19 130/64                 Passed - Patient is not pregnant        Passed - No positive pregnancy test on file within past 12 mos        Passed - Recent (6 mo) or future (30 days) visit within the authorizing provider's specialty        Passed - AST on file within past 12 mos     Recent Labs   Lab Test 04/15/19  1120   AST 22

## 2019-07-30 NOTE — TELEPHONE ENCOUNTER
Medications were updated to pharmacy that patient requested and sent back to provider.     No further follow up.

## 2019-07-30 NOTE — TELEPHONE ENCOUNTER
New mychart message received. Patient would like supplies sent to another pharmacy. This was updated on medications.     [address] [place]  [address]   [facility]

## 2019-07-31 ENCOUNTER — TELEPHONE (OUTPATIENT)
Dept: FAMILY MEDICINE | Facility: CLINIC | Age: 59
End: 2019-07-31

## 2019-07-31 DIAGNOSIS — E29.1 HYPOGONADISM MALE: Primary | ICD-10-CM

## 2019-07-31 RX ORDER — TESTOSTERONE CYPIONATE 200 MG/ML
200 INJECTION, SOLUTION INTRAMUSCULAR
Qty: 10 ML | Refills: 0 | Status: SHIPPED | OUTPATIENT
Start: 2019-07-31 | End: 2019-11-19

## 2019-07-31 NOTE — TELEPHONE ENCOUNTER
[facility] pharmacist called to report they don't have testosterone enanthate and asked if they can change order to testosterone Cypionate. Pharmacist also request specific directions. Is pt to inject 1 ml every 14 days?

## 2019-07-31 NOTE — TELEPHONE ENCOUNTER
Yes change order to testosterone Cypionate. I sent new Rx down to them.  Yes it should be 1 ml every 14 days

## 2019-08-01 ENCOUNTER — TELEPHONE (OUTPATIENT)
Dept: FAMILY MEDICINE | Facility: CLINIC | Age: 59
End: 2019-08-01

## 2019-08-01 NOTE — TELEPHONE ENCOUNTER
Prior Authorization Retail Medication Request    Medication/Dose: Testosterone Cypionate 200MG/ML solution  ICD code (if different than what is on RX):    Previously Tried and Failed:    Rationale:      Insurance Name:    Insurance ID:        Pharmacy Information (if different than what is on RX)  Name:    Phone:      PA started on CMM. Key: [id number]

## 2019-08-09 NOTE — TELEPHONE ENCOUNTER
Central Prior Authorization Team   Phone: [phone]      PA Initiation    Medication: Testosterone Cypionate 200MG/ML solution  Insurance Company: [employer] - Phone [phone] Fax [phone]  Pharmacy Filling the Rx: [id number] - [place], [place] - [address] AT [facility] [place] [address]  Filling Pharmacy Phone: [phone]  Filling Pharmacy Fax:    Start Date: 8/9/2019

## 2019-08-12 NOTE — TELEPHONE ENCOUNTER
Prior Authorization Approval    Authorization Effective Date: 8/10/2019  Authorization Expiration Date: 8/9/2020  Medication: Testosterone Cypionate 200MG/ML solution  Approved Dose/Quantity:    Reference #:     Insurance Company: [employer] - Phone [phone] Fax [phone]  Expected CoPay:       CoPay Card Available:      Foundation Assistance Needed:    Which Pharmacy is filling the prescription (Not needed for infusion/clinic administered): [id number] - [place], [place] - [facility] [address] [facility] AT [facility] OF [facility] [address]  Pharmacy Notified: Yes  Patient Notified: Yes **Instructed pharmacy to notify patient when script is ready to /ship.**

## 2019-08-14 DIAGNOSIS — E78.00 HYPERCHOLESTEROLEMIA: ICD-10-CM

## 2019-08-14 RX ORDER — ROSUVASTATIN CALCIUM 10 MG/1
5 TABLET, COATED ORAL DAILY
Qty: 45 TABLET | Refills: 1 | Status: CANCELLED | OUTPATIENT
Start: 2019-08-14

## 2019-08-14 NOTE — TELEPHONE ENCOUNTER
Sent patient a message on my chart to confirm pharmacy.    DELANO McgrawN, RN  [employer] [facility]

## 2019-08-16 NOTE — TELEPHONE ENCOUNTER
See separate [facility] message 8/14/19; patient states that he has gotten all meds filled at this time.

## 2019-11-19 ENCOUNTER — MYC REFILL (OUTPATIENT)
Dept: FAMILY MEDICINE | Facility: CLINIC | Age: 59
End: 2019-11-19

## 2019-11-19 DIAGNOSIS — E78.00 HYPERCHOLESTEROLEMIA: ICD-10-CM

## 2019-11-19 DIAGNOSIS — I10 ESSENTIAL HYPERTENSION, BENIGN: ICD-10-CM

## 2019-11-19 DIAGNOSIS — E29.1 HYPOGONADISM MALE: ICD-10-CM

## 2019-11-20 RX ORDER — ROSUVASTATIN CALCIUM 10 MG/1
5 TABLET, COATED ORAL DAILY
Qty: 45 TABLET | Refills: 1 | OUTPATIENT
Start: 2019-11-20

## 2019-11-20 RX ORDER — LISINOPRIL 5 MG/1
5 TABLET ORAL DAILY
Qty: 90 TABLET | Refills: 2 | Status: SHIPPED | OUTPATIENT
Start: 2019-11-20 | End: 2020-06-29

## 2019-11-20 RX ORDER — TESTOSTERONE CYPIONATE 200 MG/ML
200 INJECTION, SOLUTION INTRAMUSCULAR
Qty: 10 ML | Refills: 0 | Status: SHIPPED | OUTPATIENT
Start: 2019-11-20 | End: 2020-01-13

## 2019-11-20 RX ORDER — ROSUVASTATIN CALCIUM 10 MG/1
5 TABLET, COATED ORAL DAILY
Qty: 45 TABLET | Refills: 2 | Status: SHIPPED | OUTPATIENT
Start: 2019-11-20 | End: 2020-05-13

## 2019-11-20 NOTE — TELEPHONE ENCOUNTER
"Requested Prescriptions   Pending Prescriptions Disp Refills     lisinopril (PRINIVIL/ZESTRIL) 5 MG tablet  Last Written Prescription Date:  6/12/2019  Last Fill Quantity: 90 tablet,  # refills: 3   Last office visit: 6/12/2019 with prescribing provider:  Mona   Future Office Visit:     90 tablet 3     Sig: Take 1 tablet (5 mg) by mouth daily       ACE Inhibitors (Including Combos) Protocol Failed - 11/20/2019  7:27 AM        Failed - Normal serum creatinine on file in past 12 months     Recent Labs   Lab Test 04/15/19  1120   CR 1.28*             Passed - Blood pressure under 140/90 in past 12 months     BP Readings from Last 3 Encounters:   06/17/19 124/80   06/12/19 122/70   04/30/19 130/64                 Passed - Recent (12 mo) or future (30 days) visit within the authorizing provider's specialty     Patient has had an office visit with the authorizing provider or a provider within the authorizing providers department within the previous 12 mos or has a future within next 30 days. See \"Patient Info\" tab in inbasket, or \"Choose Columns\" in Meds & Orders section of the refill encounter.              Passed - Medication is active on med list        Passed - Patient is age 18 or older        Passed - Normal serum potassium on file in past 12 months     .3  Recent Labs   Lab Test 04/15/19  1120   POTASSIUM 4.5             rosuvastatin (CRESTOR) 10 MG tablet  Last Written Prescription Date:  6/12/2019  Last Fill Quantity: 45 tablet,  # refills: 1   Last office visit: 6/12/2019 with prescribing provider:  Mona   Future Office Visit:     45 tablet 1     Sig: Take 0.5 tablets (5 mg) by mouth daily       Statins Protocol Passed - 11/20/2019  7:27 AM        Passed - LDL on file in past 12 months     Recent Labs   Lab Test 07/13/19  0838   *             Passed - No abnormal creatine kinase in past 12 months     No lab results found.             Passed - Recent (12 mo) or future (30 days) visit within the " "authorizing provider's specialty     Patient has had an office visit with the authorizing provider or a provider within the authorizing providers department within the previous 12 mos or has a future within next 30 days. See \"Patient Info\" tab in inbasket, or \"Choose Columns\" in Meds & Orders section of the refill encounter.              Passed - Medication is active on med list        Passed - Patient is age 18 or older           "

## 2019-11-20 NOTE — TELEPHONE ENCOUNTER
Routing refill request to provider for review/approval because:  Drug not on the [facility] refill protocol   Labs not current:  Hematocrit, PSA    DELANO McgrawN, RN  [employer] [facility] [facility]

## 2019-11-20 NOTE — TELEPHONE ENCOUNTER
Filled per [facility] protocol. Pharmacy change      DELANO Montes De OcaN, RN  [employer] [facility]

## 2019-11-20 NOTE — TELEPHONE ENCOUNTER
Requested Prescriptions   Pending Prescriptions Disp Refills     testosterone cypionate (DEPOTESTOSTERONE) 200 MG/ML injection  Last Written Prescription Date:  7/31/2019  Last Fill Quantity: 10 mL,  # refills: 0   Last office visit: 6/12/2019 with prescribing provider:  Mona   Future Office Visit:     10 mL 0     Sig: Inject 1 mL (200 mg) into the muscle every 14 days       Androgen Agents Failed - 11/20/2019  7:27 AM        Failed - HCT less than 54% on file within past 12 mos     Recent Labs   Lab Test 07/06/18  0747   HCT 43.2             Failed - Serum PSA on file within past 12 mos     Lab Results   Component Value Date    PSA 2.81 07/06/2018             Failed - Refills for this classification require provider review        Passed - Patient is of age 12 and older        Passed - Lipid panel on file in past 12 mos     Recent Labs   Lab Test 07/13/19  0838   CHOL 214*   TRIG 248*   HDL 49   *   NHDL 165*               Passed - ALT on file within past 12 mos     Recent Labs   Lab Test 04/15/19  1120   ALT 32             Passed - Medication is active on med list        Passed - Serum Testosterone on file within past 12 mos     Recent Labs   Lab Test 07/13/19  0838   TESTOSTTOTAL 148*             Passed - Blood pressure under 140/90 in past 6 months     BP Readings from Last 3 Encounters:   06/17/19 124/80   06/12/19 122/70   04/30/19 130/64                 Passed - Patient is not pregnant        Passed - No positive pregnancy test on file within past 12 mos        Passed - Recent (6 mo) or future (30 days) visit within the authorizing provider's specialty        Passed - AST on file within past 12 mos     Recent Labs   Lab Test 04/15/19  1120   AST 22

## 2020-01-13 ENCOUNTER — OFFICE VISIT (OUTPATIENT)
Dept: FAMILY MEDICINE | Facility: CLINIC | Age: 60
End: 2020-01-13
Payer: COMMERCIAL

## 2020-01-13 VITALS
WEIGHT: 216 LBS | SYSTOLIC BLOOD PRESSURE: 118 MMHG | OXYGEN SATURATION: 96 % | HEART RATE: 79 BPM | HEIGHT: 71 IN | BODY MASS INDEX: 30.24 KG/M2 | RESPIRATION RATE: 14 BRPM | TEMPERATURE: 98.1 F | DIASTOLIC BLOOD PRESSURE: 70 MMHG

## 2020-01-13 DIAGNOSIS — C62.91 MALIGNANT NEOPLASM OF RIGHT TESTIS, UNSPECIFIED WHETHER DESCENDED OR UNDESCENDED (H): ICD-10-CM

## 2020-01-13 DIAGNOSIS — E78.00 HYPERCHOLESTEROLEMIA: ICD-10-CM

## 2020-01-13 DIAGNOSIS — K40.90 NON-RECURRENT UNILATERAL INGUINAL HERNIA WITHOUT OBSTRUCTION OR GANGRENE: Primary | ICD-10-CM

## 2020-01-13 DIAGNOSIS — I10 ESSENTIAL HYPERTENSION, BENIGN: ICD-10-CM

## 2020-01-13 DIAGNOSIS — E29.1 HYPOGONADISM MALE: ICD-10-CM

## 2020-01-13 LAB
ALT SERPL W P-5'-P-CCNC: 34 U/L (ref 0–70)
ANION GAP SERPL CALCULATED.3IONS-SCNC: 4 MMOL/L (ref 3–14)
AST SERPL W P-5'-P-CCNC: 18 U/L (ref 0–45)
BUN SERPL-MCNC: 21 MG/DL (ref 7–30)
CALCIUM SERPL-MCNC: 9 MG/DL (ref 8.5–10.1)
CHLORIDE SERPL-SCNC: 105 MMOL/L (ref 94–109)
CHOLEST SERPL-MCNC: 258 MG/DL
CO2 SERPL-SCNC: 29 MMOL/L (ref 20–32)
CREAT SERPL-MCNC: 1.46 MG/DL (ref 0.66–1.25)
GFR SERPL CREATININE-BSD FRML MDRD: 52 ML/MIN/{1.73_M2}
GLUCOSE SERPL-MCNC: 103 MG/DL (ref 70–99)
HDLC SERPL-MCNC: 56 MG/DL
LDLC SERPL CALC-MCNC: 132 MG/DL
NONHDLC SERPL-MCNC: 202 MG/DL
POTASSIUM SERPL-SCNC: 4.1 MMOL/L (ref 3.4–5.3)
SODIUM SERPL-SCNC: 138 MMOL/L (ref 133–144)
TRIGL SERPL-MCNC: 351 MG/DL

## 2020-01-13 PROCEDURE — 84450 TRANSFERASE (AST) (SGOT): CPT | Performed by: FAMILY MEDICINE

## 2020-01-13 PROCEDURE — 84403 ASSAY OF TOTAL TESTOSTERONE: CPT | Performed by: FAMILY MEDICINE

## 2020-01-13 PROCEDURE — 80061 LIPID PANEL: CPT | Performed by: FAMILY MEDICINE

## 2020-01-13 PROCEDURE — 84460 ALANINE AMINO (ALT) (SGPT): CPT | Performed by: FAMILY MEDICINE

## 2020-01-13 PROCEDURE — 99214 OFFICE O/P EST MOD 30 MIN: CPT | Performed by: FAMILY MEDICINE

## 2020-01-13 PROCEDURE — 36415 COLL VENOUS BLD VENIPUNCTURE: CPT | Performed by: FAMILY MEDICINE

## 2020-01-13 PROCEDURE — 80048 BASIC METABOLIC PNL TOTAL CA: CPT | Performed by: FAMILY MEDICINE

## 2020-01-13 ASSESSMENT — MIFFLIN-ST. JEOR: SCORE: 1817.93

## 2020-01-13 NOTE — PROGRESS NOTES
Subjective     Mahesh Porter is a 59 year old male who presents to clinic today for the following health issues:    HPI   Hyperlipidemia Follow-Up      Are you regularly taking any medication or supplement to lower your cholesterol?   Yes- fenofibrate    Are you having muscle aches or other side effects that you think could be caused by your cholesterol lowering medication?  No    Hypertension Follow-up      Do you check your blood pressure regularly outside of the clinic? No     Are you following a low salt diet? Yes    Are your blood pressures ever more than 140 on the top number (systolic) OR more   than 90 on the bottom number (diastolic), for example 140/90? No      How many servings of fruits and vegetables do you eat daily?  2-3    On average, how many sweetened beverages do you drink each day (Examples: soda, juice, sweet tea, etc.  Do NOT count diet or artificially sweetened beverages)?   1    How many days per week do you miss taking your medication? 0    Hernia        Duration: Ongoing     Description (location/character/radiation): Left side    Intensity:  mild    Accompanying signs and symptoms: Pops out with Bowel Movementw    History (similar episodes/previous evaluation): Yes    Precipitating or alleviating factors: None    Therapies tried and outcome: None       BP Readings from Last 3 Encounters:   01/13/20 118/70   06/17/19 124/80   06/12/19 122/70    Wt Readings from Last 3 Encounters:   01/13/20 98 kg (216 lb)   06/17/19 98 kg (216 lb)   06/12/19 97.5 kg (215 lb)            Hypotestosteronism.  He has been taking the Testosterone injections          Reviewed and updated as needed this visit by Provider  Tobacco  Allergies  Meds  Problems  Med Hx  Surg Hx  Fam Hx         Review of Systems   ROS COMP: CONSTITUTIONAL: NEGATIVE for fever, chills, change in weight  ENT/MOUTH: NEGATIVE for ear, mouth and throat problems  RESP: NEGATIVE for significant cough or SOB  CV: NEGATIVE for chest pain,  "palpitations or peripheral edema  : negative for dysuria, hematuria, decreased urinary stream, erectile dysfunction, positive for and bulging Lt lower groin  PSYCHIATRIC: POSITIVE fordepressed mood and Hx depression      Objective    /70 (Patient Position: Sitting, Cuff Size: Adult Regular)   Pulse 79   Temp 98.1  F (36.7  C) (Tympanic)   Resp 14   Ht 1.797 m (5' 10.75\")   Wt 98 kg (216 lb)   SpO2 96%   BMI 30.34 kg/m    Body mass index is 30.34 kg/m .  Physical Exam   GENERAL: healthy, alert and no distress  NECK: no adenopathy, no asymmetry, masses, or scars and thyroid normal to palpation  RESP: lungs clear to auscultation - no rales, rhonchi or wheezes  CV: regular rate and rhythm, normal S1 S2, no S3 or S4, no murmur, click or rub, no peripheral edema and peripheral pulses strong  ABDOMEN: soft, nontender, no hepatosplenomegaly, no masses and bowel sounds normal   (male): hernia Lt side, reducible, penis normal without urethral discharge and Prosthetic Rt testicle  MS: no gross musculoskeletal defects noted, no edema  PSYCH: mentation appears normal and affect normal/bright    Diagnostic Test Results:  Labs reviewed in Epic  Lab: see below, results pending        Assessment & Plan     Mahesh was seen today for hypertension and lipids.    Diagnoses and all orders for this visit:    Non-recurrent unilateral inguinal hernia without obstruction or gangrene  -     GENERAL SURG ADULT REFERRAL; Future    Hypercholesterolemia  -     Lipid panel reflex to direct LDL Fasting  -     ALT  -     AST    Essential hypertension, benign  -     Basic metabolic panel    Hypogonadism male  -     Testosterone total    Malignant neoplasm of right testis, unspecified whether descended or undescended (H)         BMI:   Estimated body mass index is 30.34 kg/m  as calculated from the following:    Height as of this encounter: 1.797 m (5' 10.75\").    Weight as of this encounter: 98 kg (216 lb).   Weight management plan: " Discussed healthy diet and exercise guidelines        FUTURE APPOINTMENTS:  Refer to [facility] surgery to discuss hernia        - Follow-up visit in 6 mo   Patient Instructions   Work on losing weight      Return in about 6 months (around 7/13/2020) for Hypertension, hypercholesterol, hypotestosteronism.    Tyrone Dunn MD  [facility]

## 2020-01-15 LAB — TESTOST SERPL-MCNC: 159 NG/DL (ref 240–950)

## 2020-02-11 DIAGNOSIS — F33.41 RECURRENT MAJOR DEPRESSIVE DISORDER, IN PARTIAL REMISSION (H): Primary | ICD-10-CM

## 2020-02-11 NOTE — TELEPHONE ENCOUNTER
"Requested Prescriptions   Pending Prescriptions Disp Refills     buPROPion (WELLBUTRIN XL) 300 MG 24 hr tablet  Last Written Prescription Date:  6/17/2019  Last Fill Quantity: na,  # refills: na   Last office visit: 1/13/2020 with prescribing provider:  Mona   Future Office Visit:           Sig: Take 1 tablet (300 mg) by mouth every morning TAKE 1 TABLET(150 MG) BY MOUTH EVERY MORNING       SSRIs Protocol Passed - 2/11/2020  1:48 PM        Passed - Recent (12 mo) or future (30 days) visit within the authorizing provider's specialty     Patient has had an office visit with the authorizing provider or a provider within the authorizing providers department within the previous 12 mos or has a future within next 30 days. See \"Patient Info\" tab in inbasket, or \"Choose Columns\" in Meds & Orders section of the refill encounter.              Passed - Medication is Bupropion     If the medication is Bupropion (Wellbutrin), and the patient is taking for smoking cessation; OK to refill.          Passed - Medication is active on med list        Passed - Patient is age 18 or older           "

## 2020-02-12 RX ORDER — BUPROPION HYDROCHLORIDE 300 MG/1
300 TABLET ORAL EVERY MORNING
Qty: 90 TABLET | Refills: 1 | Status: SHIPPED | OUTPATIENT
Start: 2020-02-12

## 2020-02-24 DIAGNOSIS — N40.1 BENIGN PROSTATIC HYPERPLASIA WITH URINARY HESITANCY: ICD-10-CM

## 2020-02-24 DIAGNOSIS — R39.11 BENIGN PROSTATIC HYPERPLASIA WITH URINARY HESITANCY: ICD-10-CM

## 2020-02-24 DIAGNOSIS — N52.9 ERECTILE DYSFUNCTION OF ORGANIC ORIGIN: ICD-10-CM

## 2020-02-24 NOTE — TELEPHONE ENCOUNTER
"Requested Prescriptions   Pending Prescriptions Disp Refills     tadalafil (CIALIS) 5 MG tablet [Pharmacy Med Name: Tadalafil Oral Tablet 5 MG]  Last Written Prescription Date:  6/12/2019  Last Fill Quantity: 90 tablet,  # refills: 3   Last office visit: 1/13/2020 with prescribing provider:  Mona   Future Office Visit:     90 tablet 0     Sig: TAKE ONE TABLET (5MG) BY MOUTH EVERY 24 HOURS       Erectile Dysfuction Protocol Passed - 2/24/2020 12:29 PM        Passed - Absence of nitrates on medication list        Passed - Absence of Alpha Blockers on Med list        Passed - Recent (12 mo) or future (30 days) visit within the authorizing provider's specialty     Patient has had an office visit with the authorizing provider or a provider within the authorizing providers department within the previous 12 mos or has a future within next 30 days. See \"Patient Info\" tab in inbasket, or \"Choose Columns\" in Meds & Orders section of the refill encounter.              Passed - Medication is active on med list        Passed - Patient is age 18 or older           "

## 2020-02-25 ENCOUNTER — TRANSFERRED RECORDS (OUTPATIENT)
Dept: HEALTH INFORMATION MANAGEMENT | Facility: CLINIC | Age: 60
End: 2020-02-25

## 2020-02-25 RX ORDER — TADALAFIL 5 MG/1
TABLET ORAL
Qty: 90 TABLET | Refills: 3 | Status: SHIPPED | OUTPATIENT
Start: 2020-02-25 | End: 2020-09-21

## 2020-03-01 ENCOUNTER — HEALTH MAINTENANCE LETTER (OUTPATIENT)
Age: 60
End: 2020-03-01

## 2020-03-09 ENCOUNTER — OFFICE VISIT (OUTPATIENT)
Dept: SLEEP MEDICINE | Facility: CLINIC | Age: 60
End: 2020-03-09
Payer: COMMERCIAL

## 2020-03-09 VITALS
BODY MASS INDEX: 28.58 KG/M2 | HEIGHT: 72 IN | DIASTOLIC BLOOD PRESSURE: 77 MMHG | HEART RATE: 69 BPM | OXYGEN SATURATION: 95 % | WEIGHT: 211 LBS | SYSTOLIC BLOOD PRESSURE: 126 MMHG

## 2020-03-09 DIAGNOSIS — G47.52 RBD (REM BEHAVIORAL DISORDER): Primary | ICD-10-CM

## 2020-03-09 PROCEDURE — 99215 OFFICE O/P EST HI 40 MIN: CPT | Performed by: PSYCHIATRY & NEUROLOGY

## 2020-03-09 RX ORDER — LAMOTRIGINE 25 MG/1
50 TABLET ORAL DAILY
Status: ON HOLD | COMMUNITY
End: 2020-11-30

## 2020-03-09 ASSESSMENT — MIFFLIN-ST. JEOR: SCORE: 1815.09

## 2020-03-09 NOTE — PROGRESS NOTES
Follow up visit today to review.     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  Improved now that he is on bupropion instead of SSRIs.  No injurious behaviors.  We do not have PSG confirmation of RBD.      He will continue on melatonin.     Majority of visit was spent in counseling regarding neurodegenerative risk.  He does not describe difficulty with smell; does have occasional constipation and head tremor.  No hallucinations.  This combined with antidepressant medication puts him in a low risk category for imminent (<5 years) conversion.  He was happy to hear this.      I will order PSG to confirm RBD.      Discussed the NAPS trial.  He is interested in enrolling.  I will reach out to Sumit Mckeon to set up visit.      All questions were answered.    It is a great privilege being asked to participate in this patients care.  The patient has been advised on the importance in of never operating operating a motor vehicle while tired or sleepy.        40 minutes were spent with this patient greater than 50% in counsiling and coordination of care.

## 2020-03-09 NOTE — PATIENT INSTRUCTIONS
Your BMI is Body mass index is 28.62 kg/m .  Weight management is a personal decision.  If you are interested in exploring weight loss strategies, the following discussion covers the approaches that may be successful. Body mass index (BMI) is one way to tell whether you are at a healthy weight, overweight, or obese. It measures your weight in relation to your height.  A BMI of 18.5 to 24.9 is in the healthy range. A person with a BMI of 25 to 29.9 is considered overweight, and someone with a BMI of 30 or greater is considered obese. More than two-thirds of American adults are considered overweight or obese.  Being overweight or obese increases the risk for further weight gain. Excess weight may lead to heart disease and diabetes.  Creating and following plans for healthy eating and physical activity may help you improve your health.  Weight control is part of healthy lifestyle and includes exercise, emotional health, and healthy eating habits. Careful eating habits lifelong are the mainstay of weight control. Though there are significant health benefits from weight loss, long-term weight loss with diet alone may be very difficult to achieve- studies show long-term success with dietary management in less than 10% of people. Attaining a healthy weight may be especially difficult to achieve in those with severe obesity. In some cases, medications, devices and surgical management might be considered.  What can you do?  If you are overweight or obese and are interested in methods for weight loss, you should discuss this with your provider.     Consider reducing daily calorie intake by 500 calories.     Keep a food journal.     Avoiding skipping meals, consider cutting portions instead.    Diet combined with exercise helps maintain muscle while optimizing fat loss. Strength training is particularly important for building and maintaining muscle mass. Exercise helps reduce stress, increase energy, and improves fitness.  Increasing exercise without diet control, however, may not burn enough calories to loose weight.       Start walking three days a week 10-20 minutes at a time    Work towards walking thirty minutes five days a week     Eventually, increase the speed of your walking for 1-2 minutes at time    In addition, we recommend that you review healthy lifestyles and methods for weight loss available through the National Institutes of Health patient information sites:  http://win.niddk.nih.gov/publications/index.htm    And look into health and wellness programs that may be available through your health insurance provider, employer, local community center, or cecilio club.    Weight management plan: Patient was referred to their PCP to discuss a diet and exercise plan.

## 2020-04-06 ENCOUNTER — MYC MEDICAL ADVICE (OUTPATIENT)
Dept: FAMILY MEDICINE | Facility: CLINIC | Age: 60
End: 2020-04-06

## 2020-04-06 DIAGNOSIS — N52.9 ERECTILE DYSFUNCTION OF ORGANIC ORIGIN: ICD-10-CM

## 2020-04-06 DIAGNOSIS — E29.1 HYPOGONADISM MALE: Primary | ICD-10-CM

## 2020-04-06 RX ORDER — TESTOSTERONE 1.62 MG/G
2 GEL TRANSDERMAL DAILY
Qty: 75 G | Refills: 3 | Status: SHIPPED | OUTPATIENT
Start: 2020-04-06 | End: 2020-08-07

## 2020-04-06 NOTE — TELEPHONE ENCOUNTER
I started the process, sent Rx for the Androgel to his [facility] pharmacy.  That needs to be attempted to process to trigger the need for PA

## 2020-04-06 NOTE — TELEPHONE ENCOUNTER
Dr. Dunn-    Could we write pt a script for Androgel?  Furthermore, it will need to be followed up by a PA.  Please advise and write for script if advised

## 2020-04-06 NOTE — TELEPHONE ENCOUNTER
Central Prior Authorization Team  Phone: [phone]    PA Initiation    Medication: testosterone (ANDROGEL 1.62 % PUMP) 20.25 MG/ACT gel   Insurance Company: [employer] - Phone [phone] Fax [phone]  Pharmacy Filling the Rx: [id number] [place] [id number] AT [facility] [address]  Filling Pharmacy Phone: [phone]  Filling Pharmacy Fax:    Start Date: 4/6/2020

## 2020-04-14 DIAGNOSIS — E78.5 HYPERLIPIDEMIA LDL GOAL <100: ICD-10-CM

## 2020-04-14 RX ORDER — FENOFIBRATE 145 MG/1
145 TABLET, COATED ORAL DAILY
Qty: 90 TABLET | Refills: 1 | Status: SHIPPED | OUTPATIENT
Start: 2020-04-14 | End: 2021-06-11

## 2020-04-14 NOTE — TELEPHONE ENCOUNTER
"fenofibrate (TRICOR) 145 MG tablet   Last Written Prescription Date:  04/24/2019  Last Fill Quantity: 90,  # refills: 1   Last office visit: 1/13/2020 with prescribing provider:  01/13/2020   Future Office Visit:    Requested Prescriptions   Pending Prescriptions Disp Refills     fenofibrate (TRICOR) 145 MG tablet 90 tablet 1     Sig: Take 1 tablet (145 mg) by mouth daily       Fibrates Passed - 4/14/2020  9:54 AM        Passed - Lipid panel on file in past 12 months     Recent Labs   Lab Test 01/13/20  0745   CHOL 258*   TRIG 351*   HDL 56   *   NHDL 202*               Passed - No abnormal creatine kinase in past 12 months     No lab results found.             Passed - Recent (12 mo) or future (30 days) visit within the authorizing provider's specialty     Patient has had an office visit with the authorizing provider or a provider within the authorizing providers department within the previous 12 mos or has a future within next 30 days. See \"Patient Info\" tab in inbasket, or \"Choose Columns\" in Meds & Orders section of the refill encounter.              Passed - Medication is active on med list        Passed - Patient is age 18 or older             "

## 2020-04-14 NOTE — TELEPHONE ENCOUNTER
Prescription approved per [facility].    Dorcas WICKN, RN, PHN

## 2020-05-13 DIAGNOSIS — E78.00 HYPERCHOLESTEROLEMIA: ICD-10-CM

## 2020-05-13 RX ORDER — ROSUVASTATIN CALCIUM 10 MG/1
5 TABLET, COATED ORAL DAILY
Qty: 45 TABLET | Refills: 2 | Status: SHIPPED | OUTPATIENT
Start: 2020-05-13 | End: 2020-08-07

## 2020-05-27 ENCOUNTER — E-VISIT (OUTPATIENT)
Dept: FAMILY MEDICINE | Facility: CLINIC | Age: 60
End: 2020-05-27
Payer: COMMERCIAL

## 2020-05-27 DIAGNOSIS — J02.9 PHARYNGITIS, UNSPECIFIED ETIOLOGY: Primary | ICD-10-CM

## 2020-05-27 PROCEDURE — 99421 OL DIG E/M SVC 5-10 MIN: CPT | Performed by: FAMILY MEDICINE

## 2020-05-27 RX ORDER — AZITHROMYCIN 250 MG/1
TABLET, FILM COATED ORAL
Qty: 10 TABLET | Refills: 0 | Status: SHIPPED | OUTPATIENT
Start: 2020-05-27 | End: 2020-08-03

## 2020-06-29 DIAGNOSIS — I10 ESSENTIAL HYPERTENSION, BENIGN: ICD-10-CM

## 2020-06-29 RX ORDER — LISINOPRIL 5 MG/1
TABLET ORAL
Qty: 90 TABLET | Refills: 2 | Status: SHIPPED | OUTPATIENT
Start: 2020-06-29 | End: 2021-01-12

## 2020-06-29 NOTE — TELEPHONE ENCOUNTER
Routing refill request to provider for review/approval because:  Labs out of range:  Elevated creat

## 2020-07-21 ENCOUNTER — NURSE TRIAGE (OUTPATIENT)
Dept: FAMILY MEDICINE | Facility: CLINIC | Age: 60
End: 2020-07-21

## 2020-07-21 NOTE — TELEPHONE ENCOUNTER
Patient Contact    Attempt # 1    Was call answered?  No.  Left message on voicemail with information to call triage back.    On call back, triage pt symptoms of dizziness and low blood pressure (per [facility] scheduling request).

## 2020-07-22 NOTE — TELEPHONE ENCOUNTER
Left voice message asking pt to call triage back.    On call back, triage pt symptoms of dizziness and low blood pressure (per [facility] scheduling request).

## 2020-07-23 NOTE — TELEPHONE ENCOUNTER
Patient Contact    Attempt # 3    Was call answered?  No.  Left message on voicemail with information to call me back.    On call back:    -Triage sx

## 2020-08-03 ENCOUNTER — OFFICE VISIT (OUTPATIENT)
Dept: FAMILY MEDICINE | Facility: CLINIC | Age: 60
End: 2020-08-03
Payer: COMMERCIAL

## 2020-08-03 VITALS
HEART RATE: 69 BPM | RESPIRATION RATE: 14 BRPM | TEMPERATURE: 97 F | BODY MASS INDEX: 28.21 KG/M2 | OXYGEN SATURATION: 97 % | SYSTOLIC BLOOD PRESSURE: 110 MMHG | WEIGHT: 208 LBS | DIASTOLIC BLOOD PRESSURE: 80 MMHG

## 2020-08-03 DIAGNOSIS — E78.00 HYPERCHOLESTEROLEMIA: ICD-10-CM

## 2020-08-03 DIAGNOSIS — F33.41 RECURRENT MAJOR DEPRESSIVE DISORDER, IN PARTIAL REMISSION (H): ICD-10-CM

## 2020-08-03 DIAGNOSIS — E29.1 HYPOGONADISM MALE: ICD-10-CM

## 2020-08-03 DIAGNOSIS — I10 ESSENTIAL HYPERTENSION, BENIGN: Primary | ICD-10-CM

## 2020-08-03 LAB
ALBUMIN SERPL-MCNC: 4.1 G/DL (ref 3.4–5)
ALP SERPL-CCNC: 52 U/L (ref 40–150)
ALT SERPL W P-5'-P-CCNC: 34 U/L (ref 0–70)
ANION GAP SERPL CALCULATED.3IONS-SCNC: 7 MMOL/L (ref 3–14)
AST SERPL W P-5'-P-CCNC: 22 U/L (ref 0–45)
BILIRUB SERPL-MCNC: 0.5 MG/DL (ref 0.2–1.3)
BUN SERPL-MCNC: 22 MG/DL (ref 7–30)
CALCIUM SERPL-MCNC: 9 MG/DL (ref 8.5–10.1)
CHLORIDE SERPL-SCNC: 105 MMOL/L (ref 94–109)
CHOLEST SERPL-MCNC: 253 MG/DL
CO2 SERPL-SCNC: 26 MMOL/L (ref 20–32)
CREAT SERPL-MCNC: 1.49 MG/DL (ref 0.66–1.25)
GFR SERPL CREATININE-BSD FRML MDRD: 51 ML/MIN/{1.73_M2}
GLUCOSE SERPL-MCNC: 107 MG/DL (ref 70–99)
HDLC SERPL-MCNC: 56 MG/DL
LDLC SERPL CALC-MCNC: 149 MG/DL
NONHDLC SERPL-MCNC: 197 MG/DL
POTASSIUM SERPL-SCNC: 4.1 MMOL/L (ref 3.4–5.3)
PROT SERPL-MCNC: 7.7 G/DL (ref 6.8–8.8)
SODIUM SERPL-SCNC: 138 MMOL/L (ref 133–144)
TRIGL SERPL-MCNC: 238 MG/DL
TSH SERPL DL<=0.005 MIU/L-ACNC: 1.68 MU/L (ref 0.4–4)

## 2020-08-03 PROCEDURE — 84403 ASSAY OF TOTAL TESTOSTERONE: CPT | Performed by: FAMILY MEDICINE

## 2020-08-03 PROCEDURE — 99214 OFFICE O/P EST MOD 30 MIN: CPT | Performed by: FAMILY MEDICINE

## 2020-08-03 PROCEDURE — 36415 COLL VENOUS BLD VENIPUNCTURE: CPT | Performed by: FAMILY MEDICINE

## 2020-08-03 PROCEDURE — 84443 ASSAY THYROID STIM HORMONE: CPT | Performed by: FAMILY MEDICINE

## 2020-08-03 PROCEDURE — 80061 LIPID PANEL: CPT | Performed by: FAMILY MEDICINE

## 2020-08-03 PROCEDURE — 80053 COMPREHEN METABOLIC PANEL: CPT | Performed by: FAMILY MEDICINE

## 2020-08-03 RX ORDER — PROPRANOLOL HYDROCHLORIDE 80 MG/1
80 CAPSULE, EXTENDED RELEASE ORAL DAILY
Qty: 90 CAPSULE | Refills: 0 | Status: ON HOLD
Start: 2020-08-03 | End: 2020-11-30

## 2020-08-03 RX ORDER — DULOXETIN HYDROCHLORIDE 60 MG/1
CAPSULE, DELAYED RELEASE ORAL
COMMUNITY
Start: 2020-06-30

## 2020-08-03 ASSESSMENT — PATIENT HEALTH QUESTIONNAIRE - PHQ9: SUM OF ALL RESPONSES TO PHQ QUESTIONS 1-9: 9

## 2020-08-03 NOTE — PROGRESS NOTES
Subjective     Mahesh Porter is a 60 year old male who presents to clinic today for the following health issues:    HPI       Hypertension Follow-up      Do you check your blood pressure regularly outside of the clinic? No was checking these until cat chewed through cord    Are you following a low salt diet? No    Are your blood pressures ever more than 140 on the top number (systolic) OR more   than 90 on the bottom number (diastolic), for example 140/90? Unknown    Pt had been taking Lisinopril 10 mg every other day because of last fill the pharmacy did not have the 5 mg tablets.  Now he is cutting tablet in half    Depression and Anxiety Follow-Up    How are you doing with your depression since your last visit? No change    How are you doing with your anxiety since your last visit?  No change    Are you having other symptoms that might be associated with depression or anxiety? No    Have you had a significant life event? Job Concerns     Do you have any concerns with your use of alcohol or other drugs? No    Social History     Tobacco Use     Smoking status: Never Smoker     Smokeless tobacco: Never Used   Substance Use Topics     Alcohol use: Yes     Comment: a drink a day     Drug use: No     PHQ 6/29/2018 12/3/2018 6/10/2019   PHQ-9 Total Score 5 8 6   Q9: Thoughts of better off dead/self-harm past 2 weeks Not at all Not at all Not at all     FELICITAS-7 SCORE 6/29/2018 12/3/2018   Total Score 2 (minimal anxiety) -   Total Score 2 3     Last PHQ-9 1/13/2020   1.  Little interest or pleasure in doing things 1   2.  Feeling down, depressed, or hopeless 1   3.  Trouble falling or staying asleep, or sleeping too much 2   4.  Feeling tired or having little energy 2   5.  Poor appetite or overeating 0   6.  Feeling bad about yourself 1   7.  Trouble concentrating 0   8.  Moving slowly or restless 1   Q9: Thoughts of better off dead/self-harm past 2 weeks -   PHQ-9 Total Score -   Difficulty at work, home, or with people  Somewhat difficult     FELICITAS-7  12/3/2018   1. Feeling nervous, anxious, or on edge 1   2. Not being able to stop or control worrying 0   3. Worrying too much about different things 1   4. Trouble relaxing 0   5. Being so restless that it is hard to sit still 0   6. Becoming easily annoyed or irritable 1   7. Feeling afraid, as if something awful might happen 0   FELICITAS-7 Total Score 3   If you checked any problems, how difficult have they made it for you to do your work, take care of things at home, or get along with other people? Not difficult at all       Suicide Assessment Five-step Evaluation and Treatment (SAFE-T)      How many servings of fruits and vegetables do you eat daily?  4 or more    On average, how many sweetened beverages do you drink each day (Examples: soda, juice, sweet tea, etc.  Do NOT count diet or artificially sweetened beverages)?   1    How many days per week do you exercise enough to make your heart beat faster? 4    How many minutes a day do you exercise enough to make your heart beat faster? 30 - 60    How many days per week do you miss taking your medication? 0    Concern - lightheadedness  Onset: 1 week    Description:   Pt states that he has been noticing when he goes from kneeling to standing he gets lightheaded, 2 weeks ago patient fainted two different times within a few minutes apart and fell down some stairs and hit his face on a plastic planter, very briefly lost consciousness     Intensity: mild, moderate    Progression of Symptoms:  same    Accompanying Signs & Symptoms:  See above    Previous history of similar problem:       Precipitating factors:   Worsened by:     Alleviating factors:  Improved by:     Therapies Tried and outcome: drinking more fluids to stay hydrated  Episode happened when he was not drinking enough, very hot day, working outside        BP Readings from Last 3 Encounters:   08/03/20 110/80   03/09/20 126/77   01/13/20 118/70    Wt Readings from Last 3  Encounters:   08/03/20 94.3 kg (208 lb)   03/09/20 95.7 kg (211 lb)   01/13/20 98 kg (216 lb)                    Reviewed and updated as needed this visit by Provider         Review of Systems   CONSTITUTIONAL: NEGATIVE for fever, chills, change in weight  ENT/MOUTH: NEGATIVE for ear, mouth and throat problems  RESP: NEGATIVE for significant cough or SOB  CV: NEGATIVE for chest pain, palpitations or peripheral edema  : positive for and erectile dysfunction  NEURO: POSITIVE for dizziness/lightheadedness      Objective    /80   Pulse 69   Temp 97  F (36.1  C) (Tympanic)   Resp 14   Wt 94.3 kg (208 lb)   SpO2 97%   BMI 28.21 kg/m    Body mass index is 28.21 kg/m .  Physical Exam   GENERAL: healthy, alert and no distress  NECK: no adenopathy, no asymmetry, masses, or scars and thyroid normal to palpation  RESP: lungs clear to auscultation - no rales, rhonchi or wheezes  CV: regular rate and rhythm, normal S1 S2, no S3 or S4, no murmur, click or rub, no peripheral edema and peripheral pulses strong  ABDOMEN: soft, nontender, no hepatosplenomegaly, no masses and bowel sounds normal  MS: no gross musculoskeletal defects noted, no edema  NEURO: Normal strength and tone, sensory exam grossly normal and mentation intact    Diagnostic Test Results:  Labs reviewed in Epic  Lab: see below, results pending        Assessment & Plan     Mahesh was seen today for hypotension and hypertension.    Diagnoses and all orders for this visit:    Essential hypertension, benign  -     propranolol ER (INDERAL LA) 80 MG 24 hr capsule; Take 1 capsule (80 mg) by mouth daily  -     Comprehensive metabolic panel  -     TSH with free T4 reflex    Recurrent major depressive disorder, in partial remission (H)    Hypogonadism male  -     Testosterone total    Hypercholesterolemia  -     Lipid panel reflex to direct LDL Fasting           FUTURE APPOINTMENTS:       - Follow-up visit in 6 mo  Patient Instructions   Keep well hydrated, avoid  sudden position changes      Return in about 6 months (around 2/3/2021) for Hypertension, Depression.    Tyrone Dunn MD  [facility]

## 2020-08-05 ENCOUNTER — MYC MEDICAL ADVICE (OUTPATIENT)
Dept: FAMILY MEDICINE | Facility: CLINIC | Age: 60
End: 2020-08-05

## 2020-08-05 LAB — TESTOST SERPL-MCNC: 229 NG/DL (ref 240–950)

## 2020-08-07 ENCOUNTER — MYC MEDICAL ADVICE (OUTPATIENT)
Dept: FAMILY MEDICINE | Facility: CLINIC | Age: 60
End: 2020-08-07

## 2020-08-07 DIAGNOSIS — E29.1 HYPOGONADISM MALE: ICD-10-CM

## 2020-08-07 DIAGNOSIS — N52.9 ERECTILE DYSFUNCTION OF ORGANIC ORIGIN: ICD-10-CM

## 2020-08-07 DIAGNOSIS — E78.00 HYPERCHOLESTEROLEMIA: ICD-10-CM

## 2020-08-07 RX ORDER — ROSUVASTATIN CALCIUM 10 MG/1
10 TABLET, COATED ORAL DAILY
Qty: 90 TABLET | Refills: 2 | Status: SHIPPED | OUTPATIENT
Start: 2020-08-07 | End: 2021-01-12

## 2020-08-07 RX ORDER — TESTOSTERONE 1.62 MG/G
3 GEL TRANSDERMAL DAILY
Qty: 125 G | Refills: 3 | Status: SHIPPED | OUTPATIENT
Start: 2020-08-07 | End: 2020-12-16

## 2020-08-07 NOTE — TELEPHONE ENCOUNTER
(3 [employer] message sent today will 3 different requests)  Will close the other two mychart's and add all requests to this one.             -[facility].  -Patient states he is using 4 pumps of medication and on med list there are 2 pumps listed. Does this need to be updated on med list?  -anything further you would like to note about kidney function and glucose per patients request?

## 2020-09-21 DIAGNOSIS — N40.1 BENIGN PROSTATIC HYPERPLASIA WITH URINARY HESITANCY: ICD-10-CM

## 2020-09-21 DIAGNOSIS — R39.11 BENIGN PROSTATIC HYPERPLASIA WITH URINARY HESITANCY: ICD-10-CM

## 2020-09-21 DIAGNOSIS — N52.9 ERECTILE DYSFUNCTION OF ORGANIC ORIGIN: ICD-10-CM

## 2020-09-22 RX ORDER — TADALAFIL 5 MG/1
TABLET ORAL
Qty: 90 TABLET | Refills: 0 | Status: SHIPPED | OUTPATIENT
Start: 2020-09-22 | End: 2020-12-09

## 2020-11-03 DIAGNOSIS — Z11.59 ENCOUNTER FOR SCREENING FOR OTHER VIRAL DISEASES: Primary | ICD-10-CM

## 2020-11-28 DIAGNOSIS — Z11.59 ENCOUNTER FOR SCREENING FOR OTHER VIRAL DISEASES: ICD-10-CM

## 2020-11-28 LAB
SARS-COV-2 RNA SPEC QL NAA+PROBE: NORMAL
SPECIMEN SOURCE: NORMAL

## 2020-11-28 PROCEDURE — U0003 INFECTIOUS AGENT DETECTION BY NUCLEIC ACID (DNA OR RNA); SEVERE ACUTE RESPIRATORY SYNDROME CORONAVIRUS 2 (SARS-COV-2) (CORONAVIRUS DISEASE [COVID-19]), AMPLIFIED PROBE TECHNIQUE, MAKING USE OF HIGH THROUGHPUT TECHNOLOGIES AS DESCRIBED BY CMS-2020-01-R: HCPCS | Performed by: INTERNAL MEDICINE

## 2020-11-29 LAB
LABORATORY COMMENT REPORT: NORMAL
SARS-COV-2 RNA SPEC QL NAA+PROBE: NEGATIVE
SPECIMEN SOURCE: NORMAL

## 2020-11-30 ENCOUNTER — HOSPITAL ENCOUNTER (OUTPATIENT)
Facility: CLINIC | Age: 60
Discharge: HOME | End: 2020-11-30
Attending: INTERNAL MEDICINE | Admitting: INTERNAL MEDICINE
Payer: COMMERCIAL

## 2020-11-30 VITALS
BODY MASS INDEX: 28.17 KG/M2 | HEIGHT: 72 IN | RESPIRATION RATE: 15 BRPM | OXYGEN SATURATION: 92 % | WEIGHT: 208 LBS | HEART RATE: 78 BPM | DIASTOLIC BLOOD PRESSURE: 74 MMHG | SYSTOLIC BLOOD PRESSURE: 120 MMHG | TEMPERATURE: 97.3 F

## 2020-11-30 LAB — COLONOSCOPY: NORMAL

## 2020-11-30 PROCEDURE — G0500 MOD SEDAT ENDO SERVICE >5YRS: HCPCS | Performed by: INTERNAL MEDICINE

## 2020-11-30 PROCEDURE — G0121 COLON CA SCRN NOT HI RSK IND: HCPCS | Performed by: INTERNAL MEDICINE

## 2020-11-30 PROCEDURE — 250N000011 HC RX IP 250 OP 636: Performed by: INTERNAL MEDICINE

## 2020-11-30 PROCEDURE — 45378 DIAGNOSTIC COLONOSCOPY: CPT | Performed by: INTERNAL MEDICINE

## 2020-11-30 PROCEDURE — 258N000003 HC RX IP 258 OP 636: Performed by: INTERNAL MEDICINE

## 2020-11-30 RX ORDER — FENTANYL CITRATE 50 UG/ML
INJECTION, SOLUTION INTRAMUSCULAR; INTRAVENOUS PRN
Status: DISCONTINUED | OUTPATIENT
Start: 2020-11-30 | End: 2020-11-30 | Stop reason: HOSPADM

## 2020-11-30 RX ORDER — VITAMIN B COMPLEX
TABLET ORAL DAILY
COMMUNITY

## 2020-11-30 ASSESSMENT — MIFFLIN-ST. JEOR: SCORE: 1796.48

## 2020-12-08 DIAGNOSIS — N40.1 BENIGN PROSTATIC HYPERPLASIA WITH URINARY HESITANCY: ICD-10-CM

## 2020-12-08 DIAGNOSIS — R39.11 BENIGN PROSTATIC HYPERPLASIA WITH URINARY HESITANCY: ICD-10-CM

## 2020-12-08 DIAGNOSIS — N52.9 ERECTILE DYSFUNCTION OF ORGANIC ORIGIN: ICD-10-CM

## 2020-12-09 RX ORDER — TADALAFIL 5 MG/1
TABLET ORAL
Qty: 90 TABLET | Refills: 1 | Status: SHIPPED | OUTPATIENT
Start: 2020-12-09 | End: 2021-03-22

## 2020-12-14 ENCOUNTER — HEALTH MAINTENANCE LETTER (OUTPATIENT)
Age: 60
End: 2020-12-14

## 2020-12-16 DIAGNOSIS — E29.1 HYPOGONADISM MALE: ICD-10-CM

## 2020-12-16 DIAGNOSIS — N52.9 ERECTILE DYSFUNCTION OF ORGANIC ORIGIN: ICD-10-CM

## 2020-12-16 RX ORDER — TESTOSTERONE 1.62 MG/G
3 GEL TRANSDERMAL DAILY
Qty: 125 G | Refills: 3 | Status: SHIPPED | OUTPATIENT
Start: 2020-12-16 | End: 2021-02-02

## 2020-12-16 NOTE — TELEPHONE ENCOUNTER
Testosterone gel      Last Written Prescription Date:  8/7/2020  Last Fill Quantity: 125g,   # refills: 3  Last Office Visit: 8/3/2020  Future Office visit:       Routing refill request to provider for review/approval because:  Drug not on the FMG, UMP or  Health refill protocol or controlled substance      RX [place] reviewed:  reviewed- no concerns    [place]:  [email or web address]    Last Filled  11/10/2020    Dorcas MALCOLM, RN, PHN

## 2020-12-21 ENCOUNTER — TELEPHONE (OUTPATIENT)
Dept: INTERNAL MEDICINE | Facility: CLINIC | Age: 60
End: 2020-12-21

## 2020-12-21 NOTE — TELEPHONE ENCOUNTER
Prior Authorization Retail Medication Request    Medication/Dose: testosterone (ANDROGEL 1.62 % PUMP) 20.25 MG/ACT gel  ICD code (if different than what is on RX):  E29.1, N52.9  Previously Tried and Failed:    Rationale:      Insurance Name:  [employer]  Insurance ID:  [id number]        Pharmacy Information (if different than what is on RX)  Name:  [facility]  Phone:  [phone]

## 2020-12-22 NOTE — TELEPHONE ENCOUNTER
PRIOR AUTHORIZATION DENIED    Medication: testosterone (ANDROGEL 1.62 % PUMP) 20.25 MG/ACT gel    Denial Date: 12/22/2020    Denial Rational:  Maribel from [facility] called and stated that patient was not compliment when he was using two pumps per day. He was 2 weeks overdue for the most recent fill.         Appeal Information:    If you would like to appeal, please supply P/A team with a letter of medical necessity with clinical reason.

## 2020-12-22 NOTE — TELEPHONE ENCOUNTER
Central Prior Authorization Team   Phone: [phone]      PA Initiation    Medication: testosterone (ANDROGEL 1.62 % PUMP) 20.25 MG/ACT gel  Insurance Company: [employer] - Phone [phone] Fax [phone]  Pharmacy Filling the Rx: [id number] - [place], [place] - [address] AT [address] [place] [address] & [facility]  Filling Pharmacy Phone: [phone]  Filling Pharmacy Fax:    Start Date: 12/22/2020

## 2020-12-23 NOTE — TELEPHONE ENCOUNTER
Pt will need to be seen.  No attempt for trying appeal for PA denial   All other review of systems negative, except as noted in HPI

## 2021-01-12 ENCOUNTER — OFFICE VISIT (OUTPATIENT)
Dept: INTERNAL MEDICINE | Facility: CLINIC | Age: 61
End: 2021-01-12
Payer: COMMERCIAL

## 2021-01-12 VITALS
OXYGEN SATURATION: 98 % | TEMPERATURE: 97.8 F | SYSTOLIC BLOOD PRESSURE: 118 MMHG | WEIGHT: 217.7 LBS | BODY MASS INDEX: 29.53 KG/M2 | DIASTOLIC BLOOD PRESSURE: 80 MMHG | HEART RATE: 77 BPM

## 2021-01-12 DIAGNOSIS — E29.1 HYPOGONADISM MALE: ICD-10-CM

## 2021-01-12 DIAGNOSIS — N18.31 STAGE 3A CHRONIC KIDNEY DISEASE (H): ICD-10-CM

## 2021-01-12 DIAGNOSIS — E78.5 DYSLIPIDEMIA: ICD-10-CM

## 2021-01-12 DIAGNOSIS — F33.41 RECURRENT MAJOR DEPRESSIVE DISORDER, IN PARTIAL REMISSION (H): ICD-10-CM

## 2021-01-12 DIAGNOSIS — R39.12 BENIGN PROSTATIC HYPERPLASIA WITH WEAK URINARY STREAM: Primary | ICD-10-CM

## 2021-01-12 DIAGNOSIS — Z85.47 HISTORY OF TESTICULAR CANCER: ICD-10-CM

## 2021-01-12 DIAGNOSIS — C62.11 MALIGNANT NEOPLASM OF DESCENDED RIGHT TESTIS (H): ICD-10-CM

## 2021-01-12 DIAGNOSIS — N40.1 BENIGN PROSTATIC HYPERPLASIA WITH WEAK URINARY STREAM: Primary | ICD-10-CM

## 2021-01-12 PROBLEM — N18.30 CHRONIC KIDNEY DISEASE, STAGE 3 (H): Status: ACTIVE | Noted: 2021-01-12

## 2021-01-12 LAB
CHOLEST SERPL-MCNC: 220 MG/DL
ERYTHROCYTE [DISTWIDTH] IN BLOOD BY AUTOMATED COUNT: 13.8 % (ref 10–15)
HCT VFR BLD AUTO: 50 % (ref 40–53)
HDLC SERPL-MCNC: 44 MG/DL
HGB BLD-MCNC: 16.1 G/DL (ref 13.3–17.7)
LDLC SERPL CALC-MCNC: 135 MG/DL
MCH RBC QN AUTO: 30.7 PG (ref 26.5–33)
MCHC RBC AUTO-ENTMCNC: 32.2 G/DL (ref 31.5–36.5)
MCV RBC AUTO: 95 FL (ref 78–100)
NONHDLC SERPL-MCNC: 176 MG/DL
PLATELET # BLD AUTO: 260 10E9/L (ref 150–450)
PSA SERPL-ACNC: 1.95 UG/L (ref 0–4)
RBC # BLD AUTO: 5.25 10E12/L (ref 4.4–5.9)
TRIGL SERPL-MCNC: 203 MG/DL
WBC # BLD AUTO: 4.2 10E9/L (ref 4–11)

## 2021-01-12 PROCEDURE — 84153 ASSAY OF PSA TOTAL: CPT | Performed by: INTERNAL MEDICINE

## 2021-01-12 PROCEDURE — 84403 ASSAY OF TOTAL TESTOSTERONE: CPT | Mod: 90 | Performed by: INTERNAL MEDICINE

## 2021-01-12 PROCEDURE — 84270 ASSAY OF SEX HORMONE GLOBUL: CPT | Performed by: INTERNAL MEDICINE

## 2021-01-12 PROCEDURE — 99000 SPECIMEN HANDLING OFFICE-LAB: CPT | Performed by: INTERNAL MEDICINE

## 2021-01-12 PROCEDURE — 80061 LIPID PANEL: CPT | Performed by: INTERNAL MEDICINE

## 2021-01-12 PROCEDURE — 99214 OFFICE O/P EST MOD 30 MIN: CPT | Performed by: INTERNAL MEDICINE

## 2021-01-12 PROCEDURE — 36415 COLL VENOUS BLD VENIPUNCTURE: CPT | Performed by: INTERNAL MEDICINE

## 2021-01-12 PROCEDURE — 85027 COMPLETE CBC AUTOMATED: CPT | Performed by: INTERNAL MEDICINE

## 2021-01-12 RX ORDER — ESZOPICLONE 3 MG/1
3 TABLET, FILM COATED ORAL AT BEDTIME
COMMUNITY
Start: 2020-12-17 | End: 2024-01-17

## 2021-01-12 RX ORDER — DULOXETIN HYDROCHLORIDE 30 MG/1
90 CAPSULE, DELAYED RELEASE ORAL DAILY
COMMUNITY
Start: 2020-12-17 | End: 2024-01-17

## 2021-01-12 RX ORDER — LISINOPRIL 10 MG/1
5 TABLET ORAL DAILY
COMMUNITY
Start: 2020-10-06 | End: 2021-10-15

## 2021-01-12 RX ORDER — ROSUVASTATIN CALCIUM 20 MG/1
TABLET, COATED ORAL
COMMUNITY
Start: 2020-11-10 | End: 2021-07-26

## 2021-01-12 RX ORDER — PROPRANOLOL HYDROCHLORIDE 80 MG/1
80 CAPSULE, EXTENDED RELEASE ORAL EVERY MORNING
COMMUNITY
Start: 2020-12-17 | End: 2021-06-28

## 2021-01-12 RX ORDER — PROPRANOLOL HYDROCHLORIDE 20 MG/1
40 TABLET ORAL 2 TIMES DAILY PRN
COMMUNITY
Start: 2020-08-07

## 2021-01-12 NOTE — PATIENT INSTRUCTIONS
- Call [facility] - [place] [phone] for surgery  - Urology referral placed  - I will send you a message on [employer] when I am able to look at the results of your tests from today

## 2021-01-14 LAB
SHBG SERPL-SCNC: 11 NMOL/L (ref 11–80)
TESTOST FREE SERPL-MCNC: 16.48 NG/DL (ref 4.7–24.4)
TESTOST SERPL-MCNC: 502 NG/DL (ref 240–950)

## 2021-02-01 ENCOUNTER — MYC MEDICAL ADVICE (OUTPATIENT)
Dept: INTERNAL MEDICINE | Facility: CLINIC | Age: 61
End: 2021-02-01

## 2021-02-01 DIAGNOSIS — E29.1 HYPOGONADISM MALE: ICD-10-CM

## 2021-02-01 DIAGNOSIS — N52.9 ERECTILE DYSFUNCTION OF ORGANIC ORIGIN: ICD-10-CM

## 2021-02-02 RX ORDER — TESTOSTERONE 1.62 MG/G
3 GEL TRANSDERMAL DAILY
Qty: 125 G | Refills: 0 | Status: SHIPPED | OUTPATIENT
Start: 2021-02-02 | End: 2021-04-24

## 2021-02-02 NOTE — TELEPHONE ENCOUNTER
One-time rx sent to that [facility]. Will refer to endocrinology to help with any further management of this as this is not a medication I'm familiar with managing like his former PCP was. Encourage him to set-up appt when he returns as they should direct any future refills.

## 2021-03-10 NOTE — TELEPHONE ENCOUNTER
MEDICAL RECORDS REQUEST   [facility]  Urology Clinic  [address] [place] [address]  PHONE: [phone]  Fax: [phone]        FUTURE VISIT INFORMATION                                                   Mahesh Porter, : 1960 scheduled for future visit at [facility]    APPOINTMENT INFORMATION:    Date: 3/22/2021    Provider:  Juanjose VALLECILLO    Reason for Visit/Diagnosis: Hard to urinate     REFERRAL INFORMATION:    Referring provider:  N/A    Specialty: N/A    Referring providers clinic:      Clinic contact number:  N/A    RECORDS REQUESTED FOR VISIT                                                     NOTES  STATUS/DETAILS   OFFICE NOTE from referring provider  yes   OFFICE NOTE from other specialist  no   DISCHARGE SUMMARY from hospital  no   DISCHARGE REPORT from the ER  no   OPERATIVE REPORT  no   MEDICATION LIST  no     PRE-VISIT CHECKLIST      Record collection complete Yes   Appointment appropriately scheduled           (right time/right provider) Yes   MyChart activation Yes   Questionnaire complete If no, please explain: In process      Completed by: Trini Fernandes

## 2021-03-15 ENCOUNTER — PRE VISIT (OUTPATIENT)
Dept: UROLOGY | Facility: CLINIC | Age: 61
End: 2021-03-15

## 2021-03-22 ENCOUNTER — PRE VISIT (OUTPATIENT)
Dept: UROLOGY | Facility: CLINIC | Age: 61
End: 2021-03-22

## 2021-03-22 ENCOUNTER — VIRTUAL VISIT (OUTPATIENT)
Dept: UROLOGY | Facility: CLINIC | Age: 61
End: 2021-03-22
Payer: COMMERCIAL

## 2021-03-22 DIAGNOSIS — R39.11 BENIGN PROSTATIC HYPERPLASIA WITH URINARY HESITANCY: ICD-10-CM

## 2021-03-22 DIAGNOSIS — N52.9 ERECTILE DYSFUNCTION OF ORGANIC ORIGIN: ICD-10-CM

## 2021-03-22 DIAGNOSIS — E34.9 TESTOSTERONE DEFICIENCY: Primary | ICD-10-CM

## 2021-03-22 DIAGNOSIS — N40.1 BENIGN PROSTATIC HYPERPLASIA WITH URINARY HESITANCY: ICD-10-CM

## 2021-03-22 PROCEDURE — 99203 OFFICE O/P NEW LOW 30 MIN: CPT | Mod: 95 | Performed by: NURSE PRACTITIONER

## 2021-03-22 RX ORDER — TADALAFIL 5 MG/1
TABLET ORAL
Qty: 90 TABLET | Refills: 3 | Status: SHIPPED | OUTPATIENT
Start: 2021-03-22 | End: 2022-03-03 | Stop reason: ALTCHOICE

## 2021-03-22 RX ORDER — TAMSULOSIN HYDROCHLORIDE 0.4 MG/1
0.4 CAPSULE ORAL DAILY
Qty: 30 CAPSULE | Refills: 3 | Status: SHIPPED | OUTPATIENT
Start: 2021-03-22 | End: 2021-10-15

## 2021-03-22 NOTE — PROGRESS NOTES
Video Visit Technology for this patient: [employer] Video Visit- Patient was left in waiting room    Chung is a 60 year old who is being evaluated via a billable video visit.      How would you like to obtain your AVS? MyChart  If the video visit is dropped, the invitation should be resent by:   Will anyone else be joining your video visit? No    Video Start Time: 2:31 PM  Video-Visit Details    Type of service:  Video Visit    Video End Time:3:07 PM    Originating Location (pt. Location): Home    Distant Location (provider location):  [facility] [place]     Platform used for Video Visit: [employer]NG-One[employer]in Porter complains of   Chief Complaint   Patient presents with     Consult     urinary hesitancy         Assessment/Plan:  60 year old male with a history of testicular cancer, on testosterone therapy, and BPH with obstructive LUTS (hesitancy and slow stream). Suspect he would benefit from an alpha-blocker, and we discussed this today. He is on a small dose of lisinopril for BP management, so the use of doxazosin as dual therapy was considered, but we ultimately made the shared decision to move forward with Flomax for now.   -Flomax 0.4 mg daily- Rx sent to pharmacy.  -Tadalafil 5 mg daily also refilled.  -Follow up with me in 3 months for an in-person visit to complete TAYLOR, uroflow, and PVR.  -Will plan to follow PSAs yearly.  -Patient to follow up with endocrinology for further management of testosterone replacement.     Gabby Sow, CNP  Department of Urology      Subjective:   60 year old male with a history of testicular cancer, on testosterone therapy chronically due to resultant hypogonadism, as well as BPH with weak stream, who presents for consult regarding LUTS. Was seen by FP a few months ago and reported slow stream. In the context of his health history and testosterone therapy, PSA was checked, and was normal at 1.95. Testosterone levels also checked and within normal  range.     Today, he reports obstructive-type symptoms, including hesitancy and slow stream. Often takes him a long time to empty his bladder. No worsening frequency or nocturia. These symptoms have been present for years but have begun to worsen over the past several months.     Takes tadalafil 5 mg daily for management of ED.     Objective:     Exam:   Patient is a 60 year old male   General Appearance: Well groomed, hygenic  Respiratory: No cough, no respiratory distress or labored breathing  Musculoskeletal: Grossly normal with no gross deficits  Skin: No discoloration or apparent rashes  Neurologic: No tremors  Psychiatric: Alert and oriented  Further examination is deferred due to the nature of our visit.

## 2021-03-22 NOTE — PATIENT INSTRUCTIONS
UROLOGY CLINIC VISIT PATIENT INSTRUCTIONS    -Start Flomax 0.4 mg daily.   -Tadalafil refills sent to mail-order pharmacy.  -Follow up for an in-person visit in 3 months.    If you have any issues, questions or concerns in the meantime, do not hesitate to contact us at [phone] or via [employer].     It was a pleasure meeting with you today.  Thank you for allowing me and my team the privilege of caring for you today.  YOU are the reason we are here, and I truly hope we provided you with the excellent service you deserve.  Please let us know if there is anything else we can do for you so that we can be sure you are leaving completely satisfied with your care experience.    Gabby Sow, CNP

## 2021-03-22 NOTE — LETTER
3/22/2021       RE: Mahesh Porter  [address]  [facility] [address]     Dear Colleague,    Thank you for referring your patient, Mahesh Porter, to the [facility]OLOGY [facility] [place] at [facility]. Please see a copy of my visit note below.    Video Visit Technology for this patient: Anais Video Visit- Patient was left in waiting room    Chung is a 60 year old who is being evaluated via a billable video visit.      How would you like to obtain your AVS? MyChart  If the video visit is dropped, the invitation should be resent by:   Will anyone else be joining your video visit? No    Video Start Time: 2:31 PM  Video-Visit Details    Type of service:  Video Visit    Video End Time:3:07 PM    Originating Location (pt. Location): Home    Distant Location (provider location):  [facility] [place]     Platform used for Video Visit: Anais      Mahesh Porter complains of   Chief Complaint   Patient presents with     Consult     urinary hesitancy         Assessment/Plan:  60 year old male with a history of testicular cancer, on testosterone therapy, and BPH with obstructive LUTS (hesitancy and slow stream). Suspect he would benefit from an alpha-blocker, and we discussed this today. He is on a small dose of lisinopril for BP management, so the use of doxazosin as dual therapy was considered, but we ultimately made the shared decision to move forward with Flomax for now.   -Flomax 0.4 mg daily- Rx sent to pharmacy.  -Tadalafil 5 mg daily also refilled.  -Follow up with me in 3 months for an in-person visit to complete TAYLOR, uroflow, and PVR.  -Will plan to follow PSAs yearly.  -Patient to follow up with endocrinology for further management of testosterone replacement.     Gabby Sow, CNP  Department of Urology      Subjective:   60 year old male with a history of testicular cancer, on testosterone therapy chronically due to  resultant hypogonadism, as well as BPH with weak stream, who presents for consult regarding LUTS. Was seen by FP a few months ago and reported slow stream. In the context of his health history and testosterone therapy, PSA was checked, and was normal at 1.95. Testosterone levels also checked and within normal range.     Today, he reports obstructive-type symptoms, including hesitancy and slow stream. Often takes him a long time to empty his bladder. No worsening frequency or nocturia. These symptoms have been present for years but have begun to worsen over the past several months.     Takes tadalafil 5 mg daily for management of ED.     Objective:     Exam:   Patient is a 60 year old male   General Appearance: Well groomed, hygenic  Respiratory: No cough, no respiratory distress or labored breathing  Musculoskeletal: Grossly normal with no gross deficits  Skin: No discoloration or apparent rashes  Neurologic: No tremors  Psychiatric: Alert and oriented  Further examination is deferred due to the nature of our visit.

## 2021-03-23 ENCOUNTER — TELEPHONE (OUTPATIENT)
Dept: ENDOCRINOLOGY | Facility: CLINIC | Age: 61
End: 2021-03-23

## 2021-03-23 NOTE — TELEPHONE ENCOUNTER
CLINIC COORDINATOR SCHEDULING NOTES    CALL RESULT: MyChart sent    APPT TYPE: VIRTUAL VISIT NEW (or [facility] NEW ENDOCRINE if on designated FTF day and patient prefers)    PROVIDER: any provider accepting new endocrine patients    DATE APPT NEEDED: 1st available

## 2021-03-23 NOTE — TELEPHONE ENCOUNTER
Referred for Testosterone deficiency [E34.9] per Gabby Sow. Please triage for appropriate scheduling timeframe as 1st available is over 2 weeks.

## 2021-03-23 NOTE — TELEPHONE ENCOUNTER
Mahesh is already on testosterone placement . First available even if  3-4 weeks out is appropriate . Leonie Donahue RN on 3/23/2021 at 12:21 PM

## 2021-04-08 NOTE — TELEPHONE ENCOUNTER
RECORDS RECEIVED FROM: internal    DATE RECEIVED: 4.24.21    NOTES (FOR ALL VISITS) STATUS DETAILS   OFFICE NOTES from referring provider internal  Gabby Sow   OFFICE NOTES from other specialist internal  2.1.21 Marilia SUERO  8.3.20 Mona KAISER  6/17/19 Grant QUIJANO     ED NOTES na    OPERATIVE REPORT  (thyroid, pituitary, adrenal, parathyroid) na    MEDICATION LIST internal     IMAGING      DEXASCAN na    MRI (BRAIN) na    XR (Chest) na    CT (HEAD/NECK/CHEST/ABDOMEN) na    NUCLEAR  na    ULTRASOUND (HEAD/NECK) na    LABS     DIABETES: HBGA1C, CREATININE, FASTING LIPIDS, MICROALBUMIN URINE, POTASSIUM, TSH, T4    THYROID: TSH, T4, CBC, THYRODLONULIN, TOTAL T3, FREE T4, CALCITONIN, CEA internal  Cbc- 1.12.21  LIPIDS 8/3/20  Testosterone Free and Total 1.12.21   TSH, T4 8/3/20

## 2021-04-17 ENCOUNTER — HEALTH MAINTENANCE LETTER (OUTPATIENT)
Age: 61
End: 2021-04-17

## 2021-04-23 NOTE — PROGRESS NOTES
Chung is a 60 year old who is being evaluated via a billable video visit.      How would you like to obtain your AVS? [employer]    Text video visit link to: [phone]    Will anyone else be joining your video visit? Kathy GÓMEZ MA

## 2021-04-24 ENCOUNTER — PRE VISIT (OUTPATIENT)
Dept: ENDOCRINOLOGY | Facility: CLINIC | Age: 61
End: 2021-04-24

## 2021-04-24 ENCOUNTER — VIRTUAL VISIT (OUTPATIENT)
Dept: ENDOCRINOLOGY | Facility: CLINIC | Age: 61
End: 2021-04-24
Attending: NURSE PRACTITIONER
Payer: COMMERCIAL

## 2021-04-24 DIAGNOSIS — N18.31 STAGE 3A CHRONIC KIDNEY DISEASE (H): ICD-10-CM

## 2021-04-24 DIAGNOSIS — E29.1 MALE HYPOGONADISM: Primary | ICD-10-CM

## 2021-04-24 DIAGNOSIS — E29.1 HYPOGONADISM MALE: ICD-10-CM

## 2021-04-24 PROCEDURE — 99204 OFFICE O/P NEW MOD 45 MIN: CPT | Mod: 95

## 2021-04-24 RX ORDER — TESTOSTERONE 1.62 MG/G
2 GEL TRANSDERMAL DAILY
Qty: 75 G | Refills: 5 | Status: SHIPPED | OUTPATIENT
Start: 2021-04-24 | End: 2021-12-13

## 2021-04-24 NOTE — LETTER
4/24/2021       RE: Mahesh Porter  [address]  [facility] [address]     Dear Colleague,    Thank you for referring your patient, Mahesh Porter, to the [facility] ENDOCRINOLOGY [facility]IN[facility] [place] at [facility]. Please see a copy of my visit note below.    Chung is a 60 year old who is being evaluated via a billable video visit.      How would you like to obtain your AVS? Lookout video visit link to: [phone]    Will anyone else be joining your video visit? No    Mecca GÓMEZ MA          Endocrinology virtual Visit    Chief Complaint: Video Visit (Hypogonadism)     Information obtained from:Patient      Assessment/Treatment Plan:      Primary hypogonadism secondary to history of orchiectomy -currently on testosterone gel.  Has been using AndroGel 1.62% pump 3 pumps onto the skin daily.  Target testosterone 300-400's.  Will reduce testosterone gel to 2 pumps daily.  Will check testosterone levels and other labs in 2-3 months.    Complications monitoring from testosterone replacement standpoint  History of mild obstructive sleep apnea-clinically stable  Blood pressure controlled  Currently on a statin treatment and last LDL within acceptable range  Liver function test results stable  PSA stable-last checked in January 2021.  Hematocrit stable.    Refill for six months provided today.     Patient Instructions     Chung,    -Reduce testosterone/AndroGel 1.62% pump -2 pumps onto the skin daily from current 3 pumps per day.  -Check blood work for the following labs in June 2021.  Please make laboratory appointment by calling the number below.  Orders Placed This Encounter   Procedures     CBC with platelets     Testosterone total     Hemoglobin A1c     Comprehensive metabolic panel     Lab scheduling to schedule at any [place] lab locations: [phone] select option [phone].         I will contact the patient with the test results.  Return to clinic in 6  months.    Test and/or medications prescribed today:  Orders Placed This Encounter   Procedures     CBC with platelets     Testosterone total     Hemoglobin A1c     Comprehensive metabolic panel         Jett Tirado MD  Staff Endocrinologist    Division of Endocrinology and Diabetes      Subjective:         HPI: Mahesh Porter is a 60 year old male with history of hypogonadism who is seen in consultation at Gabby Sow's request for the same.    History of testicular cancer status post unilateral orchiectomy in 2006.  Developed hypogonadism following orchiectomy and has been on replacement therapy with testosterone over the last 10 years.  Currently using testosterone gel-AndroGel 1.62% 3 pumps per day.  Last testosterone level was 502 on this dose.  Hematocrit was 50.  PSA within normal limits.  Blood pressure well controlled.  Has history of mild obstructive sleep apnea.   He is currently following with neurologist for a sleep disorder.  Currently on Lunesta and melatonin.    History of hyperlipidemia currently on statin.  Last LDL was 135.  Has been having neck pain for which he is scheduled for a steroid shot.  Currently on gabapentin for pain control which caused erectile dysfunction.      Allergies   Allergen Reactions     Paroxetine Other (See Comments)     And ED     Penicillins Rash       Current Outpatient Medications   Medication Sig Dispense Refill     testosterone (ANDROGEL 1.62 % PUMP) 20.25 MG/ACT gel Place 2 Pump onto the skin daily Apply from dispenser to clean, dry, intact skin of the upper arms and shoulders. 75 g 5     buPROPion (WELLBUTRIN XL) 300 MG 24 hr tablet Take 1 tablet (300 mg) by mouth every morning TAKE 1 TABLET(150 MG) BY MOUTH EVERY MORNING 90 tablet 1     DULoxetine (CYMBALTA) 30 MG capsule Take 90 mg by mouth daily       DULoxetine (CYMBALTA) 60 MG capsule TK 1 C PO QD       eszopiclone (LUNESTA) 3 MG tablet Take 3 mg by mouth At Bedtime       fenofibrate (TRICOR)  "145 MG tablet Take 1 tablet (145 mg) by mouth daily 90 tablet 1     lisinopril (ZESTRIL) 10 MG tablet Take 5 mg by mouth daily       needle, disp, 18G X 1\" MISC Use to draw up hormones 14 each 3     Needle, Disp, 25G X 1-1/2\" MISC Use for administering hormone IM 14 each 3     Probiotic Product (PROBIOTIC & ACIDOPHILUS EX ST) CAPS Take 1 capsule by mouth       propranolol (INDERAL) 20 MG tablet Take 20 mg by mouth 2 times daily as needed       propranolol ER (INDERAL LA) 80 MG 24 hr capsule Take 80 mg by mouth every morning       rosuvastatin (CRESTOR) 20 MG tablet TK SS T PO D       syringe, disposable, 1 ML MISC Use  to draw up hormones 14 each 3     tadalafil (CIALIS) 5 MG tablet TAKE 1 TABLET (5MG) BY MOUTH EVERY 24 HOURS 90 tablet 3     tamsulosin (FLOMAX) 0.4 MG capsule Take 1 capsule (0.4 mg) by mouth daily 30 capsule 3     Vitamin D3 (CHOLECALCIFEROL) 25 mcg (1000 units) tablet Take by mouth daily         Review of Systems     as per HPI above    Past Medical History:   Diagnosis Date     Cancer (H) 2006    Testicular cancer     Hyperlipidemia      Hypertension      Rosacea 4/30/2014       Past Surgical History:   Procedure Laterality Date     COLONOSCOPY N/A 11/30/2020    Procedure: COLONOSCOPY;  Surgeon: Glen Vela MD;  Location:  GI     GENITOURINARY SURGERY Right 2006    Rt testicle removed for testicular cancer         Objective:     ENDO VITALS-UMP 1/12/2021   Weight 98.748 kg   Weight 217 lb 11.2 oz   Height    /80   Pulse 77   Resp    BSA (Calculated - sq m)    BMI (Calculated)    Temp 97.8   Temp src Temporal   SpO2 98   GENERAL: Healthy, alert and no distress  EYES: Eyes grossly normal to inspection.   RESP: No audible wheeze, cough, or visible cyanosis.   SKIN: Visible skin clear.   NEURO: Alert and oriented.    PSYCH: Mentation appears normal, affect normal/bright, judgement and insight intact, normal speech.    In House Labs:       TSH   Date Value Ref Range Status "   08/03/2020 1.68 0.40 - 4.00 mU/L Final   07/06/2018 0.93 0.40 - 4.00 mU/L Final       Creatinine   Date Value Ref Range Status   08/03/2020 1.49 (H) 0.66 - 1.25 mg/dL Final   ]    Recent Labs   Lab Test 01/12/21  0807 08/03/20  0746   CHOL 220* 253*   HDL 44 56   * 149*   TRIG 203* 238*     PSA   Date Value Ref Range Status   01/12/2021 1.95 0 - 4 ug/L Final     Comment:     Assay Method:  Chemiluminescence using Siemens Vista analyzer     Lab Results   Component Value Date    WBC 4.2 01/12/2021     Lab Results   Component Value Date    RBC 5.25 01/12/2021     Lab Results   Component Value Date    HGB 16.1 01/12/2021     Lab Results   Component Value Date    HCT 50.0 01/12/2021     No components found for: MCT  Lab Results   Component Value Date    MCV 95 01/12/2021     Lab Results   Component Value Date    MCH 30.7 01/12/2021     Lab Results   Component Value Date    MCHC 32.2 01/12/2021     Lab Results   Component Value Date    RDW 13.8 01/12/2021     Lab Results   Component Value Date     01/12/2021       Video-Visit Details    Type of service:  Video Visit  All times are in your local time  1st VideoStart: 04/24/2021 09:58 am  Stop: 04/24/2021 10:27 am  Originating Location (pt. Location): Home  Distant Location (provider location):  [facility]   Platform used for Video Visit: [facility]  45 minutes spent on the date of the encounter doing chart review, history and exam, documentation and further activities per the note.

## 2021-04-24 NOTE — PROGRESS NOTES
Endocrinology virtual Visit    Chief Complaint: Video Visit (Hypogonadism)     Information obtained from:Patient      Assessment/Treatment Plan:      Primary hypogonadism secondary to history of orchiectomy -currently on testosterone gel.  Has been using AndroGel 1.62% pump 3 pumps onto the skin daily.  Target testosterone 300-400's.  Will reduce testosterone gel to 2 pumps daily.  Will check testosterone levels and other labs in 2-3 months.    Complications monitoring from testosterone replacement standpoint  History of mild obstructive sleep apnea-clinically stable  Blood pressure controlled  Currently on a statin treatment and last LDL within acceptable range  Liver function test results stable  PSA stable-last checked in January 2021.  Hematocrit stable.    Refill for six months provided today.     Patient Instructions     Chung,    -Reduce testosterone/AndroGel 1.62% pump -2 pumps onto the skin daily from current 3 pumps per day.  -Check blood work for the following labs in June 2021.  Please make laboratory appointment by calling the number below.  Orders Placed This Encounter   Procedures     CBC with platelets     Testosterone total     Hemoglobin A1c     Comprehensive metabolic panel     Lab scheduling to schedule at any [place] lab locations: [phone] select option 1.         I will contact the patient with the test results.  Return to clinic in 6 months.    Test and/or medications prescribed today:  Orders Placed This Encounter   Procedures     CBC with platelets     Testosterone total     Hemoglobin A1c     Comprehensive metabolic panel         Jett Tirado MD  Staff Endocrinologist    Division of Endocrinology and Diabetes      Subjective:         HPI: Mahesh Porter is a 60 year old male with history of hypogonadism who is seen in consultation at Gabby Sow's request for the same.    History of testicular cancer status post unilateral orchiectomy in 2006.  Developed hypogonadism  "following orchiectomy and has been on replacement therapy with testosterone over the last 10 years.  Currently using testosterone gel-AndroGel 1.62% 3 pumps per day.  Last testosterone level was 502 on this dose.  Hematocrit was 50.  PSA within normal limits.  Blood pressure well controlled.  Has history of mild obstructive sleep apnea.   He is currently following with neurologist for a sleep disorder.  Currently on Lunesta and melatonin.    History of hyperlipidemia currently on statin.  Last LDL was 135.  Has been having neck pain for which he is scheduled for a steroid shot.  Currently on gabapentin for pain control which caused erectile dysfunction.      Allergies   Allergen Reactions     Paroxetine Other (See Comments)     And ED     Penicillins Rash       Current Outpatient Medications   Medication Sig Dispense Refill     testosterone (ANDROGEL 1.62 % PUMP) 20.25 MG/ACT gel Place 2 Pump onto the skin daily Apply from dispenser to clean, dry, intact skin of the upper arms and shoulders. 75 g 5     buPROPion (WELLBUTRIN XL) 300 MG 24 hr tablet Take 1 tablet (300 mg) by mouth every morning TAKE 1 TABLET(150 MG) BY MOUTH EVERY MORNING 90 tablet 1     DULoxetine (CYMBALTA) 30 MG capsule Take 90 mg by mouth daily       DULoxetine (CYMBALTA) 60 MG capsule TK 1 C PO QD       eszopiclone (LUNESTA) 3 MG tablet Take 3 mg by mouth At Bedtime       fenofibrate (TRICOR) 145 MG tablet Take 1 tablet (145 mg) by mouth daily 90 tablet 1     lisinopril (ZESTRIL) 10 MG tablet Take 5 mg by mouth daily       needle, disp, 18G X 1\" MISC Use to draw up hormones 14 each 3     Needle, Disp, 25G X 1-1/2\" MISC Use for administering hormone IM 14 each 3     Probiotic Product (PROBIOTIC & ACIDOPHILUS EX ST) CAPS Take 1 capsule by mouth       propranolol (INDERAL) 20 MG tablet Take 20 mg by mouth 2 times daily as needed       propranolol ER (INDERAL LA) 80 MG 24 hr capsule Take 80 mg by mouth every morning       rosuvastatin (CRESTOR) 20 MG " tablet TK SS T PO D       syringe, disposable, 1 ML MISC Use  to draw up hormones 14 each 3     tadalafil (CIALIS) 5 MG tablet TAKE 1 TABLET (5MG) BY MOUTH EVERY 24 HOURS 90 tablet 3     tamsulosin (FLOMAX) 0.4 MG capsule Take 1 capsule (0.4 mg) by mouth daily 30 capsule 3     Vitamin D3 (CHOLECALCIFEROL) 25 mcg (1000 units) tablet Take by mouth daily         Review of Systems     as per HPI above    Past Medical History:   Diagnosis Date     Cancer (H) 2006    Testicular cancer     Hyperlipidemia      Hypertension      Rosacea 4/30/2014       Past Surgical History:   Procedure Laterality Date     COLONOSCOPY N/A 11/30/2020    Procedure: COLONOSCOPY;  Surgeon: Glen Vela MD;  Location:  GI     GENITOURINARY SURGERY Right 2006    Rt testicle removed for testicular cancer         Objective:     ENDO VITALS-UMP 1/12/2021   Weight 98.748 kg   Weight 217 lb 11.2 oz   Height    /80   Pulse 77   Resp    BSA (Calculated - sq m)    BMI (Calculated)    Temp 97.8   Temp src Temporal   SpO2 98   GENERAL: Healthy, alert and no distress  EYES: Eyes grossly normal to inspection.   RESP: No audible wheeze, cough, or visible cyanosis.   SKIN: Visible skin clear.   NEURO: Alert and oriented.    PSYCH: Mentation appears normal, affect normal/bright, judgement and insight intact, normal speech.    In House Labs:       TSH   Date Value Ref Range Status   08/03/2020 1.68 0.40 - 4.00 mU/L Final   07/06/2018 0.93 0.40 - 4.00 mU/L Final       Creatinine   Date Value Ref Range Status   08/03/2020 1.49 (H) 0.66 - 1.25 mg/dL Final   ]    Recent Labs   Lab Test 01/12/21  0807 08/03/20  0746   CHOL 220* 253*   HDL 44 56   * 149*   TRIG 203* 238*     PSA   Date Value Ref Range Status   01/12/2021 1.95 0 - 4 ug/L Final     Comment:     Assay Method:  Chemiluminescence using Siemens Vista analyzer     Lab Results   Component Value Date    WBC 4.2 01/12/2021     Lab Results   Component Value Date    RBC 5.25 01/12/2021      Lab Results   Component Value Date    HGB 16.1 01/12/2021     Lab Results   Component Value Date    HCT 50.0 01/12/2021     No components found for: MCT  Lab Results   Component Value Date    MCV 95 01/12/2021     Lab Results   Component Value Date    MCH 30.7 01/12/2021     Lab Results   Component Value Date    MCHC 32.2 01/12/2021     Lab Results   Component Value Date    RDW 13.8 01/12/2021     Lab Results   Component Value Date     01/12/2021       Video-Visit Details    Type of service:  Video Visit  All times are in your local time  1st VideoStart: 04/24/2021 09:58 am  Stop: 04/24/2021 10:27 am  Originating Location (pt. Location): Home  Distant Location (provider location):  [facility]   Platform used for Video Visit: [employer]  45 minutes spent on the date of the encounter doing chart review, history and exam, documentation and further activities per the note.

## 2021-04-24 NOTE — PATIENT INSTRUCTIONS
Chung,    -Reduce testosterone/AndroGel 1.62% pump -2 pumps onto the skin daily from current 3 pumps per day.  -Check blood work for the following labs in June 2021.  Please make laboratory appointment by calling the number below.  Orders Placed This Encounter   Procedures     CBC with platelets     Testosterone total     Hemoglobin A1c     Comprehensive metabolic panel     Lab scheduling to schedule at any [place] lab locations: [phone] select option 1.

## 2021-05-03 ENCOUNTER — TELEPHONE (OUTPATIENT)
Dept: ENDOCRINOLOGY | Facility: CLINIC | Age: 61
End: 2021-05-03

## 2021-05-03 NOTE — TELEPHONE ENCOUNTER
Prior Authorization Retail Medication Request    Medication/Dose: testosterone (ANDROGEL 1.62 % PUMP) 20.25 MG/ACT gel  ICD code (if different than what is on RX):E29.1  Rationale:patient needs for hypogonadism     Insurance Name:[facility] [place]   Insurance ID:[id number]       Pharmacy Information (if different than what is on RX)  Name:    Phone:

## 2021-05-04 NOTE — TELEPHONE ENCOUNTER
PA Initiation    Medication: testosterone (ANDROGEL 1.62 % PUMP) 20.25 MG/ACT gel -   Insurance Company: [employer] - Phone [phone] Fax [phone]  Pharmacy Filling the Rx: [id number] [place] [address] AT [facility] [address]  Filling Pharmacy Phone: [phone]  Filling Pharmacy Fax: [phone]  Start Date: 5/4/2021

## 2021-05-11 RX ORDER — LISINOPRIL 10 MG/1
5 TABLET ORAL DAILY
OUTPATIENT
Start: 2021-05-11

## 2021-05-27 ENCOUNTER — TELEPHONE (OUTPATIENT)
Dept: PEDIATRICS | Facility: CLINIC | Age: 61
End: 2021-05-27

## 2021-06-11 DIAGNOSIS — E78.5 HYPERLIPIDEMIA LDL GOAL <100: ICD-10-CM

## 2021-06-11 RX ORDER — FENOFIBRATE 145 MG/1
TABLET, COATED ORAL
Qty: 90 TABLET | Refills: 1 | Status: SHIPPED | OUTPATIENT
Start: 2021-06-11 | End: 2024-01-17

## 2021-06-25 ENCOUNTER — PRE VISIT (OUTPATIENT)
Dept: UROLOGY | Facility: CLINIC | Age: 61
End: 2021-06-25

## 2021-06-25 NOTE — TELEPHONE ENCOUNTER
Reason for Visit: Follow up    Diagnosis: BPH    Orders/Procedures/Records: in system    Contact Patient: n/a    Rooming Requirements: uroflow and PVR      Paty Rosales LPN  06/25/21  10:41 AM

## 2021-06-28 NOTE — PROGRESS NOTES
"        Chief Complaint:   BPH with LUTS         History of Present Illness:    Mahesh Porter is a 61 year old male with a history of testicular cancer, on testosterone therapy chronically due to resultant hypogonadism, as well as BPH with weak stream, who presents for follow up regarding LUTS. I last saw him virtually on 3/22/21. In the context of his health history and testosterone therapy, his PSA was checked by his PCP, and was normal at 1.95. Testosterone levels also checked and within normal range. At our visit, he reported obstructive-type symptoms, including hesitancy and slow stream. He was started on Flomax 0.4 mg daily. Daily tadalafil 5 mg also refilled.     He has not yet trialed the Flomax consistently. He notes that he is on several other medications and struggles with polypharmacy. Would like to reduce the number of medications he is taking.          Past Medical History:     Past Medical History:   Diagnosis Date     Cancer (H) 2006    Testicular cancer     Hyperlipidemia      Hypertension      Rosacea 4/30/2014            Past Surgical History:     Past Surgical History:   Procedure Laterality Date     COLONOSCOPY N/A 11/30/2020    Procedure: COLONOSCOPY;  Surgeon: Glen Vela MD;  Location:  GI     GENITOURINARY SURGERY Right 2006    Rt testicle removed for testicular cancer            Medications     Current Outpatient Medications   Medication     buPROPion (WELLBUTRIN XL) 300 MG 24 hr tablet     DULoxetine (CYMBALTA) 30 MG capsule     DULoxetine (CYMBALTA) 60 MG capsule     eszopiclone (LUNESTA) 3 MG tablet     fenofibrate (TRICOR) 145 MG tablet     lisinopril (ZESTRIL) 10 MG tablet     needle, disp, 18G X 1\" MISC     Needle, Disp, 25G X 1-1/2\" MISC     Probiotic Product (PROBIOTIC & ACIDOPHILUS EX ST) CAPS     propranolol (INDERAL) 20 MG tablet     rosuvastatin (CRESTOR) 20 MG tablet     syringe, disposable, 1 ML MISC     tadalafil (CIALIS) 5 MG tablet     tamsulosin (FLOMAX) 0.4 " MG capsule     testosterone (ANDROGEL 1.62 % PUMP) 20.25 MG/ACT gel     Vitamin D3 (CHOLECALCIFEROL) 25 mcg (1000 units) tablet     No current facility-administered medications for this visit.             Family History:     Family History   Problem Relation Age of Onset     Lupus Mother      Childhood Heart Disease Father      Parkinsonism Father      Skin Cancer No family hx of      Melanoma No family hx of             Social History:     Social History     Socioeconomic History     Marital status:      Spouse name: Not on file     Number of children: Not on file     Years of education: Not on file     Highest education level: Not on file   Occupational History     Not on file   Social Needs     Financial resource strain: Not on file     Food insecurity     Worry: Not on file     Inability: Not on file     Transportation needs     Medical: Not on file     Non-medical: Not on file   Tobacco Use     Smoking status: Never Smoker     Smokeless tobacco: Never Used   Substance and Sexual Activity     Alcohol use: Yes     Comment: a drink a day     Drug use: No     Sexual activity: Yes     Partners: Female   Lifestyle     Physical activity     Days per week: Not on file     Minutes per session: Not on file     Stress: Not on file   Relationships     Social connections     Talks on phone: Not on file     Gets together: Not on file     Attends Episcopal service: Not on file     Active member of club or organization: Not on file     Attends meetings of clubs or organizations: Not on file     Relationship status: Not on file     Intimate partner violence     Fear of current or ex partner: Not on file     Emotionally abused: Not on file     Physically abused: Not on file     Forced sexual activity: Not on file   Other Topics Concern     Parent/sibling w/ CABG, MI or angioplasty before 65F 55M? Not Asked   Social History Narrative     Not on file            Allergies:   Paroxetine and Penicillins         Review of  Systems:  From intake questionnaire   Negative 14 system review except as noted on HPI, nurse's note.         Physical Exam:   Patient is a 61 year old  male   Vitals: Blood pressure 132/87, pulse 85.  General Appearance Adult: Alert, no acute distress, oriented  Lungs: no respiratory distress, or pursed lip breathing  Heart: No obvious jugular venous distension present  Abdomen: soft, nontender, no organomegaly or masses, There is no height or weight on file to calculate BMI.  : TAYLOR anodular, symmetric; moderate enlargement noted.      Uroflow and Post-Void Residual by Bladder Scan     PVR: 30 mL      Labs and Pathology:    I personally reviewed all applicable laboratory data and went over findings with patient  Significant for:    CBC RESULTS:  Recent Labs   Lab Test 01/12/21  0807 07/06/18  0747   WBC 4.2 8.9   HGB 16.1 14.1    274        BMP RESULTS:  Recent Labs   Lab Test 08/03/20  0746 01/13/20  0745 04/15/19  1120 07/06/18  0747    138 138 143   POTASSIUM 4.1 4.1 4.5 4.6   CHLORIDE 105 105 104 108   CO2 26 29 28 26   ANIONGAP 7 4 6 9   * 103* 111* 117*   BUN 22 21 24 16   CR 1.49* 1.46* 1.28* 1.32*   GFRESTIMATED 51* 52* 61 56*   GFRESTBLACK 59* 60* 71 68   ADAIR 9.0 9.0 9.2 8.2*       PSA RESULTS  PSA   Date Value Ref Range Status   01/12/2021 1.95 0 - 4 ug/L Final     Comment:     Assay Method:  Chemiluminescence using Siemens Vista analyzer   07/06/2018 2.81 0 - 4 ug/L Final     Comment:     Assay Method:  Chemiluminescence using Siemens Vista analyzer            Assessment and Plan:     Assessment: 61 year old male with a history of testicular cancer, on testosterone therapy chronically due to resultant hypogonadism, as well as BPH with weak stream, who has continued to experience LUTS. Moderate prostatomegaly on TAYLOR. PVR low today at 30 mL. Has not yet trialed Flomax consistently, partly out of concern for polypharmacy. However, he will plan to trial this now.     Plan:  -Trial the  Flomax.  -Follow up with me in 3 months via virtual visit  -Will plan to recheck PSA in 6 months (December-January)  -Pharmacy consult placed to review his medications with him.       Gabby Sow CNP  Department of Urology

## 2021-06-29 ENCOUNTER — OFFICE VISIT (OUTPATIENT)
Dept: UROLOGY | Facility: CLINIC | Age: 61
End: 2021-06-29
Payer: COMMERCIAL

## 2021-06-29 VITALS — HEART RATE: 85 BPM | SYSTOLIC BLOOD PRESSURE: 132 MMHG | DIASTOLIC BLOOD PRESSURE: 87 MMHG

## 2021-06-29 DIAGNOSIS — N40.1 BENIGN PROSTATIC HYPERPLASIA WITH URINARY HESITANCY: Primary | ICD-10-CM

## 2021-06-29 DIAGNOSIS — R39.11 BENIGN PROSTATIC HYPERPLASIA WITH URINARY HESITANCY: Primary | ICD-10-CM

## 2021-06-29 DIAGNOSIS — Z79.899 POLYPHARMACY: ICD-10-CM

## 2021-06-29 DIAGNOSIS — E34.9 TESTOSTERONE DEFICIENCY: ICD-10-CM

## 2021-06-29 PROCEDURE — 51798 US URINE CAPACITY MEASURE: CPT | Performed by: NURSE PRACTITIONER

## 2021-06-29 PROCEDURE — 99213 OFFICE O/P EST LOW 20 MIN: CPT | Mod: 25 | Performed by: NURSE PRACTITIONER

## 2021-06-29 ASSESSMENT — PAIN SCALES - GENERAL: PAINLEVEL: MODERATE PAIN (4)

## 2021-06-29 NOTE — LETTER
6/29/2021       RE: Mahesh Porter  [address]  [facility] [address]     Dear Colleague,    Thank you for referring your patient, Mahesh Porter, to the [facility] UROLOG[facility]IN[facility] [place] at [facility]. Please see a copy of my visit note below.            Chief Complaint:   BPH with LUTS         History of Present Illness:    Mahesh Porter is a 61 year old male with a history of testicular cancer, on testosterone therapy chronically due to resultant hypogonadism, as well as BPH with weak stream, who presents for follow up regarding LUTS. I last saw him virtually on 3/22/21. In the context of his health history and testosterone therapy, his PSA was checked by his PCP, and was normal at 1.95. Testosterone levels also checked and within normal range. At our visit, he reported obstructive-type symptoms, including hesitancy and slow stream. He was started on Flomax 0.4 mg daily. Daily tadalafil 5 mg also refilled.     He has not yet trialed the Flomax consistently. He notes that he is on several other medications and struggles with polypharmacy. Would like to reduce the number of medications he is taking.          Past Medical History:     Past Medical History:   Diagnosis Date     Cancer (H) 2006    Testicular cancer     Hyperlipidemia      Hypertension      Rosacea 4/30/2014            Past Surgical History:     Past Surgical History:   Procedure Laterality Date     COLONOSCOPY N/A 11/30/2020    Procedure: COLONOSCOPY;  Surgeon: Glen Vela MD;  Location:  GI     GENITOURINARY SURGERY Right 2006    Rt testicle removed for testicular cancer            Medications     Current Outpatient Medications   Medication     buPROPion (WELLBUTRIN XL) 300 MG 24 hr tablet     DULoxetine (CYMBALTA) 30 MG capsule     DULoxetine (CYMBALTA) 60 MG capsule     eszopiclone (LUNESTA) 3 MG tablet     fenofibrate (TRICOR) 145 MG tablet     lisinopril (ZESTRIL) 10 MG  "tablet     needle, disp, 18G X 1\" MISC     Needle, Disp, 25G X 1-1/2\" MISC     Probiotic Product (PROBIOTIC & ACIDOPHILUS EX ST) CAPS     propranolol (INDERAL) 20 MG tablet     rosuvastatin (CRESTOR) 20 MG tablet     syringe, disposable, 1 ML MISC     tadalafil (CIALIS) 5 MG tablet     tamsulosin (FLOMAX) 0.4 MG capsule     testosterone (ANDROGEL 1.62 % PUMP) 20.25 MG/ACT gel     Vitamin D3 (CHOLECALCIFEROL) 25 mcg (1000 units) tablet     No current facility-administered medications for this visit.             Family History:     Family History   Problem Relation Age of Onset     Lupus Mother      Childhood Heart Disease Father      Parkinsonism Father      Skin Cancer No family hx of      Melanoma No family hx of             Social History:     Social History     Socioeconomic History     Marital status:      Spouse name: Not on file     Number of children: Not on file     Years of education: Not on file     Highest education level: Not on file   Occupational History     Not on file   Social Needs     Financial resource strain: Not on file     Food insecurity     Worry: Not on file     Inability: Not on file     Transportation needs     Medical: Not on file     Non-medical: Not on file   Tobacco Use     Smoking status: Never Smoker     Smokeless tobacco: Never Used   Substance and Sexual Activity     Alcohol use: Yes     Comment: a drink a day     Drug use: No     Sexual activity: Yes     Partners: Female   Lifestyle     Physical activity     Days per week: Not on file     Minutes per session: Not on file     Stress: Not on file   Relationships     Social connections     Talks on phone: Not on file     Gets together: Not on file     Attends Latter day service: Not on file     Active member of club or organization: Not on file     Attends meetings of clubs or organizations: Not on file     Relationship status: Not on file     Intimate partner violence     Fear of current or ex partner: Not on file     " Emotionally abused: Not on file     Physically abused: Not on file     Forced sexual activity: Not on file   Other Topics Concern     Parent/sibling w/ CABG, MI or angioplasty before 65F 55M? Not Asked   Social History Narrative     Not on file            Allergies:   Paroxetine and Penicillins         Review of Systems:  From intake questionnaire   Negative 14 system review except as noted on HPI, nurse's note.         Physical Exam:   Patient is a 61 year old  male   Vitals: Blood pressure 132/87, pulse 85.  General Appearance Adult: Alert, no acute distress, oriented  Lungs: no respiratory distress, or pursed lip breathing  Heart: No obvious jugular venous distension present  Abdomen: soft, nontender, no organomegaly or masses, There is no height or weight on file to calculate BMI.  : TAYLOR anodular, symmetric; moderate enlargement noted.      Uroflow and Post-Void Residual by Bladder Scan     PVR: 30 mL      Labs and Pathology:    I personally reviewed all applicable laboratory data and went over findings with patient  Significant for:    CBC RESULTS:  Recent Labs   Lab Test 01/12/21  0807 07/06/18  0747   WBC 4.2 8.9   HGB 16.1 14.1    274        BMP RESULTS:  Recent Labs   Lab Test 08/03/20  0746 01/13/20  0745 04/15/19  1120 07/06/18  0747    138 138 143   POTASSIUM 4.1 4.1 4.5 4.6   CHLORIDE 105 105 104 108   CO2 26 29 28 26   ANIONGAP 7 4 6 9   * 103* 111* 117*   BUN 22 21 24 16   CR 1.49* 1.46* 1.28* 1.32*   GFRESTIMATED 51* 52* 61 56*   GFRESTBLACK 59* 60* 71 68   ADAIR 9.0 9.0 9.2 8.2*       PSA RESULTS  PSA   Date Value Ref Range Status   01/12/2021 1.95 0 - 4 ug/L Final     Comment:     Assay Method:  Chemiluminescence using Siemens Vista analyzer   07/06/2018 2.81 0 - 4 ug/L Final     Comment:     Assay Method:  Chemiluminescence using Siemens Vista analyzer            Assessment and Plan:     Assessment: 61 year old male with a history of testicular cancer, on testosterone therapy  chronically due to resultant hypogonadism, as well as BPH with weak stream, who has continued to experience LUTS. Moderate prostatomegaly on TAYLOR. PVR low today at 30 mL. Has not yet trialed Flomax consistently, partly out of concern for polypharmacy. However, he will plan to trial this now.     Plan:  -Trial the Flomax.  -Follow up with me in 3 months via virtual visit  -Will plan to recheck PSA in 6 months (December-January)  -Pharmacy consult placed to review his medications with him.       Gabby Sow, CNP  Department of Urology

## 2021-06-29 NOTE — PATIENT INSTRUCTIONS
UROLOGY CLINIC VISIT PATIENT INSTRUCTIONS    -Trial the Flomax.  -Follow up with me in 3 months via virtual visit  -Will plan to check your PSA in 6 months (December-January)  -I have also placed a referral to the pharmacy team to review your medications with you     If you have any issues, questions or concerns in the meantime, do not hesitate to contact us at [phone] or via [employer].     It was a pleasure meeting with you today.  Thank you for allowing me and my team the privilege of caring for you today.  YOU are the reason we are here, and I truly hope we provided you with the excellent service you deserve.  Please let us know if there is anything else we can do for you so that we can be sure you are leaving completely satisfied with your care experience.    Gabby Sow, CNP       no

## 2021-07-02 ENCOUNTER — TELEPHONE (OUTPATIENT)
Dept: UROLOGY | Facility: CLINIC | Age: 61
End: 2021-07-02

## 2021-07-02 NOTE — TELEPHONE ENCOUNTER
MTM referral from: [facility] visit (referral by provider)    MTM referral outreach attempt #2 on July 2, 2021 at 3:02 PM      Outcome: Patient not reachable after several attempts, will route to MTM Pharmacist/Provider as an FYI. Thank you for the referral.    Steve Umana, MTM coordinator

## 2021-07-24 DIAGNOSIS — E78.5 HYPERLIPIDEMIA LDL GOAL <100: Primary | ICD-10-CM

## 2021-07-26 RX ORDER — ROSUVASTATIN CALCIUM 20 MG/1
10 TABLET, COATED ORAL DAILY
Qty: 45 TABLET | Refills: 1 | Status: SHIPPED | OUTPATIENT
Start: 2021-07-26

## 2021-07-26 NOTE — TELEPHONE ENCOUNTER
Routing refill request to provider for review/approval because:  Medication is reported/historical    Jonathan Blevins RN  [facility] Triage Nurse

## 2021-08-07 ENCOUNTER — TELEPHONE (OUTPATIENT)
Dept: ENDOCRINOLOGY | Facility: CLINIC | Age: 61
End: 2021-08-07

## 2021-09-24 ENCOUNTER — PRE VISIT (OUTPATIENT)
Dept: UROLOGY | Facility: CLINIC | Age: 61
End: 2021-09-24

## 2021-09-24 NOTE — TELEPHONE ENCOUNTER
Reason for visit: 3 month symptom check      Dx/Hx/Sx: BPH w/weak stream, testicular cancer, hypogonadism,     Records/imaging/labs/orders: in epic     At Rooming: video visit

## 2021-09-28 ENCOUNTER — MEDICAL CORRESPONDENCE (OUTPATIENT)
Dept: HEALTH INFORMATION MANAGEMENT | Facility: CLINIC | Age: 61
End: 2021-09-28

## 2021-10-02 ENCOUNTER — HEALTH MAINTENANCE LETTER (OUTPATIENT)
Age: 61
End: 2021-10-02

## 2021-10-15 ENCOUNTER — VIRTUAL VISIT (OUTPATIENT)
Dept: UROLOGY | Facility: CLINIC | Age: 61
End: 2021-10-15
Payer: COMMERCIAL

## 2021-10-15 DIAGNOSIS — R39.198 SLOWING OF URINARY STREAM: Primary | ICD-10-CM

## 2021-10-15 DIAGNOSIS — R39.11 URINARY HESITANCY: ICD-10-CM

## 2021-10-15 PROCEDURE — 99213 OFFICE O/P EST LOW 20 MIN: CPT | Mod: GT | Performed by: NURSE PRACTITIONER

## 2021-10-15 RX ORDER — BUPROPION HYDROCHLORIDE 150 MG/1
TABLET ORAL
COMMUNITY
Start: 2021-06-14 | End: 2024-01-17

## 2021-10-15 RX ORDER — SAW/PYGEUM/BETA/HERB/D3/B6/ZN 30 MG-25MG
1 CAPSULE ORAL AT BEDTIME
COMMUNITY
Start: 2021-04-27

## 2021-10-15 RX ORDER — FINASTERIDE 1 MG/1
TABLET, FILM COATED ORAL
COMMUNITY
Start: 2021-03-14 | End: 2021-10-15

## 2021-10-15 RX ORDER — GABAPENTIN 600 MG/1
TABLET ORAL
COMMUNITY
Start: 2021-08-30 | End: 2024-01-17

## 2021-10-15 ASSESSMENT — PATIENT HEALTH QUESTIONNAIRE - PHQ9: SUM OF ALL RESPONSES TO PHQ QUESTIONS 1-9: 5

## 2021-10-15 NOTE — PATIENT INSTRUCTIONS
UROLOGY CLINIC VISIT PATIENT INSTRUCTIONS    -I will in touch with you via [employer] with results of your PSA, once complete.  -Will otherwise plan on a yearly visit next fall to check in on symptoms.     If you have any issues, questions or concerns in the meantime, do not hesitate to contact us at [phone] or via [employer].     It was a pleasure meeting with you today.  Thank you for allowing me and my team the privilege of caring for you today.  YOU are the reason we are here, and I truly hope we provided you with the excellent service you deserve.  Please let us know if there is anything else we can do for you so that we can be sure you are leaving completely satisfied with your care experience.    Gabby Sow, CNP

## 2021-10-15 NOTE — PROGRESS NOTES
Chung is a 61 year old who is being evaluated via a billable video visit.      How would you like to obtain your AVS? [email or web address]  If the video visit is dropped, the invitation should be resent by: Send to e-mail at: [email or web address]  Will anyone else be joining your video visit? No    Video Start Time: 9:02 AM  Video-Visit Details    Type of service:  Video Visit    Video End Time: 9:13 AM    Originating Location (pt. Location): Home    Distant Location (provider location):  [facility] UROLOGY CLIN[facility] [place]     Platform used for Video Visit: Torin Porter complains of   Chief Complaint   Patient presents with     Follow Up     symptom check        Assessment/Plan:  61 year old male with a history of testicular cancer, now on testosterone replacement therapy, who has experienced some obstructing LUTS (hesitancy, slow stream), which has remained stable on no medication. Does not wish to start anything now. Is due for a PSA in the coming months, so will have this checked with his other standing lab work.  -I will be in touch with him regarding his PSA result via [email or web address].   -Will otherwise plan for a yearly visit next fall.     Gabby Sow, CNP  Department of Urology      Subjective:     61 year old male with a history of testicular cancer, on testosterone therapy chronically due to resultant hypogonadism, as well as BPH with weak stream, who presents for virtual follow up regarding obstructive LUTS (hesitancy, slow stream). He was started on Flomax in March. At our last follow up visit in June, he had not been taking the Flomax consistently. Struggled with polypharmacy and wanting to reduce the number of medications he was taking. After we discussed this further, he did agree to try this more consistently, and a pharmacy referral was place to discuss his medications. PVR low at that visit (30 mL).     Today, he states that he decided not to take the Flomax. He did take finasteride  "previously, as well, it looks like a low dose for hair loss, but he has also stopped this. Symptoms vary, but \"80% of the time\" his urination seems adequate. Initiation of his stream remains slow, but he feels that he continues to empty well.       Objective:     Exam:   Patient is a 61 year old male   General Appearance: Well groomed, hygenic  Respiratory: No cough, no respiratory distress or labored breathing  Musculoskeletal: Grossly normal with no gross deficits  Skin: No discoloration or apparent rashes  Neurologic: No tremors  Psychiatric: Alert and oriented  Further examination is deferred due to the nature of our visit.             "

## 2021-10-15 NOTE — LETTER
10/15/2021       RE: Mahesh Porter  [address]  [facility] [address]     Dear Colleague,    Thank you for referring your patient, Mahesh Porter, to the [facility] UROLOGY CLIN[facility] [place] at [facility]. Please see a copy of my visit note below.    Chung is a 61 year old who is being evaluated via a billable video visit.      How would you like to obtain your AVS? [employer]  If the video visit is dropped, the invitation should be resent by: Send to e-mail at: [email or web address]  Will anyone else be joining your video visit? No    Video Start Time: 9:02 AM  Video-Visit Details    Type of service:  Video Visit    Video End Time: 9:13 AM    Originating Location (pt. Location): Home    Distant Location (provider location):  [facility] UROLOGY CL[facility] [place]     Platform used for Video Visit: Anais       Mahesh Porter complains of   Chief Complaint   Patient presents with     Follow Up     symptom check        Assessment/Plan:  61 year old male with a history of testicular cancer, now on testosterone replacement therapy, who has experienced some obstructing LUTS (hesitancy, slow stream), which has remained stable on no medication. Does not wish to start anything now. Is due for a PSA in the coming months, so will have this checked with his other standing lab work.  -I will be in touch with him regarding his PSA result via [employer].   -Will otherwise plan for a yearly visit next fall.     Gabby Sow, CNP  Department of Urology      Subjective:     61 year old male with a history of testicular cancer, on testosterone therapy chronically due to resultant hypogonadism, as well as BPH with weak stream, who presents for virtual follow up regarding obstructive LUTS (hesitancy, slow stream). He was started on Flomax in March. At our last follow up visit in June, he had not been taking the Flomax consistently. Struggled with polypharmacy and wanting to  "reduce the number of medications he was taking. After we discussed this further, he did agree to try this more consistently, and a pharmacy referral was place to discuss his medications. PVR low at that visit (30 mL).     Today, he states that he decided not to take the Flomax. He did take finasteride previously, as well, it looks like a low dose for hair loss, but he has also stopped this. Symptoms vary, but \"80% of the time\" his urination seems adequate. Initiation of his stream remains slow, but he feels that he continues to empty well.       Objective:     Exam:   Patient is a 61 year old male   General Appearance: Well groomed, hygenic  Respiratory: No cough, no respiratory distress or labored breathing  Musculoskeletal: Grossly normal with no gross deficits  Skin: No discoloration or apparent rashes  Neurologic: No tremors  Psychiatric: Alert and oriented  Further examination is deferred due to the nature of our visit.       "

## 2021-12-06 ENCOUNTER — LAB (OUTPATIENT)
Dept: LAB | Facility: CLINIC | Age: 61
End: 2021-12-06
Payer: COMMERCIAL

## 2021-12-06 DIAGNOSIS — E29.1 MALE HYPOGONADISM: ICD-10-CM

## 2021-12-06 DIAGNOSIS — R39.11 BENIGN PROSTATIC HYPERPLASIA WITH URINARY HESITANCY: ICD-10-CM

## 2021-12-06 DIAGNOSIS — N40.1 BENIGN PROSTATIC HYPERPLASIA WITH URINARY HESITANCY: ICD-10-CM

## 2021-12-06 DIAGNOSIS — N18.31 STAGE 3A CHRONIC KIDNEY DISEASE (H): ICD-10-CM

## 2021-12-06 LAB
ERYTHROCYTE [DISTWIDTH] IN BLOOD BY AUTOMATED COUNT: 13.6 % (ref 10–15)
HBA1C MFR BLD: 6 % (ref 0–5.6)
HCT VFR BLD AUTO: 48.1 % (ref 40–53)
HGB BLD-MCNC: 15.6 G/DL (ref 13.3–17.7)
MCH RBC QN AUTO: 31 PG (ref 26.5–33)
MCHC RBC AUTO-ENTMCNC: 32.4 G/DL (ref 31.5–36.5)
MCV RBC AUTO: 96 FL (ref 78–100)
PLATELET # BLD AUTO: 251 10E3/UL (ref 150–450)
RBC # BLD AUTO: 5.03 10E6/UL (ref 4.4–5.9)
WBC # BLD AUTO: 5.3 10E3/UL (ref 4–11)

## 2021-12-06 PROCEDURE — 84403 ASSAY OF TOTAL TESTOSTERONE: CPT

## 2021-12-06 PROCEDURE — 83036 HEMOGLOBIN GLYCOSYLATED A1C: CPT

## 2021-12-06 PROCEDURE — 84153 ASSAY OF PSA TOTAL: CPT

## 2021-12-06 PROCEDURE — 85027 COMPLETE CBC AUTOMATED: CPT

## 2021-12-06 PROCEDURE — 36415 COLL VENOUS BLD VENIPUNCTURE: CPT

## 2021-12-06 PROCEDURE — 80053 COMPREHEN METABOLIC PANEL: CPT

## 2021-12-08 LAB
ALBUMIN SERPL-MCNC: 3.9 G/DL (ref 3.4–5)
ALP SERPL-CCNC: 62 U/L (ref 40–150)
ALT SERPL W P-5'-P-CCNC: 40 U/L (ref 0–70)
ANION GAP SERPL CALCULATED.3IONS-SCNC: 4 MMOL/L (ref 3–14)
AST SERPL W P-5'-P-CCNC: 21 U/L (ref 0–45)
BILIRUB SERPL-MCNC: 0.7 MG/DL (ref 0.2–1.3)
BUN SERPL-MCNC: 21 MG/DL (ref 7–30)
CALCIUM SERPL-MCNC: 8.6 MG/DL (ref 8.5–10.1)
CHLORIDE BLD-SCNC: 103 MMOL/L (ref 94–109)
CO2 SERPL-SCNC: 29 MMOL/L (ref 20–32)
CREAT SERPL-MCNC: 1.16 MG/DL (ref 0.66–1.25)
GFR SERPL CREATININE-BSD FRML MDRD: 68 ML/MIN/1.73M2
GLUCOSE BLD-MCNC: 177 MG/DL (ref 70–99)
POTASSIUM BLD-SCNC: 4.2 MMOL/L (ref 3.4–5.3)
PROT SERPL-MCNC: 7.5 G/DL (ref 6.8–8.8)
PSA SERPL-MCNC: 2.2 UG/L (ref 0–4)
SODIUM SERPL-SCNC: 136 MMOL/L (ref 133–144)
TESTOST SERPL-MCNC: 128 NG/DL (ref 240–950)

## 2021-12-08 NOTE — RESULT ENCOUNTER NOTE
Link Wilkes,    #1 the testosterone level is less than the desired range.  Have you been using the testosterone gel on regular basis?  The testosterone level on the same dose was 502 previously.  Please clarify your current dose and also if you have missed any dosages.2.  The complete metabolic panel that includes kidney function, liver function test results and electrolytes are overall stable compared to the previous results.  The blood glucose level which was high at 177.  Your hemoglobin A1c was 6 which is in the prediabetes range.3.  Your complete blood count that includes hemoglobin and hematocrit levels were within the normal range.    Please schedule a follow-up visit to further discuss next steps regarding the aforementioned results.      Please let me know if any questions the meantime.  Jett Tirado MD

## 2021-12-13 NOTE — RESULT ENCOUNTER NOTE
"Pt didn't see the following Sandag message. Please call and advise.     \"Link Wilkes,     #1 the testosterone level is less than the desired range.  Have you been using the testosterone gel on regular basis?  The testosterone level on the same dose was 502 previously.  Please clarify your current dose and also if you have missed any dosages.  2.  The complete metabolic panel that includes kidney function, liver function test results and electrolytes are overall stable compared to the previous results.  The blood glucose level which was high at 177.  Your hemoglobin A1c was 6 which is in the prediabetes range.  3.  Your complete blood count that includes hemoglobin and hematocrit levels were within the normal range.     Please schedule a follow-up visit to further discuss next steps regarding the aforementioned results.       Please let me know if any questions the meantime.  Jett Tirado MD\""

## 2021-12-21 ENCOUNTER — E-VISIT (OUTPATIENT)
Dept: URGENT CARE | Facility: URGENT CARE | Age: 61
End: 2021-12-21
Payer: COMMERCIAL

## 2021-12-21 DIAGNOSIS — Z20.822 CLOSE EXPOSURE TO 2019 NOVEL CORONAVIRUS: Primary | ICD-10-CM

## 2021-12-21 PROCEDURE — 99421 OL DIG E/M SVC 5-10 MIN: CPT | Performed by: PHYSICIAN ASSISTANT

## 2021-12-21 NOTE — PATIENT INSTRUCTIONS
"  Dear Mahesh Porter,    Based on your exposure to COVID-19 (coronavirus), we would like to test you for this virus. I have placed an order for this test.The best time for testing is 5-7 days after the exposure.    How to schedule:  Go to your [employer] home page and scroll down to the section that says  You have an appointment that needs to be scheduled  and click the large green button that says  Schedule Now  and follow the steps to find the next available opening.     If you are unable to complete these [employer] scheduling steps, please call [phone] to schedule your testing.     Return to work/school/ guidance:   For people with high risk exposures outside the home    Please let your workplace manager and staffing office know when your quarantine ends.     We can not give you an exact date as it depends on the information below. You can calculate this on your own or work with your manager/staffing office to calculate this. (For example if you were exposed on 10/4, you would have to quarantine for 14 full days. That would be through 10/18. You could return on 10/19.)    Quarantine Guidelines:  Patients (\"contacts\") who have been in close prolonged contact of an infected person(s) (within six feet for at least 15 minutes within a 24 hour period), and remain asymptomatic should enter quarantine based on the following options:    14-day quarantine period (this remains the CDC recommendation for the greatest protection against spread of COVID-19) OR    Minimum 7-day quarantine with negative RT-PCR test collected on day 5 or later OR    10-day quarantine with no test  Quarantine Guideline exceptions are as follows:    People who have been fully vaccinated do not need to quarantine if the exposure was at least 2 weeks after the last vaccination. This includes vaccinated health care workers.    Not fully vaccinated and unvaccinated Individuals who work in health care, congregate care, or congregate living " should be off work for 14 days from their last date of exposure. Community activities for this group can be resumed based on options above. Fully vaccinated individuals in this group do not need to quarantine from work after exposure.    Not fully vaccinated and unvaccinated people whose high-risk exposure was a household member should always quarantine for 14 days from their last date of exposure. Fully vaccinated people in this category do not need to quarantine.    Not fully vaccinated or unvaccinated residents of congregate care and congregate living settings should always quarantine for 14 days from their last date of exposure. Fully vaccinated residents do not need to quarantine.  Note: If you have ongoing exposure to the covid positive person, this quarantine period may be more than 14 days. (For example, if you are continued to be exposed to your child who tested positive and cannot isolate from them, then the quarantine of 7-14 days can't start until your child is no longer contagious. This is typically 10 days from onset of the child's symptoms. So the total duration may be 17-24 days in this case.)    You should continue symptom monitoring until day 14 post-exposure. If you develop signs or symptoms of COVID-19, isolate and get tested (even if you have been tested already).    How to quarantine:   Stay home and away from others. Don't go to school or anywhere else. Generally quarantine means staying home from work but there are some exceptions to this. Please contact your workplace.  No hugging, kissing or shaking hands.  Don't let anyone visit.  Cover your mouth and nose with a mask, tissue or washcloth to avoid spreading germs.  Wash your hands and face often. Use soap and water.    What are the symptoms of COVID-19?  The most common symptoms are cough, fever and trouble breathing. Less common symptoms include headache, body aches, fatigue (feeling very tired), chills, sore throat, stuffy or runny nose,  diarrhea (loose poop), loss of taste or smell, belly pain, and nausea or vomiting (feeling sick to your stomach or throwing up).  After 14 days, if you have still don't have symptoms, you likely don't have this virus.  If you develop symptoms, follow these guidelines.  If you're normally healthy: Please start another eVisit.  If you have a serious health problem (like cancer, heart failure, an organ transplant or kidney disease): Call your specialty clinic. Let them know that you might have COVID-19.    Where can I get more information?  [facility] - About COVID-19: [email or web address]  CDC - What to Do If You're Sick: www.cdc.gov/coronavirus/2019-ncov/about/steps-when-sick.html  CDC - Ending Home Isolation: www.cdc.gov/coronavirus/2019-ncov/hcp/disposition-in-home-patients.html  CDC - Caring for Someone: www.cdc.gov/coronavirus/2019-ncov/if-you-are-sick/care-for-someone.html  [facility] clinical trials (COVID-19 research studies): [email or web address]-clinical-trials  Below are the COVID-19 hotlines at the [place] Department of Health ([facility]). Interpreters are available.  For health questions: Call 505-411-1610 or 1-322.204.4513 (7 a.m. to 7 p.m.)  For questions about schools and childcare: Call 650-363-9122 or 1-293.258.6669 (7 a.m. to 7 p.m.)

## 2021-12-23 ENCOUNTER — LAB (OUTPATIENT)
Dept: LAB | Facility: CLINIC | Age: 61
End: 2021-12-23
Attending: PHYSICIAN ASSISTANT
Payer: COMMERCIAL

## 2021-12-23 DIAGNOSIS — Z20.822 CLOSE EXPOSURE TO 2019 NOVEL CORONAVIRUS: ICD-10-CM

## 2021-12-23 PROCEDURE — U0003 INFECTIOUS AGENT DETECTION BY NUCLEIC ACID (DNA OR RNA); SEVERE ACUTE RESPIRATORY SYNDROME CORONAVIRUS 2 (SARS-COV-2) (CORONAVIRUS DISEASE [COVID-19]), AMPLIFIED PROBE TECHNIQUE, MAKING USE OF HIGH THROUGHPUT TECHNOLOGIES AS DESCRIBED BY CMS-2020-01-R: HCPCS

## 2021-12-23 PROCEDURE — U0005 INFEC AGEN DETEC AMPLI PROBE: HCPCS

## 2021-12-24 LAB — SARS-COV-2 RNA RESP QL NAA+PROBE: NEGATIVE

## 2022-01-21 ENCOUNTER — LAB (OUTPATIENT)
Dept: LAB | Facility: CLINIC | Age: 62
End: 2022-01-21
Payer: COMMERCIAL

## 2022-01-21 DIAGNOSIS — E29.1 HYPOGONADISM MALE: ICD-10-CM

## 2022-01-21 PROCEDURE — 36415 COLL VENOUS BLD VENIPUNCTURE: CPT

## 2022-01-21 PROCEDURE — 84403 ASSAY OF TOTAL TESTOSTERONE: CPT

## 2022-01-26 ENCOUNTER — VIRTUAL VISIT (OUTPATIENT)
Dept: ENDOCRINOLOGY | Facility: CLINIC | Age: 62
End: 2022-01-26
Payer: COMMERCIAL

## 2022-01-26 DIAGNOSIS — E29.1 HYPOGONADISM MALE: Primary | ICD-10-CM

## 2022-01-26 DIAGNOSIS — R73.03 PREDIABETES: ICD-10-CM

## 2022-01-26 PROCEDURE — 99213 OFFICE O/P EST LOW 20 MIN: CPT | Mod: 95 | Performed by: INTERNAL MEDICINE

## 2022-01-26 RX ORDER — TESTOSTERONE 1.62 MG/G
3 GEL TRANSDERMAL DAILY
Qty: 150 G | Refills: 5 | Status: SHIPPED | OUTPATIENT
Start: 2022-01-26 | End: 2022-02-16

## 2022-01-26 RX ORDER — TESTOSTERONE 1.62 MG/G
3 GEL TRANSDERMAL DAILY
Qty: 75 G | Refills: 5 | Status: CANCELLED | OUTPATIENT
Start: 2022-01-26

## 2022-01-26 NOTE — LETTER
Date:January 27, 2022      Patient was self referred, no letter generated. Do not send.        [facility] Health Information

## 2022-01-26 NOTE — PROGRESS NOTES
Mahesh Porter  is being evaluated via a billable video visit.      How would you like to obtain your AVS? MyChar  For the video visit, send the invitation by: Send to e-mail at: [email or web address]  Will anyone else be joining your video visit? No

## 2022-01-26 NOTE — LETTER
1/26/2022       RE: Mahesh Porter  [address]  [place] [facility] [address]     Dear Colleague,    Thank you for referring your patient, Mahesh Porter, to the [facility] [place] [facility]. Please see a copy of my visit note below.    Mahesh Porter  is being evaluated via a billable video visit.      How would you like to obtain your AVS? MyChart  For the video visit, send the invitation by: Send to e-mail at: [email or web address]  Will anyone else be joining your video visit? No          Endocrinology virtual Visit    Chief Complaint: Video Visit     Information obtained from:Patient      Assessment/Treatment Plan:      Primary hypogonadism secondary to history of orchiectomy -currently on testosterone gel.  Has been using AndroGel 1.62% pump 2 pumps onto the skin daily.  Target testosterone 300-400's.  Recently done testosterone levels were low. He has been having fatigue and reduced libido on the current dose. Testosterone level pending. Increase testosterone gel to 3 pumps daily.  Follow up testosterone levels in 6 months.    Complications monitoring from testosterone replacement standpoint  History of mild obstructive sleep apnea-clinically stable  Blood pressure controlled historically.   Currently on a statin treatment  Liver function test results stable  PSA stable  Hematocrit stable.    Prediabetes: life style changes- please review written material provided for additional information.  Follow up A1c in six months.     I will contact the patient with the test results.  Return to clinic in 12 months.    Test and/or medications prescribed today:  Orders Placed This Encounter   Procedures     Testosterone total     Hemoglobin A1c         Jett Tirado MD  Staff Endocrinologist    Division of Endocrinology and Diabetes      Subjective:         HPI: Mahesh Porter is a 61 year old male with history of hypogonadism who is seen  in consultation at Gabby Sow's request for the same.    History of testicular cancer status post unilateral orchiectomy in 2006.  Developed hypogonadism following orchiectomy and has been on replacement therapy with testosterone over the last 10 years.  Currently using testosterone gel-AndroGel 1.62% 2 pumps per day.  Last testosterone level was low 200's on this dose.   Hematocrit was 50.  PSA within normal limits.  Blood pressure well controlled.  Has history of mild obstructive sleep apnea.   He is currently following with neurologist for a sleep disorder.      History of hyperlipidemia currently on statin.  Last LDL was 135.  Has been having neck pain for which he is scheduled for a steroid shot.  Currently on gabapentin for pain control which caused erectile dysfunction.  Answers for HPI/ROS submitted by the patient on 1/25/2022  General Symptoms: No  Skin Symptoms: No  HENT Symptoms: No  EYE SYMPTOMS: No  HEART SYMPTOMS: No  LUNG SYMPTOMS: No  INTESTINAL SYMPTOMS: No  URINARY SYMPTOMS: No  REPRODUCTIVE SYMPTOMS: Yes  SKELETAL SYMPTOMS: No  BLOOD SYMPTOMS: No  NERVOUS SYSTEM SYMPTOMS: No  MENTAL HEALTH SYMPTOMS: No  Scrotal pain or swelling: No  Erectile dysfunction: Yes  Penile discharge: No  Genital ulcers: No  Reduced libido: Yes      Allergies   Allergen Reactions     Paroxetine Other (See Comments)     And ED     Penicillins Rash       Current Outpatient Medications   Medication Sig Dispense Refill     buPROPion (WELLBUTRIN XL) 150 MG 24 hr tablet TAKE 1 TABLET BY MOUTH EVERY MORNING AS DIRECTED WITH 300MG FOR TOTAL OF 450MG DAILY       buPROPion (WELLBUTRIN XL) 300 MG 24 hr tablet Take 1 tablet (300 mg) by mouth every morning TAKE 1 TABLET(150 MG) BY MOUTH EVERY MORNING 90 tablet 1     DULoxetine (CYMBALTA) 30 MG capsule Take 90 mg by mouth daily       DULoxetine (CYMBALTA) 60 MG capsule TK 1 C PO QD       eszopiclone (LUNESTA) 3 MG tablet Take 3 mg by mouth At Bedtime       fenofibrate (TRICOR)  145 MG tablet TAKE 1 TABLET(145 MG) BY MOUTH DAILY 90 tablet 1     gabapentin (NEURONTIN) 600 MG tablet TAKE 1 TABLET BY MOUTH TWICE DAILY FOR NERVE PAIN       Melatonin 10 MG TBCR Take 1 tablet by mouth At Bedtime       Probiotic Product (PROBIOTIC & ACIDOPHILUS EX ST) CAPS Take 1 capsule by mouth       propranolol (INDERAL) 20 MG tablet Take 40 mg by mouth 2 times daily as needed        rosuvastatin (CRESTOR) 20 MG tablet Take 0.5 tablets (10 mg) by mouth daily 45 tablet 1     tadalafil (CIALIS) 5 MG tablet TAKE 1 TABLET (5MG) BY MOUTH EVERY 24 HOURS 90 tablet 3     testosterone (ANDROGEL 1.62 % PUMP) 20.25 MG/ACT gel Place 2 Pump onto the skin daily Apply from dispenser to clean, dry, intact skin of the upper arms and shoulders. 75 g 5     Vitamin D3 (CHOLECALCIFEROL) 25 mcg (1000 units) tablet Take by mouth daily         Review of Systems     as per HPI above    Objective:     GENERAL: Healthy, alert and no distress  EYES: Eyes grossly normal to inspection.   RESP: No audible wheeze, cough, or visible cyanosis.   SKIN: Visible skin clear.   NEURO: Alert and oriented.    PSYCH: Mentation appears normal, affect normal/bright, judgement and insight intact, normal speech.    In House Labs:       TSH   Date Value Ref Range Status   08/03/2020 1.68 0.40 - 4.00 mU/L Final   07/06/2018 0.93 0.40 - 4.00 mU/L Final       Creatinine   Date Value Ref Range Status   12/06/2021 1.16 0.66 - 1.25 mg/dL Final   08/03/2020 1.49 (H) 0.66 - 1.25 mg/dL Final     LDL Cholesterol Calculated   Date Value Ref Range Status   01/12/2021 135 (H) <100 mg/dL Final     Comment:     Above desirable:  100-129 mg/dl  Borderline High:  130-159 mg/dL  High:             160-189 mg/dL  Very high:       >189 mg/dl       Hematocrit   Date Value Ref Range Status   12/06/2021 48.1 40.0 - 53.0 % Final   01/12/2021 50.0 40.0 - 53.0 % Final     END[facility] Latest Ref Rng & Units 12/6/2021 1/12/2021   TESTOSTERONE TOTAL 240 - 950 ng/dL 128 (L)  502       PSA   Date Value Ref Range Status   01/12/2021 1.95 0 - 4 ug/L Final     Comment:     Assay Method:  Chemiluminescence using Siemens Vista analyzer     PSA Tumor Marker   Date Value Ref Range Status   12/06/2021 2.20 0.00 - 4.00 ug/L Final     Video-Visit Details    Type of service:  Video Visit  1st VideoStart: 01/26/2022 10:33 am  Stop: 01/26/2022 10:40 am  Originating Location (pt. Location): Home  Platform used for Video Visit: [employer]ote  40 minutes spent on the date of the encounter doing chart review, history and  documentation and further activities per the note.         Again, thank you for allowing me to participate in the care of your patient.      Sincerely,    Jett Tirado MD

## 2022-01-26 NOTE — PROGRESS NOTES
Endocrinology virtual Visit    Chief Complaint: Video Visit     Information obtained from:Patient      Assessment/Treatment Plan:      Primary hypogonadism secondary to history of orchiectomy -currently on testosterone gel.  Has been using AndroGel 1.62% pump 2 pumps onto the skin daily. He has using gel daily - he is compliant with medication administration.  Target testosterone 300-400's.  Recently done testosterone levels were low. He has been having fatigue and reduced libido on the current dose. Testosterone level pending. Increase testosterone gel to 3 pumps daily.  Follow up testosterone levels in 6 months.    Complications monitoring from testosterone replacement standpoint  History of mild obstructive sleep apnea-clinically stable  Blood pressure controlled historically.   Currently on a statin treatment  Liver function test results stable  PSA stable  Hematocrit stable.    Prediabetes: life style changes- please review written material provided for additional information.  Follow up A1c in six months.     I will contact the patient with the test results.  Return to clinic in 12 months.    Test and/or medications prescribed today:  Orders Placed This Encounter   Procedures     Testosterone total     Hemoglobin A1c         Jett Tirado MD  Staff Endocrinologist    Division of Endocrinology and Diabetes      Subjective:         HPI: Mahesh Porter is a 61 year old male with history of hypogonadism who is seen in consultation at [facility]'s request for the same.    History of testicular cancer status post unilateral orchiectomy in 2006.  Developed hypogonadism following orchiectomy and has been on replacement therapy with testosterone over the last 10 years.  Currently using testosterone gel-AndroGel 1.62% 2 pumps per day.  Last testosterone level was low 200's on this dose.   Hematocrit was 50.  PSA within normal limits.  Blood pressure well controlled.  Has history of mild obstructive sleep  apnea.   He is currently following with neurologist for a sleep disorder.      History of hyperlipidemia currently on statin.  Last LDL was 135.  Has been having neck pain for which he is scheduled for a steroid shot.  Currently on gabapentin for pain control which caused erectile dysfunction.  Answers for HPI/ROS submitted by the patient on 1/25/2022  General Symptoms: No  Skin Symptoms: No  HENT Symptoms: No  EYE SYMPTOMS: No  HEART SYMPTOMS: No  LUNG SYMPTOMS: No  INTESTINAL SYMPTOMS: No  URINARY SYMPTOMS: No  REPRODUCTIVE SYMPTOMS: Yes  SKELETAL SYMPTOMS: No  BLOOD SYMPTOMS: No  NERVOUS SYSTEM SYMPTOMS: No  MENTAL HEALTH SYMPTOMS: No  Scrotal pain or swelling: No  Erectile dysfunction: Yes  Penile discharge: No  Genital ulcers: No  Reduced libido: Yes      Allergies   Allergen Reactions     Paroxetine Other (See Comments)     And ED     Penicillins Rash       Current Outpatient Medications   Medication Sig Dispense Refill     buPROPion (WELLBUTRIN XL) 150 MG 24 hr tablet TAKE 1 TABLET BY MOUTH EVERY MORNING AS DIRECTED WITH 300MG FOR TOTAL OF 450MG DAILY       buPROPion (WELLBUTRIN XL) 300 MG 24 hr tablet Take 1 tablet (300 mg) by mouth every morning TAKE 1 TABLET(150 MG) BY MOUTH EVERY MORNING 90 tablet 1     DULoxetine (CYMBALTA) 30 MG capsule Take 90 mg by mouth daily       DULoxetine (CYMBALTA) 60 MG capsule TK 1 C PO QD       eszopiclone (LUNESTA) 3 MG tablet Take 3 mg by mouth At Bedtime       fenofibrate (TRICOR) 145 MG tablet TAKE 1 TABLET(145 MG) BY MOUTH DAILY 90 tablet 1     gabapentin (NEURONTIN) 600 MG tablet TAKE 1 TABLET BY MOUTH TWICE DAILY FOR NERVE PAIN       Melatonin 10 MG TBCR Take 1 tablet by mouth At Bedtime       Probiotic Product (PROBIOTIC & ACIDOPHILUS EX ST) CAPS Take 1 capsule by mouth       propranolol (INDERAL) 20 MG tablet Take 40 mg by mouth 2 times daily as needed        rosuvastatin (CRESTOR) 20 MG tablet Take 0.5 tablets (10 mg) by mouth daily 45 tablet 1     tadalafil  (CIALIS) 5 MG tablet TAKE 1 TABLET (5MG) BY MOUTH EVERY 24 HOURS 90 tablet 3     testosterone (ANDROGEL 1.62 % PUMP) 20.25 MG/ACT gel Place 2 Pump onto the skin daily Apply from dispenser to clean, dry, intact skin of the upper arms and shoulders. 75 g 5     Vitamin D3 (CHOLECALCIFEROL) 25 mcg (1000 units) tablet Take by mouth daily         Review of Systems     as per HPI above    Objective:     GENERAL: Healthy, alert and no distress  EYES: Eyes grossly normal to inspection.   RESP: No audible wheeze, cough, or visible cyanosis.   SKIN: Visible skin clear.   NEURO: Alert and oriented.    PSYCH: Mentation appears normal, affect normal/bright, judgement and insight intact, normal speech.    In House Labs:       TSH   Date Value Ref Range Status   08/03/2020 1.68 0.40 - 4.00 mU/L Final   07/06/2018 0.93 0.40 - 4.00 mU/L Final       Creatinine   Date Value Ref Range Status   12/06/2021 1.16 0.66 - 1.25 mg/dL Final   08/03/2020 1.49 (H) 0.66 - 1.25 mg/dL Final     LDL Cholesterol Calculated   Date Value Ref Range Status   01/12/2021 135 (H) <100 mg/dL Final     Comment:     Above desirable:  100-129 mg/dl  Borderline High:  130-159 mg/dL  High:             160-189 mg/dL  Very high:       >189 mg/dl       Hematocrit   Date Value Ref Range Status   12/06/2021 48.1 40.0 - 53.0 % Final   01/12/2021 50.0 40.0 - 53.0 % Final     [facility] Latest Ref Rng & Units 12/6/2021 1/12/2021   TESTOSTERONE TOTAL 240 - 950 ng/dL 128 (L) 502       PSA   Date Value Ref Range Status   01/12/2021 1.95 0 - 4 ug/L Final     Comment:     Assay Method:  Chemiluminescence using Siemens Vista analyzer     PSA Tumor Marker   Date Value Ref Range Status   12/06/2021 2.20 0.00 - 4.00 ug/L Final     Video-Visit Details    Type of service:  Video Visit  1st VideoStart: 01/26/2022 10:33 am  Stop: 01/26/2022 10:40 am  Originating Location (pt. Location): Home  Platform used for Video Visit: MeMed  40 minutes spent on the date of the  encounter doing chart review, history and  documentation and further activities per the note.

## 2022-01-26 NOTE — PATIENT INSTRUCTIONS
Chung,     We will contact you after the testosterone results.   Here is information on prediabetes.       Patient Education     Prediabetes  You have been diagnosed with prediabetes. This means that the level of sugar (glucose) in your blood is too high. If you have prediabetes, you are at risk for developing type 2 diabetes. Type 2 diabetes is diagnosed when the level of glucose in the blood reaches a certain high level. With prediabetes, it hasn t reached this point yet. But it's higher than normal. It is vital to make lifestyle changes to lower your blood sugar, improve your health, and prevent diabetes.       Why worry about prediabetes?  Prediabetes is a condition where the body s cells have trouble using glucose in the blood for energy. As a result, too much glucose stays in the blood. This can affect how your heart and blood vessels work. Without changes in diet and lifestyle, the problem can get worse. Once you have type 2 diabetes, it's ongoing (chronic). It needs to be managed for the rest of your life. Diabetes can harm the body and your health by damaging organs, such as your eyes and kidneys. It makes you more likely to have heart disease. And it can damage nerves and blood vessels.   Who is a risk for prediabetes?  The exact cause of prediabetes is not clear. But certain risk factors make a person more likely to have it. These include:     A family history of type 2 diabetes    Being overweight    Being older than age 45    Have high blood pressure or high cholesterol     Having had gestational diabetes    Not being physically active    Being ,  American, , , , or   Diagnosing prediabetes  Prediabetes may have no symptoms. Or you may have some of the symptoms of diabetes (see below). The diagnosis is made with a blood test. You may have 1 or more of these blood tests:      Fasting glucose test. Blood is taken and tested after you  have fasted (not eaten) for at least 8 hours. A normal test result is 99 milligrams per deciliter (mg/dL) or lower. Prediabetes is 100 mg/dL to 125 mg/dL. Diabetes is 126 mg/dL or higher.    Glucose tolerance test. Your blood sugar is measured before and after you drink a very sugary liquid. A normal test result is 139 milligrams per deciliter (mg/dL) or lower. Prediabetes is 140 mg/dL to 199 mg/dL. Diabetes is 200 mg/dL or higher.    Hemoglobin A1c (HbA1c). Your HbA1c is normal if it is below 5.7%. Prediabetes is 5.7% to 6.4%. Diabetes is 6.5% or higher.   Treating prediabetes  The best way to treat prediabetes is to lose at least 5% to 7% of your current weight and be more physically active by getting at least 150 minutes a week of physical activity (at least 30 minutes daily.) When sitting for long periods of time, get up for short sessions of light activity every 30 minutes. These changes help the body s cells use blood sugar better. Even a small amount of weight loss can help. Work with your healthcare provider to make a plan to eat well and be more active. Keep in mind that small changes can add up. Other changes in your lifestyle (or even taking certain medicines, such as metformin) may make you less likely to develop diabetes. Your provider can talk with you about these. Stopping smoking will decrease your risk of developing diabetes. Don't use e-cigarettes or vaping products. But you may gain some weight if you are not careful.   Ask your healthcare provider for a referral to a lifestyle intervention program. This program will help you get to and stay at a 7% weight loss and increase physical activity.   Follow-up  If not treated, prediabetes can turn into diabetes. This is a serious health condition. Take steps to stop this from happening. Follow the treatment plan you have been given. You may have your blood glucose tested again in about 12 to 18 months.   Diabetes symptoms   Let your healthcare provider  know if you have any of the following:    Always feel very tired    Feel very thirsty or hungry much of the time    Have to urinate often    Lose weight for no reason    Feel numbness or tingling in your fingers or toes    Have cuts or bruises that don t seem to heal    Have blurry vision  [employer] last reviewed this educational content on 6/1/2019 2000-2021 [employer]. All rights reserved. This information is not intended as a substitute for professional medical care. Always follow your healthcare professional's instructions.

## 2022-01-27 LAB — TESTOST SERPL-MCNC: 260 NG/DL (ref 240–950)

## 2022-01-27 NOTE — RESULT ENCOUNTER NOTE
Chung,     The testosterone level was low normal. As we have discussed, please increase the dose of Testosterone 20.25 MG/ACT (1.62%) to 3 Pump onto the skin daily Apply from dispenser to clean, dry, intact skin of the upper arms and shoulders. Prescription sent to pharmacy.   Thank you,     Jett Tirado MD

## 2022-01-28 ENCOUNTER — TELEPHONE (OUTPATIENT)
Dept: ENDOCRINOLOGY | Facility: CLINIC | Age: 62
End: 2022-01-28
Payer: COMMERCIAL

## 2022-01-28 NOTE — TELEPHONE ENCOUNTER
Prior Authorization Retail Medication Request    Medication/Dose: testosterone (ANDROGEL 1.62 % PUMP) 20.25 MG/ACT gel  ICD code (if different than what is on RX): Hypogonadism male [E29.1]  Previously Tried and Failed:    Rationale:     Insurance Name:    Insurance ID: [id number]    Pharmacy Information (if different than what is on RX)  Name:  [id number] [place] [id number] AT [facility] [address]  Phone: [phone]

## 2022-01-31 ENCOUNTER — MYC MEDICAL ADVICE (OUTPATIENT)
Dept: ENDOCRINOLOGY | Facility: CLINIC | Age: 62
End: 2022-01-31
Payer: COMMERCIAL

## 2022-01-31 NOTE — TELEPHONE ENCOUNTER
Central Prior Authorization Team   Phone: [phone]      PA Initiation    Medication: testosterone (ANDROGEL 1.62 % PUMP) 20.25 MG/ACT gel-PA initiated  Insurance Company: [employer] - Phone [phone] Fax [phone]  Pharmacy Filling the Rx: [facility] #[id number] [place] [id number] AT [facility] [place] [address]  Filling Pharmacy Phone: [phone]  Filling Pharmacy Fax:    Start Date: 1/31/2022

## 2022-02-03 NOTE — TELEPHONE ENCOUNTER
I got a voice mail from Bart at [facility] say something was wrong with the submission, so I resent this at his request. New key [id number].

## 2022-02-03 NOTE — TELEPHONE ENCOUNTER
Bart and Holly from [facility] called for more information. We verified the testosterone levels and question arose why testesterone went from 502 to 128 in 12 months. We tried to trace therapy, and question arose why he changed from injection to gel, but there are no chart notes why was done. Chart notes/med changes are very confusing. Bart will take the information I gave with therapy dates and testosterone levels and will get back to me if he needs further information.

## 2022-02-03 NOTE — TELEPHONE ENCOUNTER
PRIOR AUTHORIZATION DENIED    Medication: testosterone (ANDROGEL 1.62 % PUMP) 20.25 MG/ACT gel-PA denied    Denial Date:  2/3/22    Denial Rational: Per voicemail from Zoraida at [facility], this was denied. Patient has been non-compliant still in filling this. He has filled this on 10/2 and 12/13.

## 2022-02-03 NOTE — TELEPHONE ENCOUNTER
Spoke w/ Pt: States Has been applying testosterone as directed with no missed doses. He has variability during day. Typically gets labs done in the AM shortly after Rx application. However, Lab draw on 12/06/2021 was drawn at 3:58pm on 1.5 pumps.  Increased to 2 pumps daily shortly after, and is lidya on 3 pumps daily.  Last year PT Switched from injection to gel. Used injections when he had no insurance and that was all he could afford. Injecting Q2 weeks and had wide fluctuation in testosterone and symptoms. Feels daily application of gel is more stable.   Symptoms are: decreased libido; erectile dysfunction occasionally, overall fatigue.  Provider notified.   Marzena Alexander RN on 2/3/2022 at 12:22 PM

## 2022-02-04 NOTE — TELEPHONE ENCOUNTER
Obtained updated information, included in new prior auth submitted    PA Initiation    Medication: testosterone (ANDROGEL 1.62 % PUMP) 20.25 MG/ACT gel-PA pending  Insurance Company: [employer] - Phone [phone] Fax [phone]  Pharmacy Filling the Rx: [facility] #[id number] [facility] [address] AT [facility] [address]  Filling Pharmacy Phone: [phone]  Filling Pharmacy Fax:    Start Date: 2/4/2022

## 2022-02-07 ENCOUNTER — MYC MEDICAL ADVICE (OUTPATIENT)
Dept: ENDOCRINOLOGY | Facility: CLINIC | Age: 62
End: 2022-02-07
Payer: COMMERCIAL

## 2022-02-16 ENCOUNTER — MYC MEDICAL ADVICE (OUTPATIENT)
Dept: ENDOCRINOLOGY | Facility: CLINIC | Age: 62
End: 2022-02-16
Payer: COMMERCIAL

## 2022-02-16 DIAGNOSIS — E29.1 HYPOGONADISM MALE: ICD-10-CM

## 2022-02-16 RX ORDER — TESTOSTERONE 1.62 MG/G
3 GEL TRANSDERMAL DAILY
Qty: 75 G | Refills: 5 | Status: SHIPPED | OUTPATIENT
Start: 2022-02-16 | End: 2022-08-04

## 2022-02-16 NOTE — TELEPHONE ENCOUNTER
Called [employer] (pbm is [employer])  to check on status of prior auth, spoke to Sandra(?) representative. Medication has been authorized pa expires 05/03/2022 approved 2.5 per day max is 75 per month. Called at 1121am 02/16/22. Liaison did not receive paperwork stating approval.     Call plan to do pa for qty limits override [phone]. Pt needs 150gm per 30 day. Called 1135am 02/16/22. Spoke to Ferdinand suárez. Need to fill out prescription drug form to update qty on auth.  Auth:# for current pa: [id number].    Filled out prescription drug reconsideration form, and emailed to Dr. Tirado for signature, and review.   Once returned liaison will fax to [phone].

## 2022-02-16 NOTE — TELEPHONE ENCOUNTER
Medication sent to pharmacy for 75 gm with 5 refills per information below. Please let us know if more updates from insurance. Please inform Mahesh Porter about the update.

## 2022-02-17 NOTE — TELEPHONE ENCOUNTER
Obtained signed documentation from MD, faxed 02/17/22 1pm to [facility], [phone] cover plus 1 page.   Called as well per request on form. Left message on secure voicemail line for appeals.

## 2022-02-17 NOTE — TELEPHONE ENCOUNTER
Aiyana from [facility] was overturned. And approved. [phone].   Once liaison gets approval letter, will update encounter with dates on record.

## 2022-02-17 NOTE — TELEPHONE ENCOUNTER
[facility] called to verify pa form. Reviewer will expedite request per basis pt was only able to get partial supply (75gm per 30d).

## 2022-02-21 NOTE — TELEPHONE ENCOUNTER
Prior Authorization Approval    Authorization Effective Date: 2/17/2022  Authorization Expiration Date: 2/16/2023  Medication: testosterone (ANDROGEL 1.62 % PUMP) 20.25 MG/ACT gel-PA approved  Approved Dose/Quantity: 150gm per 30d  Reference #: [id number]   Insurance Company: [employer] - Phone [phone] Fax [phone]  Expected CoPay:       CoPay Card Available:      Foundation Assistance Needed:    Which Pharmacy is filling the prescription (Not needed for infusion/clinic administered): [facility] #[id number] [address] AT [facility] [place] OF [address] [facility]  Pharmacy Notified:   Patient Notified: yes          Called jhoan Nowak message on secure voicemail. When running test claim to get est copay for above documentation, still states PA is needed. Called for help to make sure claim is processing correctly. Or if claim is bumping into qty pt got last week in the interm.

## 2022-02-24 NOTE — TELEPHONE ENCOUNTER
Spoke to Vicky at [facility], ran another test claim today, came up refill to soon, can be filled 03/10/22.   PA should be good to go for 150gm per 30d

## 2022-03-03 DIAGNOSIS — N52.9 ERECTILE DYSFUNCTION, UNSPECIFIED ERECTILE DYSFUNCTION TYPE: Primary | ICD-10-CM

## 2022-03-03 RX ORDER — SILDENAFIL 100 MG/1
100 TABLET, FILM COATED ORAL DAILY PRN
Qty: 30 TABLET | Refills: 4 | Status: SHIPPED | OUTPATIENT
Start: 2022-03-03 | End: 2023-04-14

## 2022-05-06 ENCOUNTER — TELEPHONE (OUTPATIENT)
Dept: INTERNAL MEDICINE | Facility: CLINIC | Age: 62
End: 2022-05-06

## 2022-05-06 NOTE — TELEPHONE ENCOUNTER
Pt calling because he was at the dentist yesterday (5/6) and his blood pressure was 160/110 (took BP 6 times, this is the average). Dentist would like pt to follow up with PCP for BP concern.    Please advise on whether using Same-Day or Virtual Slot for 5/10 - 5/15 is possible.    Team to call pt back to schedule appt once Dr. Capellan advises.    Anisa Syed,  [facility]

## 2022-05-08 ENCOUNTER — HEALTH MAINTENANCE LETTER (OUTPATIENT)
Age: 62
End: 2022-05-08

## 2022-06-06 ENCOUNTER — MYC MEDICAL ADVICE (OUTPATIENT)
Dept: UROLOGY | Facility: CLINIC | Age: 62
End: 2022-06-06
Payer: COMMERCIAL

## 2022-06-13 DIAGNOSIS — N52.9 ERECTILE DYSFUNCTION, UNSPECIFIED ERECTILE DYSFUNCTION TYPE: Primary | ICD-10-CM

## 2022-06-13 RX ORDER — TADALAFIL 5 MG/1
5 TABLET ORAL EVERY 24 HOURS
Qty: 30 TABLET | Refills: 5 | Status: SHIPPED | OUTPATIENT
Start: 2022-06-13 | End: 2022-08-16

## 2022-08-04 ENCOUNTER — MYC MEDICAL ADVICE (OUTPATIENT)
Dept: ENDOCRINOLOGY | Facility: CLINIC | Age: 62
End: 2022-08-04

## 2022-08-04 DIAGNOSIS — E29.1 HYPOGONADISM MALE: ICD-10-CM

## 2022-08-04 DIAGNOSIS — E29.1 HYPOGONADISM MALE: Primary | ICD-10-CM

## 2022-08-08 RX ORDER — TESTOSTERONE 1.62 MG/G
GEL TRANSDERMAL
Qty: 150 G | Refills: 5 | Status: SHIPPED | OUTPATIENT
Start: 2022-08-08 | End: 2022-08-29

## 2022-08-08 NOTE — TELEPHONE ENCOUNTER
Orders Placed This Encounter   Procedures     Lipid panel reflex to direct LDL Fasting     Hemoglobin A1c     CBC with platelets     Testosterone total   the following labs ordered. Informed via elaina Tirado MD

## 2022-08-15 ENCOUNTER — MYC MEDICAL ADVICE (OUTPATIENT)
Dept: UROLOGY | Facility: CLINIC | Age: 62
End: 2022-08-15

## 2022-08-15 DIAGNOSIS — N52.9 ERECTILE DYSFUNCTION, UNSPECIFIED ERECTILE DYSFUNCTION TYPE: ICD-10-CM

## 2022-08-16 RX ORDER — TADALAFIL 5 MG/1
5 TABLET ORAL EVERY 24 HOURS
Qty: 30 TABLET | Refills: 4 | Status: SHIPPED | OUTPATIENT
Start: 2022-08-16 | End: 2023-02-03

## 2022-08-16 NOTE — TELEPHONE ENCOUNTER
tadalafil (CIALIS) 5 MG tablet  Last Written Prescription Date:   6/13/2022  Last Fill Quantity: 30,   # refills: 5  Last Office Visit : 10/15/2021  Future Office visit:  None    Routing refill request to provider for review/approval because:  I called the Pharmacy on 8/16/2022 at 11:18 AM.   Sent a new order over due to the one in the pharmacy computer does not have the refills for Pt care.   New order sent to pharm for Pt care on 8/16/2022.  30 Tabs 4 Refills sent to pharm 8/16/2022      Kelley Kendall RN  Central Triage Red Flags/Med Refills

## 2022-08-26 ENCOUNTER — LAB (OUTPATIENT)
Dept: LAB | Facility: CLINIC | Age: 62
End: 2022-08-26
Payer: COMMERCIAL

## 2022-08-26 DIAGNOSIS — E29.1 HYPOGONADISM MALE: ICD-10-CM

## 2022-08-26 DIAGNOSIS — R73.03 PREDIABETES: ICD-10-CM

## 2022-08-26 LAB
CHOLEST SERPL-MCNC: 190 MG/DL
ERYTHROCYTE [DISTWIDTH] IN BLOOD BY AUTOMATED COUNT: 13.4 % (ref 10–15)
FASTING STATUS PATIENT QL REPORTED: YES
HBA1C MFR BLD: 6.2 % (ref 0–5.6)
HCT VFR BLD AUTO: 49.1 % (ref 40–53)
HDLC SERPL-MCNC: 50 MG/DL
HGB BLD-MCNC: 15.8 G/DL (ref 13.3–17.7)
LDLC SERPL CALC-MCNC: 99 MG/DL
MCH RBC QN AUTO: 30.8 PG (ref 26.5–33)
MCHC RBC AUTO-ENTMCNC: 32.2 G/DL (ref 31.5–36.5)
MCV RBC AUTO: 96 FL (ref 78–100)
NONHDLC SERPL-MCNC: 140 MG/DL
PLATELET # BLD AUTO: 273 10E3/UL (ref 150–450)
RBC # BLD AUTO: 5.13 10E6/UL (ref 4.4–5.9)
TRIGL SERPL-MCNC: 205 MG/DL
WBC # BLD AUTO: 5.3 10E3/UL (ref 4–11)

## 2022-08-26 PROCEDURE — 85027 COMPLETE CBC AUTOMATED: CPT

## 2022-08-26 PROCEDURE — 83036 HEMOGLOBIN GLYCOSYLATED A1C: CPT

## 2022-08-26 PROCEDURE — 80061 LIPID PANEL: CPT

## 2022-08-26 PROCEDURE — 84403 ASSAY OF TOTAL TESTOSTERONE: CPT

## 2022-08-26 PROCEDURE — 36415 COLL VENOUS BLD VENIPUNCTURE: CPT

## 2022-08-29 DIAGNOSIS — E29.1 HYPOGONADISM MALE: ICD-10-CM

## 2022-08-29 LAB — TESTOST SERPL-MCNC: 1162 NG/DL (ref 240–950)

## 2022-08-29 RX ORDER — TESTOSTERONE 1.62 MG/G
GEL TRANSDERMAL
Qty: 150 G | Refills: 5
Start: 2022-08-29 | End: 2024-01-17

## 2022-08-29 NOTE — RESULT ENCOUNTER NOTE
Dear Chung,     Here are your recent results.  #1 the testosterone level is significantly higher than desired.  I recommend to reduce the testosterone treatment to 2 pumps per day from the current dose of 3 pumps per day.  Please recheck testosterone level in 1 month to make sure that it is within appropriate range.  Testosterone level higher than the target range in the 300s or 400s has undesired consequences therefore correcting this right away is recommended.  Cholesterol level indicated slightly increased triglyceride levels.  This can be addressed by adjusting your dietary intake of simple sugars and limiting fats in addition to the statin cholesterol medication you are currently taking.  The complete blood count results are within the normal limits.  Hemoglobin A1c continues indicates prediabetes.  Continue lifestyle changes including weight loss and dietary modifications to address this issue.  Other options of treatment including medications will be discussed at the time of your follow-up appointment.  Please let us know if you have any questions or concerns.     Regards,  eJtt Tirado MD

## 2022-08-29 NOTE — PROGRESS NOTES
Dear Chung,      Here are your recent results.  #1 the testosterone level is significantly higher than desired.  I recommend to reduce the testosterone treatment to 2 pumps per day from the current dose of 3 pumps per day.  Please recheck testosterone level in 1 month to make sure that it is within appropriate range.  Testosterone level higher than the target range in the 300s or 400s has undesired consequences therefore correcting this right away is recommended.  Cholesterol level indicated slightly increased triglyceride levels.  This can be addressed by adjusting your dietary intake of simple sugars and limiting fats in addition to the statin cholesterol medication you are currently taking.  The complete blood count results are within the normal limits.  Hemoglobin A1c continues indicates prediabetes.  Continue lifestyle changes including weight loss and dietary modifications to address this issue.  Other options of treatment including medications will be discussed at the time of your follow-up appointment.  Please let us know if you have any questions or concerns.      Regards,  Jett Tirado MD

## 2022-09-15 DIAGNOSIS — E29.1 HYPOGONADISM MALE: ICD-10-CM

## 2022-09-15 RX ORDER — TESTOSTERONE 1.62 MG/G
2 GEL TRANSDERMAL DAILY
Qty: 75 G | Refills: 5 | Status: SHIPPED | OUTPATIENT
Start: 2022-09-15 | End: 2023-03-24

## 2022-09-15 RX ORDER — TESTOSTERONE 1.62 MG/G
GEL TRANSDERMAL
Qty: 150 G | Refills: 5 | Status: CANCELLED | OUTPATIENT
Start: 2022-09-15

## 2022-09-15 NOTE — TELEPHONE ENCOUNTER
Latest Reference Range & Units 08/26/22 11:04   Hematocrit 40.0 - 53.0 % 49.1      Latest Reference Range & Units 08/26/22 11:04   LDL Cholesterol Calculated <=100 mg/dL 99     PSA   Date Value Ref Range Status   01/12/2021 1.95 0 - 4 ug/L Final     Comment:     Assay Method:  Chemiluminescence using Siemens Vista analyzer     PSA Tumor Marker   Date Value Ref Range Status   12/06/2021 2.20 0.00 - 4.00 ug/L Final

## 2023-01-14 ENCOUNTER — HEALTH MAINTENANCE LETTER (OUTPATIENT)
Age: 63
End: 2023-01-14

## 2023-01-24 ENCOUNTER — LAB (OUTPATIENT)
Dept: LAB | Facility: CLINIC | Age: 63
End: 2023-01-24
Payer: COMMERCIAL

## 2023-01-24 DIAGNOSIS — E29.1 HYPOGONADISM MALE: ICD-10-CM

## 2023-01-24 PROCEDURE — 84403 ASSAY OF TOTAL TESTOSTERONE: CPT

## 2023-01-24 PROCEDURE — 36415 COLL VENOUS BLD VENIPUNCTURE: CPT

## 2023-01-25 ENCOUNTER — VIRTUAL VISIT (OUTPATIENT)
Dept: ENDOCRINOLOGY | Facility: CLINIC | Age: 63
End: 2023-01-25
Payer: COMMERCIAL

## 2023-01-25 DIAGNOSIS — R73.03 PREDIABETES: ICD-10-CM

## 2023-01-25 DIAGNOSIS — E29.1 HYPOGONADISM MALE: Primary | ICD-10-CM

## 2023-01-25 PROCEDURE — 99214 OFFICE O/P EST MOD 30 MIN: CPT | Mod: 95 | Performed by: INTERNAL MEDICINE

## 2023-01-25 NOTE — PROGRESS NOTES
"Endocrinology virtual Visit    Chief Complaint: Video Visit (Hypogonadism)     Information obtained from:Patient      Assessment/Treatment Plan:      Primary hypogonadism secondary to history of orchiectomy -currently on testosterone gel.  Has been using AndroGel 1.62% pump 2 pumps onto the skin daily. He has using gel daily - he is compliant with medication administration.  Target testosterone 300-400's.  testosterone levels pending. Symptoms stable.   Complications monitoring from testosterone replacement standpoint  History of mild obstructive sleep apnea-clinically stable  Blood pressure controlled historically.   Currently on a statin treatment  Liver function test results stable  PSA stable  Hematocrit stable.    Prediabetes: life style changes- please review written material provided for additional information.  Follow up A1c now and will consider metformin if still high.   Patient Instructions     Weight loss and exercise are recommended in the management of prediabetes.  Caloric restriction, portion control and physical activity are evidence based strategies for life style changes. Here is a starting point in your case:       Reduced calorie meal plan; set a goal eg: \"Goal is to lose 5 % weight in the next six months\".      Please keep a food journal of what you eat, calories in what you eat,.    Consider using applications for smart phones such as [employer]chu, Metrasens, Metanautix, LoseIt, Tap&Track, and RelaxM. To keep food journal and track your exercise.     Focus on wet volumetrics, meaning, eat more foods that are high in water and fiber such as fruits and vegetables in order to get that full feeling. These are also good for your overall health as well.    Progressively increase physical activity to 45 minutes, including combination of cardio & resistance training x 5 days weekly. Start at 10-15 minutes per day and progressively work towards this goal.     We will check a follow up lab and if it " is high; then we will start you a medication known as metformin.   Please check the following labs,     Orders Placed This Encounter   Procedures     PSA tumor marker     CBC with platelets     Hemoglobin A1c                Test and/or medications prescribed today:  Orders Placed This Encounter   Procedures     PSA tumor marker     CBC with platelets     Hemoglobin A1c         Jett Tirado MD  Staff Endocrinologist    Division of Endocrinology and Diabetes      Subjective:         HPI: Mahesh Porter is a 62 year old male with history of hypogonadism who is here for a follow up.   History of testicular cancer status post unilateral orchiectomy in 2006.  Developed hypogonadism following orchiectomy and has been on replacement therapy with testosterone over the last 10 years.  Currently using testosterone gel-AndroGel 1.62% 2 pumps per day.  Last testosterone level [id number] on three pump per day.   Hematocrit was 49.  PSA within normal limits.  Blood pressure well controlled.  Has history of mild obstructive sleep apnea.   He is currently following with neurologist for a sleep disorder.    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 Follows with [facility] [place].   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       Allergies   Allergen Reactions     Paroxetine Other (See Comments)     And ED     Penicillins Rash       Current Outpatient Medications   Medication Sig Dispense Refill     buPROPion (WELLBUTRIN XL) 300 MG 24 hr tablet Take 1 tablet (300 mg) by mouth every morning TAKE 1 TABLET(150 MG) BY MOUTH EVERY MORNING 90 tablet 1     DULoxetine  (CYMBALTA) 60 MG capsule TK 1 C PO QD       fenofibrate (TRICOR) 145 MG tablet TAKE 1 TABLET(145 MG) BY MOUTH DAILY 90 tablet 1     gabapentin (NEURONTIN) 600 MG tablet TAKE 1 TABLET BY MOUTH TWICE DAILY FOR NERVE PAIN       Melatonin 10 MG TBCR Take 1 tablet by mouth At Bedtime       Probiotic Product (PROBIOTIC & ACIDOPHILUS EX ST) CAPS Take 1 capsule by mouth       propranolol (INDERAL) 20 MG tablet Take 40 mg by mouth 2 times daily as needed        rosuvastatin (CRESTOR) 20 MG tablet Take 0.5 tablets (10 mg) by mouth daily 45 tablet 1     tadalafil (CIALIS) 5 MG tablet Take 1 tablet (5 mg) by mouth every 24 hours 30 tablet 4     testosterone (ANDROGEL 1.62 % PUMP) 20.25 MG/ACT gel Place 2 Pump onto the skin daily Apply from dispenser to clean, dry, intact skin of the upper arms and shoulders. 75 g 5     Vitamin D3 (CHOLECALCIFEROL) 25 mcg (1000 units) tablet Take by mouth daily       buPROPion (WELLBUTRIN XL) 150 MG 24 hr tablet TAKE 1 TABLET BY MOUTH EVERY MORNING AS DIRECTED WITH 300MG FOR TOTAL OF 450MG DAILY       DULoxetine (CYMBALTA) 30 MG capsule Take 90 mg by mouth daily       eszopiclone (LUNESTA) 3 MG tablet Take 3 mg by mouth At Bedtime       sildenafil (VIAGRA) 100 MG tablet Take 1 tablet (100 mg) by mouth daily as needed (Erectile dysfunction) 30 tablet 4     testosterone (ANDROGEL 1.62 % PUMP) 20.25 MG/ACT gel Place 2 pumps onto skin upper arms and shoulders daily 150 g 5       Review of Systems     as per HPI above    Objective:     Wt Readings from Last 10 Encounters:   01/12/21 98.7 kg (217 lb 11.2 oz)   11/30/20 94.3 kg (208 lb)   08/03/20 94.3 kg (208 lb)   03/09/20 95.7 kg (211 lb)   01/13/20 98 kg (216 lb)   06/17/19 98 kg (216 lb)   06/12/19 97.5 kg (215 lb)   04/30/19 95.3 kg (210 lb)   04/15/19 94.8 kg (209 lb)   12/03/18 97.3 kg (214 lb 8 oz)     GENERAL: Healthy, alert and no distress  EYES: Eyes grossly normal to inspection.   RESP: No audible wheeze, cough, or visible cyanosis.    SKIN: Visible skin clear.   NEURO: Alert and oriented.    PSYCH: Mentation appears normal, affect normal/bright, judgement and insight intact, normal speech.    In House Labs:       TSH   Date Value Ref Range Status   08/03/2020 1.68 0.40 - 4.00 mU/L Final   07/06/2018 0.93 0.40 - 4.00 mU/L Final       Creatinine   Date Value Ref Range Status   12/06/2021 1.16 0.66 - 1.25 mg/dL Final   08/03/2020 1.49 (H) 0.66 - 1.25 mg/dL Final     LDL Cholesterol Calculated   Date Value Ref Range Status   08/26/2022 99 <=100 mg/dL Final   01/12/2021 135 (H) <100 mg/dL Final     Comment:     Above desirable:  100-129 mg/dl  Borderline High:  130-159 mg/dL  High:             160-189 mg/dL  Very high:       >189 mg/dl       Hematocrit   Date Value Ref Range Status   08/26/2022 49.1 40.0 - 53.0 % Final   01/12/2021 50.0 40.0 - 53.0 % Final       PSA   Date Value Ref Range Status   01/12/2021 1.95 0 - 4 ug/L Final     Comment:     Assay Method:  Chemiluminescence using Siemens Vista analyzer     PSA Tumor Marker   Date Value Ref Range Status   12/06/2021 2.20 0.00 - 4.00 ug/L Final     Video-Visit Details    Type of service:  Video Visit  Video Start Time: 12:07 PM  Video End Time:12:21 PM  Originating [place] (pt. Location): Home  Distant Location (provider location):  Off-site.   Platform used for Video Visit: Anais Porter  is being evaluated via a billable video visit.      How would you like to obtain your AVS? MyChart  For the video visit, send the invitation by: Send to e-mail at: [email or web address]  Will anyone else be joining your video visit? No

## 2023-01-25 NOTE — LETTER
Date:January 26, 2023      Provider requested that no letter be sent. Do not send.       [facility]

## 2023-01-25 NOTE — PATIENT INSTRUCTIONS
"Weight loss and exercise are recommended in the management of prediabetes.  Caloric restriction, portion control and physical activity are evidence based strategies for life style changes. Here is a starting point in your case:     Reduced calorie meal plan; set a goal eg: \"Goal is to lose 5 % weight in the next six months\".    Please keep a food journal of what you eat, calories in what you eat,.  Consider using applications for smart phones such as [facility], PetSitnStay, [employer], LoseIt, Tap&Track, and RelaxM. To keep food journal and track your exercise.   Focus on wet volumetrics, meaning, eat more foods that are high in water and fiber such as fruits and vegetables in order to get that full feeling. These are also good for your overall health as well.  Progressively increase physical activity to 45 minutes, including combination of cardio & resistance training x 5 days weekly. Start at 10-15 minutes per day and progressively work towards this goal.   We will check a follow up lab and if it is high; then we will start you a medication known as metformin.   Please check the following labs,     Orders Placed This Encounter   Procedures    PSA tumor marker    CBC with platelets    Hemoglobin A1c        "

## 2023-01-25 NOTE — NURSING NOTE
Patient denies any changes since echeck-in regarding medication and allergies and states all information entered during echeck-in remains accurate.    Mecca Ramirez MA

## 2023-01-25 NOTE — LETTER
"1/25/2023       RE: Mahesh Porter  [address]  [facility] [address]     Dear Colleague,    Thank you for referring your patient, Mahesh Porter, to the [facility] ENDOCRINOLOGY CLINIC [place] [facility]. Please see a copy of my visit note below.    Endocrinology virtual Visit    Chief Complaint: Video Visit (Hypogonadism)     Information obtained from:Patient      Assessment/Treatment Plan:      Primary hypogonadism secondary to history of orchiectomy -currently on testosterone gel.  Has been using AndroGel 1.62% pump 2 pumps onto the skin daily. He has using gel daily - he is compliant with medication administration.  Target testosterone 300-400's.  testosterone levels pending. Symptoms stable.   Complications monitoring from testosterone replacement standpoint  History of mild obstructive sleep apnea-clinically stable  Blood pressure controlled historically.   Currently on a statin treatment  Liver function test results stable  PSA stable  Hematocrit stable.    Prediabetes: life style changes- please review written material provided for additional information.  Follow up A1c now and will consider metformin if still high.   Patient Instructions     Weight loss and exercise are recommended in the management of prediabetes.  Caloric restriction, portion control and physical activity are evidence based strategies for life style changes. Here is a starting point in your case:       Reduced calorie meal plan; set a goal eg: \"Goal is to lose 5 % weight in the next six months\".      Please keep a food journal of what you eat, calories in what you eat,.    Consider using applications for smart phones such as [employer], [employer], GroupZoomReciKoko, LoseIt, Tap&Track, and RelaxM. To keep food journal and track your exercise.     Focus on wet volumetrics, meaning, eat more foods that are high in water and fiber such as fruits and vegetables in order to get " that full feeling. These are also good for your overall health as well.    Progressively increase physical activity to 45 minutes, including combination of cardio & resistance training x 5 days weekly. Start at 10-15 minutes per day and progressively work towards this goal.     We will check a follow up lab and if it is high; then we will start you a medication known as metformin.   Please check the following labs,     Orders Placed This Encounter   Procedures     PSA tumor marker     CBC with platelets     Hemoglobin A1c                Test and/or medications prescribed today:  Orders Placed This Encounter   Procedures     PSA tumor marker     CBC with platelets     Hemoglobin A1c         Jett Tirado MD  Staff Endocrinologist    Division of Endocrinology and Diabetes      Subjective:         HPI: Mahesh Porter is a 62 year old male with history of hypogonadism who is here for a follow up.   History of testicular cancer status post unilateral orchiectomy in 2006.  Developed hypogonadism following orchiectomy and has been on replacement therapy with testosterone over the last 10 years.  Currently using testosterone gel-AndroGel 1.62% 2 pumps per day.  Last testosterone level [id number] on three pump per day.   Hematocrit was 49.  PSA within normal limits.  Blood pressure well controlled.  Has history of mild obstructive sleep apnea.   He is currently following with neurologist for a sleep disorder.    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 Follows with [facility] [place] [place].   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       Allergies   Allergen  Reactions     Paroxetine Other (See Comments)     And ED     Penicillins Rash       Current Outpatient Medications   Medication Sig Dispense Refill     buPROPion (WELLBUTRIN XL) 300 MG 24 hr tablet Take 1 tablet (300 mg) by mouth every morning TAKE 1 TABLET(150 MG) BY MOUTH EVERY MORNING 90 tablet 1     DULoxetine (CYMBALTA) 60 MG capsule TK 1 C PO QD       fenofibrate (TRICOR) 145 MG tablet TAKE 1 TABLET(145 MG) BY MOUTH DAILY 90 tablet 1     gabapentin (NEURONTIN) 600 MG tablet TAKE 1 TABLET BY MOUTH TWICE DAILY FOR NERVE PAIN       Melatonin 10 MG TBCR Take 1 tablet by mouth At Bedtime       Probiotic Product (PROBIOTIC & ACIDOPHILUS EX ST) CAPS Take 1 capsule by mouth       propranolol (INDERAL) 20 MG tablet Take 40 mg by mouth 2 times daily as needed        rosuvastatin (CRESTOR) 20 MG tablet Take 0.5 tablets (10 mg) by mouth daily 45 tablet 1     tadalafil (CIALIS) 5 MG tablet Take 1 tablet (5 mg) by mouth every 24 hours 30 tablet 4     testosterone (ANDROGEL 1.62 % PUMP) 20.25 MG/ACT gel Place 2 Pump onto the skin daily Apply from dispenser to clean, dry, intact skin of the upper arms and shoulders. 75 g 5     Vitamin D3 (CHOLECALCIFEROL) 25 mcg (1000 units) tablet Take by mouth daily       buPROPion (WELLBUTRIN XL) 150 MG 24 hr tablet TAKE 1 TABLET BY MOUTH EVERY MORNING AS DIRECTED WITH 300MG FOR TOTAL OF 450MG DAILY       DULoxetine (CYMBALTA) 30 MG capsule Take 90 mg by mouth daily       eszopiclone (LUNESTA) 3 MG tablet Take 3 mg by mouth At Bedtime       sildenafil (VIAGRA) 100 MG tablet Take 1 tablet (100 mg) by mouth daily as needed (Erectile dysfunction) 30 tablet 4     testosterone (ANDROGEL 1.62 % PUMP) 20.25 MG/ACT gel Place 2 pumps onto skin upper arms and shoulders daily 150 g 5       Review of Systems     as per HPI above    Objective:     Wt Readings from Last 10 Encounters:   01/12/21 98.7 kg (217 lb 11.2 oz)   11/30/20 94.3 kg (208 lb)   08/03/20 94.3 kg (208 lb)   03/09/20 95.7 kg (211 lb)    01/13/20 98 kg (216 lb)   06/17/19 98 kg (216 lb)   06/12/19 97.5 kg (215 lb)   04/30/19 95.3 kg (210 lb)   04/15/19 94.8 kg (209 lb)   12/03/18 97.3 kg (214 lb 8 oz)     GENERAL: Healthy, alert and no distress  EYES: Eyes grossly normal to inspection.   RESP: No audible wheeze, cough, or visible cyanosis.   SKIN: Visible skin clear.   NEURO: Alert and oriented.    PSYCH: Mentation appears normal, affect normal/bright, judgement and insight intact, normal speech.    In House Labs:       TSH   Date Value Ref Range Status   08/03/2020 1.68 0.40 - 4.00 mU/L Final   07/06/2018 0.93 0.40 - 4.00 mU/L Final       Creatinine   Date Value Ref Range Status   12/06/2021 1.16 0.66 - 1.25 mg/dL Final   08/03/2020 1.49 (H) 0.66 - 1.25 mg/dL Final     LDL Cholesterol Calculated   Date Value Ref Range Status   08/26/2022 99 <=100 mg/dL Final   01/12/2021 135 (H) <100 mg/dL Final     Comment:     Above desirable:  100-129 mg/dl  Borderline High:  130-159 mg/dL  High:             160-189 mg/dL  Very high:       >189 mg/dl       Hematocrit   Date Value Ref Range Status   08/26/2022 49.1 40.0 - 53.0 % Final   01/12/2021 50.0 40.0 - 53.0 % Final       PSA   Date Value Ref Range Status   01/12/2021 1.95 0 - 4 ug/L Final     Comment:     Assay Method:  Chemiluminescence using Siemens Vista analyzer     PSA Tumor Marker   Date Value Ref Range Status   12/06/2021 2.20 0.00 - 4.00 ug/L Final     Video-Visit Details    Type of service:  Video Visit  Video Start Time: 12:07 PM  Video End Time:12:21 PM  Originating [place] (pt. Location): Home  Distant Location (provider location):  Off-site.   Platform used for Video Visit: Anais Porter  is being evaluated via a billable video visit.      How would you like to obtain your AVS? MyChart  For the video visit, send the invitation by: Send to e-mail at: [email or web address]  Will anyone else be joining your video visit? No            Again, thank you for allowing me to participate in the  care of your patient.      Sincerely,    Jett Tirado MD

## 2023-01-26 LAB — TESTOST SERPL-MCNC: 261 NG/DL (ref 240–950)

## 2023-01-27 ENCOUNTER — TELEPHONE (OUTPATIENT)
Dept: ENDOCRINOLOGY | Facility: CLINIC | Age: 63
End: 2023-01-27
Payer: COMMERCIAL

## 2023-01-30 ENCOUNTER — MYC MEDICAL ADVICE (OUTPATIENT)
Dept: ENDOCRINOLOGY | Facility: CLINIC | Age: 63
End: 2023-01-30
Payer: COMMERCIAL

## 2023-01-30 DIAGNOSIS — E29.1 HYPOGONADISM MALE: Primary | ICD-10-CM

## 2023-01-30 NOTE — RESULT ENCOUNTER NOTE
Dear Chung,     Here are your recent results.  The testosterone level was within the normal limits/slightly lower than desired.  Based on our recent discussion; I recommend to continue the same dose of testosterone replacement therapy.      Please let us know if you have any questions or concerns.    Regards,  Jett Tirado MD

## 2023-02-02 DIAGNOSIS — N52.9 ERECTILE DYSFUNCTION, UNSPECIFIED ERECTILE DYSFUNCTION TYPE: ICD-10-CM

## 2023-02-03 ENCOUNTER — TELEPHONE (OUTPATIENT)
Dept: UROLOGY | Facility: CLINIC | Age: 63
End: 2023-02-03
Payer: COMMERCIAL

## 2023-02-03 RX ORDER — TADALAFIL 5 MG/1
5 TABLET ORAL EVERY 24 HOURS
Qty: 30 TABLET | Refills: 0 | Status: SHIPPED | OUTPATIENT
Start: 2023-02-03 | End: 2023-03-08

## 2023-02-04 NOTE — TELEPHONE ENCOUNTER
MEGAN and sent MyChart 2/4/2023 for pt to c/back to schedule a 1 year f/up with  - IN PERSON. Dr. Tirado also has lab orders for pt to complete in March 2023 per the checkout orders from visit on 1/25/2023. MAX NUMBER OF ATTEMPTS REACHED.

## 2023-03-04 DIAGNOSIS — N52.9 ERECTILE DYSFUNCTION, UNSPECIFIED ERECTILE DYSFUNCTION TYPE: ICD-10-CM

## 2023-03-08 RX ORDER — TADALAFIL 5 MG/1
5 TABLET ORAL EVERY 24 HOURS
Qty: 30 TABLET | Refills: 1 | Status: SHIPPED | OUTPATIENT
Start: 2023-03-08 | End: 2023-04-14

## 2023-03-08 NOTE — TELEPHONE ENCOUNTER
10/15/2021  [facility]rology [facility] [place]   Gabby Sow, CNP     Upcoming appt 4/14/23. Britney refill sent.

## 2023-03-20 ENCOUNTER — PRE VISIT (OUTPATIENT)
Dept: UROLOGY | Facility: CLINIC | Age: 63
End: 2023-03-20
Payer: COMMERCIAL

## 2023-03-20 NOTE — TELEPHONE ENCOUNTER
Reason for visit: med refill     Dx/Hx/Sx: ED    Records/imaging/labs/orders: in epic     At Rooming: video visit

## 2023-03-24 DIAGNOSIS — E29.1 HYPOGONADISM MALE: ICD-10-CM

## 2023-03-24 RX ORDER — TESTOSTERONE 1.62 MG/G
2 GEL TRANSDERMAL DAILY
Qty: 75 G | Refills: 1 | Status: SHIPPED | OUTPATIENT
Start: 2023-03-24 | End: 2023-05-31

## 2023-03-24 NOTE — TELEPHONE ENCOUNTER
testosterone (ANDROGEL 1.62 % PUMP) 20.25 MG/ACT gel      Last Written Prescription Date:  9-15-22  Last Fill Quantity: 75 g,   # refills: 5  Last Office Visit : 1-25-23  Future Office visit:  1-17-24    Routing refill request to provider for review/approval because:  Drug not on the [facility] or [facility] refill protocol or controlled substance  Overdue BP and  labs: AST,ALT, PSA

## 2023-03-27 ENCOUNTER — TELEPHONE (OUTPATIENT)
Dept: ENDOCRINOLOGY | Facility: CLINIC | Age: 63
End: 2023-03-27
Payer: COMMERCIAL

## 2023-03-27 NOTE — TELEPHONE ENCOUNTER
Per patient, this needs to go through PA process.     Prior Authorization Retail Medication Request    Medication/Dose: testosterone (ANDROGEL 1.62 % PUMP) 20.25 MG/ACT gel  ICD code (if different than what is on RX):    Previously Tried and Failed:    Rationale:      Insurance Name:    Insurance ID:        Pharmacy Information (if different than what is on RX)  Name:    Phone:

## 2023-03-27 NOTE — TELEPHONE ENCOUNTER
Central Prior Authorization Team  Phone: [phone]    Prior Authorization Approval    Authorization Effective Date: 3/27/2023  Authorization Expiration Date: 3/26/2024  Medication: testosterone (ANDROGEL 1.62 % PUMP) 20.25 MG/ACT gel  Approved Dose/Quantity:   Reference #:     Insurance Company: [employer] - Phone [phone] Fax [phone]  Expected CoPay:       CoPay Card Available:      Foundation Assistance Needed:    Which Pharmacy is filling the prescription (Not needed for infusion/clinic administered):    Pharmacy Notified: Yes  Patient Notified:      RECEIVED VOICEMAIL FROM [employer] @[phone]  INFORMING OF APPROVAL.

## 2023-03-27 NOTE — TELEPHONE ENCOUNTER
Central Prior Authorization Team  Phone: [phone]    PA Initiation    Medication: testosterone (ANDROGEL 1.62 % PUMP) 20.25 MG/ACT gel  Insurance Company: [employer] - Phone [phone] Fax [phone]  Pharmacy Filling the Rx:    Filling Pharmacy Phone:    Filling Pharmacy Fax:    Start Date: 3/27/2023

## 2023-04-14 ENCOUNTER — VIRTUAL VISIT (OUTPATIENT)
Dept: UROLOGY | Facility: CLINIC | Age: 63
End: 2023-04-14
Payer: COMMERCIAL

## 2023-04-14 DIAGNOSIS — N52.9 ERECTILE DYSFUNCTION, UNSPECIFIED ERECTILE DYSFUNCTION TYPE: ICD-10-CM

## 2023-04-14 DIAGNOSIS — R39.198 SLOWING OF URINARY STREAM: Primary | ICD-10-CM

## 2023-04-14 PROCEDURE — 99213 OFFICE O/P EST LOW 20 MIN: CPT | Mod: VID | Performed by: NURSE PRACTITIONER

## 2023-04-14 RX ORDER — TADALAFIL 5 MG/1
5 TABLET ORAL EVERY 24 HOURS
Qty: 90 TABLET | Refills: 3 | Status: SHIPPED | OUTPATIENT
Start: 2023-04-14

## 2023-04-14 NOTE — NURSING NOTE
Is the patient currently in the state of [place]? YES    Visit mode:VIDEO    If the visit is dropped, the patient can be reconnected by: VIDEO VISIT: Text to cell phone: [phone]    Will anyone else be joining the visit? NO      How would you like to obtain your AVS? MyChart    Are changes needed to the allergy or medication list? NO    Reason for visit: Video Visit (Annual follow up.)

## 2023-04-14 NOTE — PROGRESS NOTES
Virtual Visit Details    Type of service:  Video Visit   Video Start Time: 10:02 AM  Video End Time: 10:20 AM    Originating Location (pt. Location): Home  Distant Location (provider location):  Off-site  Platform used for Video Visit: Anais Aragon S [place] complains of   Chief Complaint   Patient presents with     Video Visit     Annual follow up.       Assessment/Plan:  62 year old male with a history of testicular cancer, on testosterone therapy chronically due to resultant hypogonadism, as well as BPH with weak stream who has been experiencing improved symptoms with an OTC supplement. Also continues to take tadalafil 5 mg daily, which helps with his stream and ED. PSA has been stable.   -Continue tadalafil 5 mg daily. Refills sent to pharmacy.   -He will see me in one year for an in-person clinic visit to evaluate his stream and post-void residual. He tried to do this a few years ago but accidentally voided upon arriving to the building so will come with a comfortably-full bladder. Will also update TAYLOR at that time. He will follow up with me sooner in the meantime if any issues/concerns.     Gabby Sow, CNP  Department of Urology      Subjective:   62 year old male with a history of testicular cancer, on testosterone therapy chronically due to resultant hypogonadism, as well as BPH with weak stream, who presents for virtual follow up regarding obstructive LUTS (hesitancy, slow stream). Our last visit was in 10/2021, at which time he felt his symptoms were stable without medication. Has been prescribed Flomax and finasteride in the past. His last PSA was checked a little over a year ago and was 2.20. He states that he had one drawn through his primary care clinic, with records of this not available for personal review today. Updated order placed and pending per his endocrinologist, Dr. Tirado.     Today, he states that he has been taking an herbal supplement lately containing ryegrass and feels this  has been helping with his urine flow. There are still times when it is harder to start urinating and his stream is quite slow. Has been better with this supplement. Getting up once per night, which he has been doing for many years. He plans to complete the pending labs ordered by Dr. Tirado within the next few weeks.     Continues to take tadalafil 5 mg daily for ED. This also seems to help with his stream. In need of refills.       Objective:     Exam:   Patient is a 62 year old male   No vital signs obtained due to virtual visit.  General Appearance: Well groomed, hygenic  Respiratory: No cough, no respiratory distress or labored breathing  Musculoskeletal: Grossly normal with no gross deficits  Skin: No discoloration or apparent rashes  Neurologic: No tremors  Psychiatric: Alert and oriented  Further examination is deferred due to the nature of our visit.

## 2023-04-14 NOTE — LETTER
4/14/2023       RE: Mahesh Porter  [address]  [facility] [address]     Dear Colleague,    Thank you for referring your patient, Mahesh Porter, to the [facility] UROLOGY CLINIC [place] at [facility]. Please see a copy of my visit note below.    Virtual Visit Details    Type of service:  Video Visit   Video Start Time: 10:02 AM  Video End Time: 10:20 AM    Originating Location (pt. Location): Home  Distant Location (provider location):  Off-site  Platform used for Video Visit: Anais       Mahesh Porter complains of   Chief Complaint   Patient presents with    Video Visit     Annual follow up.       Assessment/Plan:  62 year old male with a history of testicular cancer, on testosterone therapy chronically due to resultant hypogonadism, as well as BPH with weak stream who has been experiencing improved symptoms with an OTC supplement. Also continues to take tadalafil 5 mg daily, which helps with his stream and ED. PSA has been stable.   -Continue tadalafil 5 mg daily. Refills sent to pharmacy.   -He will see me in one year for an in-person clinic visit to evaluate his stream and post-void residual. He tried to do this a few years ago but accidentally voided upon arriving to the building so will come with a comfortably-full bladder. Will also update TAYLOR at that time. He will follow up with me sooner in the meantime if any issues/concerns.     Gabby Sow, CNP  Department of Urology      Subjective:   62 year old male with a history of testicular cancer, on testosterone therapy chronically due to resultant hypogonadism, as well as BPH with weak stream, who presents for virtual follow up regarding obstructive LUTS (hesitancy, slow stream). Our last visit was in 10/2021, at which time he felt his symptoms were stable without medication. Has been prescribed Flomax and finasteride in the past. His last PSA was checked a little over a year ago and was  2.20. He states that he had one drawn through his primary care clinic, with records of this not available for personal review today. Updated order placed and pending per his endocrinologist, Dr. Tirado.     Today, he states that he has been taking an herbal supplement lately containing ryegrass and feels this has been helping with his urine flow. There are still times when it is harder to start urinating and his stream is quite slow. Has been better with this supplement. Getting up once per night, which he has been doing for many years. He plans to complete the pending labs ordered by Dr. Tirado within the next few weeks.     Continues to take tadalafil 5 mg daily for ED. This also seems to help with his stream. In need of refills.       Objective:     Exam:   Patient is a 62 year old male   No vital signs obtained due to virtual visit.  General Appearance: Well groomed, hygenic  Respiratory: No cough, no respiratory distress or labored breathing  Musculoskeletal: Grossly normal with no gross deficits  Skin: No discoloration or apparent rashes  Neurologic: No tremors  Psychiatric: Alert and oriented  Further examination is deferred due to the nature of our visit.

## 2023-04-14 NOTE — PATIENT INSTRUCTIONS
UROLOGY CLINIC VISIT PATIENT INSTRUCTIONS    -Continue the tadalafil 5 mg daily. Refills sent to [facility]   -Follow up with me in 1 year for an in-person clinic visit to complete urologic exam, or sooner if needed.     If you have any issues, questions or concerns in the meantime, do not hesitate to contact us at [phone] or via "[employer]     Gabyb Sow, CNP  Department of Urology

## 2023-05-27 ENCOUNTER — LAB (OUTPATIENT)
Dept: LAB | Facility: CLINIC | Age: 63
End: 2023-05-27
Payer: COMMERCIAL

## 2023-05-27 DIAGNOSIS — E29.1 HYPOGONADISM MALE: ICD-10-CM

## 2023-05-27 DIAGNOSIS — R73.03 PREDIABETES: ICD-10-CM

## 2023-05-27 LAB
ERYTHROCYTE [DISTWIDTH] IN BLOOD BY AUTOMATED COUNT: 13 % (ref 10–15)
HBA1C MFR BLD: 6.1 % (ref 0–5.6)
HCT VFR BLD AUTO: 45.9 % (ref 40–53)
HGB BLD-MCNC: 14.7 G/DL (ref 13.3–17.7)
MCH RBC QN AUTO: 30.2 PG (ref 26.5–33)
MCHC RBC AUTO-ENTMCNC: 32 G/DL (ref 31.5–36.5)
MCV RBC AUTO: 94 FL (ref 78–100)
PLATELET # BLD AUTO: 229 10E3/UL (ref 150–450)
PSA SERPL DL<=0.01 NG/ML-MCNC: 2.57 NG/ML (ref 0–4.5)
RBC # BLD AUTO: 4.87 10E6/UL (ref 4.4–5.9)
WBC # BLD AUTO: 5.1 10E3/UL (ref 4–11)

## 2023-05-27 PROCEDURE — 84153 ASSAY OF PSA TOTAL: CPT

## 2023-05-27 PROCEDURE — 85027 COMPLETE CBC AUTOMATED: CPT

## 2023-05-27 PROCEDURE — 83036 HEMOGLOBIN GLYCOSYLATED A1C: CPT

## 2023-05-27 PROCEDURE — 84403 ASSAY OF TOTAL TESTOSTERONE: CPT

## 2023-05-27 PROCEDURE — 36415 COLL VENOUS BLD VENIPUNCTURE: CPT

## 2023-05-30 ENCOUNTER — MYC MEDICAL ADVICE (OUTPATIENT)
Dept: ENDOCRINOLOGY | Facility: CLINIC | Age: 63
End: 2023-05-30
Payer: COMMERCIAL

## 2023-05-30 NOTE — RESULT ENCOUNTER NOTE
Hello -    Here are my comments about your recent results:  Hemoglobin A1c indicates prediabetes.  Given persistent results in this range I recommend medication called metformin for the treatment of this condition.  We can schedule an earlier follow-up appointment to further discuss this.  Hemoglobin is stable.  Testosterone level is still pending and we will contact you once we have the results.  Please let us know if you have any questions or concerns.     Regards,  Jett Tirado MD

## 2023-05-31 DIAGNOSIS — E29.1 HYPOGONADISM MALE: ICD-10-CM

## 2023-05-31 LAB — TESTOST SERPL-MCNC: 266 NG/DL (ref 240–950)

## 2023-05-31 RX ORDER — TESTOSTERONE 1.62 MG/G
2 GEL TRANSDERMAL DAILY
Qty: 75 G | Refills: 0 | Status: SHIPPED | OUTPATIENT
Start: 2023-05-31 | End: 2023-07-11

## 2023-06-02 ENCOUNTER — HEALTH MAINTENANCE LETTER (OUTPATIENT)
Age: 63
End: 2023-06-02

## 2023-10-16 ENCOUNTER — TELEPHONE (OUTPATIENT)
Dept: UROLOGY | Facility: CLINIC | Age: 63
End: 2023-10-16
Payer: COMMERCIAL

## 2023-10-16 NOTE — TELEPHONE ENCOUNTER
----- Message from Damian Amaya sent at 4/14/2023 11:30 AM CDT -----   Return in about 1 year (around 4/14/2024) for Follow up, in person

## 2024-01-17 ENCOUNTER — OFFICE VISIT (OUTPATIENT)
Dept: ENDOCRINOLOGY | Facility: CLINIC | Age: 64
End: 2024-01-17
Payer: COMMERCIAL

## 2024-01-17 VITALS
BODY MASS INDEX: 28.89 KG/M2 | HEART RATE: 83 BPM | WEIGHT: 213 LBS | DIASTOLIC BLOOD PRESSURE: 85 MMHG | OXYGEN SATURATION: 95 % | SYSTOLIC BLOOD PRESSURE: 132 MMHG

## 2024-01-17 DIAGNOSIS — E29.1 HYPOGONADISM MALE: ICD-10-CM

## 2024-01-17 DIAGNOSIS — R73.03 PREDIABETES: ICD-10-CM

## 2024-01-17 DIAGNOSIS — E78.5 HYPERLIPIDEMIA LDL GOAL <100: ICD-10-CM

## 2024-01-17 DIAGNOSIS — G25.81 RESTLESS LEGS SYNDROME (RLS): ICD-10-CM

## 2024-01-17 DIAGNOSIS — E29.1 PRIMARY HYPOGONADISM IN MALE: Primary | ICD-10-CM

## 2024-01-17 PROCEDURE — 99215 OFFICE O/P EST HI 40 MIN: CPT | Performed by: INTERNAL MEDICINE

## 2024-01-17 RX ORDER — METFORMIN HCL 500 MG
1 TABLET, EXTENDED RELEASE 24 HR ORAL DAILY
COMMUNITY
Start: 2023-02-22

## 2024-01-17 RX ORDER — TESTOSTERONE 1.62 MG/G
2 GEL TRANSDERMAL DAILY
Qty: 75 G | Refills: 5 | Status: SHIPPED | OUTPATIENT
Start: 2024-01-17

## 2024-01-17 RX ORDER — ROSUVASTATIN CALCIUM 40 MG/1
40 TABLET, COATED ORAL DAILY
COMMUNITY
Start: 2023-12-27

## 2024-01-17 RX ORDER — LISINOPRIL 20 MG/1
20 TABLET ORAL DAILY
COMMUNITY
Start: 2023-02-17

## 2024-01-17 ASSESSMENT — PAIN SCALES - GENERAL: PAINLEVEL: NO PAIN (0)

## 2024-01-17 NOTE — LETTER
1/17/2024       RE: Mahesh Porter  [address]  [facility] [address]     Dear Colleague,    Thank you for referring your patient, Mahesh Porter, to the [facility] ENDOCRINOLOGY CLINIC [place] at [facility]. Please see a copy of my visit note below.    1/9/2024  PATIENT LAB/IMAGING STATUS : No pending lab orders       Endocrinology clinic Visit    Chief Complaint: Follow Up     Information obtained from:Patient      Assessment/Treatment Plan:      Primary hypogonadism secondary to history of orchiectomy -currently on testosterone gel.  Has been using AndroGel 1.62% pump 2 pumps onto the skin daily. He has using gel daily - he is compliant with medication administration.  Testosterone within the target range.  Symptoms stable.   Complications monitoring from testosterone replacement standpoint  History of mild obstructive sleep apnea-clinically stable  Blood pressure controlled  Currently on a statin treatment  Liver function test results stable  PSA stable  Hematocrit stable.    Prediabetes: Currently on metformin and check follow-up A1c. Has some microalbuminuria and currently takes lisinopril for blood pressure control.    Hyperlipidemia-checking a follow-up lipid panel today.    Restless leg syndrome -check thyroid panel.  Follow-up with neurology.     Test and/or medications prescribed today:  Orders Placed This Encounter   Procedures    Hemoglobin A1c    Testosterone total    Comprehensive metabolic panel    CBC with platelets    PSA tumor marker    Lipid panel reflex to direct LDL Fasting    TSH with free T4 reflex    IRON    IRON AND IRON BINDING CAPACITY    FERRITIN         Jett Tirado MD  Staff Endocrinologist    Division of Endocrinology and Diabetes      Subjective:         HPI: Mahesh Porter is a 63 year old male with history of hypogonadism who is here for a follow up.   History of testicular cancer status post unilateral  orchiectomy in 2006.  Developed hypogonadism following orchiectomy and has been on replacement therapy with testosterone over the last 15+ years.  Currently using testosterone gel-AndroGel 1.62% 2 pumps per day.  Last testosterone level was  normal on the current dose.   Hematocrit was normal.  PSA within normal limits.  Blood pressure well controlled.  Has history of mild obstructive sleep apnea.   He is currently following with neurologist for a sleep disorder.    History of hyperlipidemia currently on statin. [place] [facility] [place]. Requesting a follow-up lipid panel check.    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 Follows with neurology.  Restless leg syndrome      Allergies   Allergen Reactions    Paroxetine Other (See Comments)     And ED    Penicillins Rash       Current Outpatient Medications   Medication Sig Dispense Refill    buPROPion (WELLBUTRIN XL) 300 MG 24 hr tablet Take 1 tablet (300 mg) by mouth every morning TAKE 1 TABLET(150 MG) BY MOUTH EVERY MORNING 90 tablet 1    DULoxetine (CYMBALTA) 60 MG capsule TK 1 C PO QD      lisinopril (ZESTRIL) 20 MG tablet Take 20 mg by mouth daily      Melatonin 10 MG TBCR Take 1 tablet by mouth At Bedtime      metFORMIN (GLUCOPHAGE XR) 500 MG 24 hr tablet Take 1 tablet by mouth daily      Probiotic Product (PROBIOTIC & ACIDOPHILUS EX ST) CAPS Take 1 capsule by mouth      propranolol (INDERAL) 20 MG tablet Take 40 mg by mouth 2 times daily as needed       rosuvastatin (CRESTOR) 20 MG tablet Take 0.5 tablets (10 mg) by mouth daily 45 tablet 1    rosuvastatin (CRESTOR) 40 MG tablet Take 40 mg by mouth daily      tadalafil (CIALIS) 5 MG tablet Take 1 tablet (5 mg) by mouth every  24 hours 90 tablet 3    testosterone (ANDROGEL 1.62 % PUMP) 20.25 MG/ACT gel Place 2 Pump (40.5 mg) onto the skin daily Apply from dispenser to clean, dry, intact skin of the upper arms and shoulders. 75 g 5    Vitamin D3 (CHOLECALCIFEROL) 25 mcg (1000 units) tablet Take by mouth daily         Review of Systems     as per HPI above    Objective:     Wt Readings from Last 10 Encounters:   01/17/24 96.6 kg (213 lb)   01/12/21 98.7 kg (217 lb 11.2 oz)   11/30/20 94.3 kg (208 lb)   08/03/20 94.3 kg (208 lb)   03/09/20 95.7 kg (211 lb)   01/13/20 98 kg (216 lb)   06/17/19 98 kg (216 lb)   06/12/19 97.5 kg (215 lb)   04/30/19 95.3 kg (210 lb)   04/15/19 94.8 kg (209 lb)   /85   Pulse 83   Wt 96.6 kg (213 lb)   SpO2 95%   BMI 28.89 kg/m    Constitutional: Pleasant no acute cardiopulmonary distress.   EYES: anicteric, normal extra-ocular movements, no lid lag or retraction, is equal and reactive to light bilaterally.  HEENT: Mouth/Throat: Mucous membrane is moist. Oropharynx is clear.    Cardiovascular: RRR, S1, S2 normal.   Pulmonary/Chest: CTAB. No wheezing or rales.   Abdominal: +BS. Non tender to palpation.  Stretch marks:    Neurological: Alert and oriented.  No tremor and reflexes are symmetrical bilaterally and within the normal limits. Muscle strength 5/5.   Extremities: No edema.  Genital: prosthetic in the right scrotum area; atrophic left testis. Normal phallus   Psychological: appropriate mood and affect       In House Labs:       TSH   Date Value Ref Range Status   08/03/2020 1.68 0.40 - 4.00 mU/L Final   07/06/2018 0.93 0.40 - 4.00 mU/L Final       Creatinine   Date Value Ref Range Status   12/06/2021 1.16 0.66 - 1.25 mg/dL Final   08/03/2020 1.49 (H) 0.66 - 1.25 mg/dL Final     LDL Cholesterol Calculated   Date Value Ref Range Status   08/26/2022 99 <=100 mg/dL Final   01/12/2021 135 (H) <100 mg/dL Final     Comment:     Above desirable:  100-129 mg/dl  Borderline High:  130-159 mg/dL  High:              160-189 mg/dL  Very high:       >189 mg/dl       Hematocrit   Date Value Ref Range Status   05/27/2023 45.9 40.0 - 53.0 % Final   01/12/2021 50.0 40.0 - 53.0 % Final       PSA   Date Value Ref Range Status   01/12/2021 1.95 0 - 4 ug/L Final     Comment:     Assay Method:  Chemiluminescence using Siemens Vista analyzer     PSA Tumor Marker   Date Value Ref Range Status   05/27/2023 2.57 0.00 - 4.50 ng/mL Final   12/06/2021 2.20 0.00 - 4.00 ug/L Final     Component      Latest Ref Rng 5/27/2023  9:24 AM   WBC      4.0 - 11.0 10e3/uL 5.1    RBC Count      4.40 - 5.90 10e6/uL 4.87    Hemoglobin      13.3 - 17.7 g/dL 14.7    Hematocrit      40.0 - 53.0 % 45.9    MCV      78 - 100 fL 94    MCH      26.5 - 33.0 pg 30.2    MCHC      31.5 - 36.5 g/dL 32.0    RDW      10.0 - 15.0 % 13.0    Platelet Count      150 - 450 10e3/uL 229    PSA Tumor Marker      0.00 - 4.50 ng/mL 2.57    Hemoglobin A1C      0.0 - 5.6 % 6.1 (H)    Testosterone Total      240 - 950 ng/dL 266      40 minutes spent by me on the date of the encounter doing chart review, history and exam, documentation, counseling on management of hypogonadism management and risk of testosterone replacement therapy and further activities per the note.

## 2024-01-17 NOTE — PROGRESS NOTES
Endocrinology clinic Visit    Chief Complaint: Follow Up     Information obtained from:Patient      Assessment/Treatment Plan:      Primary hypogonadism secondary to history of orchiectomy -currently on testosterone gel.  Has been using AndroGel 1.62% pump 2 pumps onto the skin daily. He has using gel daily - he is compliant with medication administration.  Testosterone within the target range.  Symptoms stable.   Complications monitoring from testosterone replacement standpoint  History of mild obstructive sleep apnea-clinically stable  Blood pressure controlled  Currently on a statin treatment  Liver function test results stable  PSA stable  Hematocrit stable.    Prediabetes: Currently on metformin and check follow-up A1c. Has some microalbuminuria and currently takes lisinopril for blood pressure control.    Hyperlipidemia-checking a follow-up lipid panel today.    Restless leg syndrome -check thyroid panel.  Follow-up with neurology.     Test and/or medications prescribed today:  Orders Placed This Encounter   Procedures    Hemoglobin A1c    Testosterone total    Comprehensive metabolic panel    CBC with platelets    PSA tumor marker    Lipid panel reflex to direct LDL Fasting    TSH with free T4 reflex    IRON    IRON AND IRON BINDING CAPACITY    FERRITIN         Jett Tirado MD  Staff Endocrinologist    Division of Endocrinology and Diabetes      Subjective:         HPI: Mahesh Porter is a 63 year old male with history of hypogonadism who is here for a follow up.   History of testicular cancer status post unilateral orchiectomy in 2006.  Developed hypogonadism following orchiectomy and has been on replacement therapy with testosterone over the last 15+ years.  Currently using testosterone gel-AndroGel 1.62% 2 pumps per day.  Last testosterone level was  normal on the current dose.   Hematocrit was normal.  PSA within normal limits.  Blood pressure well controlled.  Has history of mild obstructive sleep  apnea.   He is currently following with neurologist for a sleep disorder.    History of hyperlipidemia currently on statin. Follows with [facility]. Requesting a follow-up lipid panel check.    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 Follows with neurology.  Restless leg syndrome      Allergies   Allergen Reactions    Paroxetine Other (See Comments)     And ED    Penicillins Rash       Current Outpatient Medications   Medication Sig Dispense Refill    buPROPion (WELLBUTRIN XL) 300 MG 24 hr tablet Take 1 tablet (300 mg) by mouth every morning TAKE 1 TABLET(150 MG) BY MOUTH EVERY MORNING 90 tablet 1    DULoxetine (CYMBALTA) 60 MG capsule TK 1 C PO QD      lisinopril (ZESTRIL) 20 MG tablet Take 20 mg by mouth daily      Melatonin 10 MG TBCR Take 1 tablet by mouth At Bedtime      metFORMIN (GLUCOPHAGE XR) 500 MG 24 hr tablet Take 1 tablet by mouth daily      Probiotic Product (PROBIOTIC & ACIDOPHILUS EX ST) CAPS Take 1 capsule by mouth      propranolol (INDERAL) 20 MG tablet Take 40 mg by mouth 2 times daily as needed       rosuvastatin (CRESTOR) 20 MG tablet Take 0.5 tablets (10 mg) by mouth daily 45 tablet 1    rosuvastatin (CRESTOR) 40 MG tablet Take 40 mg by mouth daily      tadalafil (CIALIS) 5 MG tablet Take 1 tablet (5 mg) by mouth every 24 hours 90 tablet 3    testosterone (ANDROGEL 1.62 % PUMP) 20.25 MG/ACT gel Place 2 Pump (40.5 mg) onto the skin daily Apply from dispenser to clean, dry, intact skin of the upper arms and shoulders. 75 g 5    Vitamin D3 (CHOLECALCIFEROL) 25 mcg (1000 units) tablet Take by mouth daily         Review of Systems     as per HPI above    Objective:     Wt Readings from Last 10 Encounters:    01/17/24 96.6 kg (213 lb)   01/12/21 98.7 kg (217 lb 11.2 oz)   11/30/20 94.3 kg (208 lb)   08/03/20 94.3 kg (208 lb)   03/09/20 95.7 kg (211 lb)   01/13/20 98 kg (216 lb)   06/17/19 98 kg (216 lb)   06/12/19 97.5 kg (215 lb)   04/30/19 95.3 kg (210 lb)   04/15/19 94.8 kg (209 lb)   /85   Pulse 83   Wt 96.6 kg (213 lb)   SpO2 95%   BMI 28.89 kg/m    Constitutional: Pleasant no acute cardiopulmonary distress.   EYES: anicteric, normal extra-ocular movements, no lid lag or retraction, is equal and reactive to light bilaterally.  HEENT: Mouth/Throat: Mucous membrane is moist. Oropharynx is clear.    Cardiovascular: RRR, S1, S2 normal.   Pulmonary/Chest: CTAB. No wheezing or rales.   Abdominal: +BS. Non tender to palpation.  Stretch marks:    Neurological: Alert and oriented.  No tremor and reflexes are symmetrical bilaterally and within the normal limits. Muscle strength 5/5.   Extremities: No edema.  Genital: prosthetic in the right scrotum area; atrophic left testis. Normal phallus   Psychological: appropriate mood and affect       In House Labs:       TSH   Date Value Ref Range Status   08/03/2020 1.68 0.40 - 4.00 mU/L Final   07/06/2018 0.93 0.40 - 4.00 mU/L Final       Creatinine   Date Value Ref Range Status   12/06/2021 1.16 0.66 - 1.25 mg/dL Final   08/03/2020 1.49 (H) 0.66 - 1.25 mg/dL Final     LDL Cholesterol Calculated   Date Value Ref Range Status   08/26/2022 99 <=100 mg/dL Final   01/12/2021 135 (H) <100 mg/dL Final     Comment:     Above desirable:  100-129 mg/dl  Borderline High:  130-159 mg/dL  High:             160-189 mg/dL  Very high:       >189 mg/dl       Hematocrit   Date Value Ref Range Status   05/27/2023 45.9 40.0 - 53.0 % Final   01/12/2021 50.0 40.0 - 53.0 % Final       PSA   Date Value Ref Range Status   01/12/2021 1.95 0 - 4 ug/L Final     Comment:     Assay Method:  Chemiluminescence using Siemens Vista analyzer     PSA Tumor Marker   Date Value Ref Range Status   05/27/2023  2.57 0.00 - 4.50 ng/mL Final   12/06/2021 2.20 0.00 - 4.00 ug/L Final     Component      Latest Ref Rng 5/27/2023  9:24 AM   WBC      4.0 - 11.0 10e3/uL 5.1    RBC Count      4.40 - 5.90 10e6/uL 4.87    Hemoglobin      13.3 - 17.7 g/dL 14.7    Hematocrit      40.0 - 53.0 % 45.9    MCV      78 - 100 fL 94    MCH      26.5 - 33.0 pg 30.2    MCHC      31.5 - 36.5 g/dL 32.0    RDW      10.0 - 15.0 % 13.0    Platelet Count      150 - 450 10e3/uL 229    PSA Tumor Marker      0.00 - 4.50 ng/mL 2.57    Hemoglobin A1C      0.0 - 5.6 % 6.1 (H)    Testosterone Total      240 - 950 ng/dL 266      40 minutes spent by me on the date of the encounter doing chart review, history and exam, documentation, counseling on management of hypogonadism management and risk of testosterone replacement therapy and further activities per the note.

## 2024-01-17 NOTE — PATIENT INSTRUCTIONS
Fasting blood work in the morning.   Orders Placed This Encounter   Procedures    Hemoglobin A1c    Testosterone total    Comprehensive metabolic panel    CBC with platelets    PSA tumor marker    Lipid panel reflex to direct LDL Fasting    TSH with free T4 reflex

## 2024-01-31 ENCOUNTER — LAB (OUTPATIENT)
Dept: LAB | Facility: CLINIC | Age: 64
End: 2024-01-31
Payer: COMMERCIAL

## 2024-01-31 DIAGNOSIS — E29.1 PRIMARY HYPOGONADISM IN MALE: ICD-10-CM

## 2024-01-31 DIAGNOSIS — R73.03 PREDIABETES: ICD-10-CM

## 2024-01-31 DIAGNOSIS — G25.81 RESTLESS LEGS SYNDROME (RLS): ICD-10-CM

## 2024-01-31 DIAGNOSIS — E78.5 HYPERLIPIDEMIA LDL GOAL <100: ICD-10-CM

## 2024-01-31 LAB
ALBUMIN SERPL BCG-MCNC: 4.4 G/DL (ref 3.5–5.2)
ALP SERPL-CCNC: 53 U/L (ref 40–150)
ALT SERPL W P-5'-P-CCNC: 29 U/L (ref 0–70)
ANION GAP SERPL CALCULATED.3IONS-SCNC: 10 MMOL/L (ref 7–15)
AST SERPL W P-5'-P-CCNC: 25 U/L (ref 0–45)
BILIRUB SERPL-MCNC: 0.7 MG/DL
BUN SERPL-MCNC: 17.3 MG/DL (ref 8–23)
CALCIUM SERPL-MCNC: 9.2 MG/DL (ref 8.8–10.2)
CHLORIDE SERPL-SCNC: 98 MMOL/L (ref 98–107)
CHOLEST SERPL-MCNC: 185 MG/DL
CREAT SERPL-MCNC: 1.2 MG/DL (ref 0.67–1.17)
DEPRECATED HCO3 PLAS-SCNC: 27 MMOL/L (ref 22–29)
EGFRCR SERPLBLD CKD-EPI 2021: 68 ML/MIN/1.73M2
ERYTHROCYTE [DISTWIDTH] IN BLOOD BY AUTOMATED COUNT: 13 % (ref 10–15)
FASTING STATUS PATIENT QL REPORTED: YES
FERRITIN SERPL-MCNC: 168 NG/ML (ref 31–409)
GLUCOSE SERPL-MCNC: 122 MG/DL (ref 70–99)
HBA1C MFR BLD: 6.5 % (ref 0–5.6)
HCT VFR BLD AUTO: 47.9 % (ref 40–53)
HDLC SERPL-MCNC: 42 MG/DL
HGB BLD-MCNC: 15.6 G/DL (ref 13.3–17.7)
IRON BINDING CAPACITY (ROCHE): 339 UG/DL (ref 240–430)
IRON SATN MFR SERPL: 30 % (ref 15–46)
IRON SERPL-MCNC: 102 UG/DL (ref 61–157)
LDLC SERPL CALC-MCNC: 83 MG/DL
MCH RBC QN AUTO: 30.6 PG (ref 26.5–33)
MCHC RBC AUTO-ENTMCNC: 32.6 G/DL (ref 31.5–36.5)
MCV RBC AUTO: 94 FL (ref 78–100)
NONHDLC SERPL-MCNC: 143 MG/DL
PLATELET # BLD AUTO: 249 10E3/UL (ref 150–450)
POTASSIUM SERPL-SCNC: 4.2 MMOL/L (ref 3.4–5.3)
PROT SERPL-MCNC: 6.9 G/DL (ref 6.4–8.3)
PSA SERPL DL<=0.01 NG/ML-MCNC: 2.61 NG/ML (ref 0–4.5)
RBC # BLD AUTO: 5.1 10E6/UL (ref 4.4–5.9)
SODIUM SERPL-SCNC: 135 MMOL/L (ref 135–145)
TRIGL SERPL-MCNC: 299 MG/DL
TSH SERPL DL<=0.005 MIU/L-ACNC: 1.75 UIU/ML (ref 0.3–4.2)
WBC # BLD AUTO: 5.7 10E3/UL (ref 4–11)

## 2024-01-31 PROCEDURE — 82728 ASSAY OF FERRITIN: CPT

## 2024-01-31 PROCEDURE — 84403 ASSAY OF TOTAL TESTOSTERONE: CPT

## 2024-01-31 PROCEDURE — 83036 HEMOGLOBIN GLYCOSYLATED A1C: CPT

## 2024-01-31 PROCEDURE — 80053 COMPREHEN METABOLIC PANEL: CPT

## 2024-01-31 PROCEDURE — 84443 ASSAY THYROID STIM HORMONE: CPT

## 2024-01-31 PROCEDURE — 83550 IRON BINDING TEST: CPT

## 2024-01-31 PROCEDURE — 84153 ASSAY OF PSA TOTAL: CPT

## 2024-01-31 PROCEDURE — 80061 LIPID PANEL: CPT

## 2024-01-31 PROCEDURE — 36415 COLL VENOUS BLD VENIPUNCTURE: CPT

## 2024-01-31 PROCEDURE — 83540 ASSAY OF IRON: CPT

## 2024-01-31 PROCEDURE — 85027 COMPLETE CBC AUTOMATED: CPT

## 2024-02-02 LAB — TESTOST SERPL-MCNC: 339 NG/DL (ref 240–950)

## 2024-02-06 NOTE — RESULT ENCOUNTER NOTE
Hello -    Here are my comments about your recent results:  -Normal red blood cell (hgb) levels, normal white blood cell count and normal platelet levels.  -Triglycerides are elevated which can increase your heart disease risk.  A diet high in fat and simple carbohydrates, genetics and being overweight can contribute to this.  Your LDL(bad) cholesterol and HDL(good) cholesterol levels are normal.  ADVISE: exercising 150 minutes of aerobic exercise per week (30 minutes 5 days per week or 50 minutes 3 days per week are options), weight control, and omega-3 fatty acids (fish oil) 2435-7298 mg daily are helpful to improve this.  Please follow-up with your primary care physician regarding this issue and in 6 months, you should recheck your fasting cholesterol panel at the time of your follow-up with your primary as directed  -Liver and gallbladder tests are normal (ALT,AST, Alk phos, bilirubin), kidney function is normal (Cr, GFR), sodium is normal, potassium is normal, calcium is normal, glucose is normal.  -A1C (test of diabetes control the last 2-3 months) is at your goal.  It is higher than what was previously noted.  I recommend increasing your metformin to 2 tablets daily.  Please recheck your A1C test in 3 months.   -TSH (thyroid stimulating hormone) level is normal which indicates normal thyroid function.  -Ferritin (iron) level is normal.  -Iron level is normal.    Testosterone, PSA last 2 show checked as a part of follow-up for testosterone replacement therapy are within the expected range.  For additional lab test information, labtestsonline.org is an excellent reference..    Please let us know if you have any questions or concerns.     Regards,  Jett Tirado MD

## 2024-02-28 ENCOUNTER — PATIENT OUTREACH (OUTPATIENT)
Dept: CARE COORDINATION | Facility: CLINIC | Age: 64
End: 2024-02-28
Payer: COMMERCIAL

## 2024-02-28 NOTE — PROGRESS NOTES
Clinic Care Coordination Contact  Program:   [facility]: [place]   Renewal: [facility]   Date Applied:      [facility] Outreach:   2/28/24: 1st outreach attempt. Left a message on voicemail with call back information and requested return call.  Plan: CTA will call again within 2 weeks.  [facility]   [facility]  Clinic Care Coordination  [phone]        Health Insurance:        Referral/Screening:

## 2024-04-02 ENCOUNTER — PATIENT OUTREACH (OUTPATIENT)
Dept: CARE COORDINATION | Facility: CLINIC | Age: 64
End: 2024-04-02

## 2024-04-02 NOTE — PROGRESS NOTES
Clinic Care Coordination Contact  Program:   [facility]: [place]   Renewal: [facility]   Date Applied:      [facility] Outreach:   4/2/24: CTA called to see if patient needed assistance with their [facility] Renewal. Patient declined needing assistance and no follow up needed   [facility]   [facility] Care Coordination  [phone]    2/28/24: 1st outreach attempt. Left a message on voicemail with call back information and requested return call.  Plan: CTA will call again within 2 weeks.  [facility]   [facility]  Clinic Care Coordination  [phone]        Health Insurance:        Referral/Screening:

## 2024-06-30 ENCOUNTER — HEALTH MAINTENANCE LETTER (OUTPATIENT)
Age: 64
End: 2024-06-30

## 2025-07-13 ENCOUNTER — HEALTH MAINTENANCE LETTER (OUTPATIENT)
Age: 65
End: 2025-07-13

## (undated) RX ORDER — FENTANYL CITRATE 50 UG/ML
INJECTION, SOLUTION INTRAMUSCULAR; INTRAVENOUS
Status: DISPENSED
Start: 2020-11-30